# Patient Record
Sex: MALE | Race: BLACK OR AFRICAN AMERICAN | Employment: UNEMPLOYED
[De-identification: names, ages, dates, MRNs, and addresses within clinical notes are randomized per-mention and may not be internally consistent; named-entity substitution may affect disease eponyms.]

---

## 2017-03-24 PROBLEM — R53.82 CHRONIC FATIGUE: Status: ACTIVE | Noted: 2017-03-24

## 2017-03-24 PROBLEM — R06.09 DYSPNEA ON EXERTION: Status: ACTIVE | Noted: 2017-03-24

## 2017-10-17 ENCOUNTER — TELEPHONE (OUTPATIENT)
Dept: CASE MANAGEMENT | Age: 57
End: 2017-10-17

## 2017-10-25 ENCOUNTER — TELEPHONE (OUTPATIENT)
Dept: CASE MANAGEMENT | Age: 57
End: 2017-10-25

## 2017-10-25 NOTE — LETTER
Medical Imaging Services         10/25/2017      Melchor Alegria  [email or web address]      Dear Melchor Alegria,        Our records indicate you missed your scheduled CT Lung Screening on  10/24/17. We were unsuccessful in contacting you by phone, however we value you as a patient and want to ensure that you are getting the appropriate care. Please call our patient navigator [phone]) or our scheduling department ([phone]) for assistance in rescheduling this important screening exam.           Sincerely,    [facility]  [address]:  [phone]  F:  [phone]          Uli Cabral MD

## 2017-10-25 NOTE — TELEPHONE ENCOUNTER
No show for CT Lung Screening scheduled 10/24/17. Left message for patient to call to reschedule.       Araceli Olvera, R.T.(R)(T), Radiology Patient Navigator

## 2018-03-12 ENCOUNTER — TELEPHONE (OUTPATIENT)
Dept: PULMONOLOGY | Age: 58
End: 2018-03-12

## 2018-03-12 DIAGNOSIS — R06.09 DYSPNEA ON EXERTION: ICD-10-CM

## 2018-03-12 DIAGNOSIS — J44.9 CHRONIC OBSTRUCTIVE PULMONARY DISEASE, UNSPECIFIED COPD TYPE (HCC): ICD-10-CM

## 2018-03-12 RX ORDER — BUDESONIDE AND FORMOTEROL FUMARATE DIHYDRATE 160; 4.5 UG/1; UG/1
2 AEROSOL RESPIRATORY (INHALATION) 2 TIMES DAILY
Qty: 3 INHALER | Refills: 3 | Status: SHIPPED | OUTPATIENT
Start: 2018-03-12 | End: 2018-10-30 | Stop reason: SDUPTHER

## 2018-03-22 RX ORDER — VARENICLINE TARTRATE 1 MG/1
1 TABLET, FILM COATED ORAL 2 TIMES DAILY
Qty: 30 TABLET | Refills: 1 | Status: SHIPPED | OUTPATIENT
Start: 2018-03-22 | End: 2018-04-10

## 2018-04-10 ENCOUNTER — HOSPITAL ENCOUNTER (OUTPATIENT)
Age: 58
Discharge: HOME | End: 2018-04-12
Payer: MEDICAID

## 2018-04-10 ENCOUNTER — OFFICE VISIT (OUTPATIENT)
Dept: FAMILY MEDICINE CLINIC | Age: 58
End: 2018-04-10
Payer: MEDICAID

## 2018-04-10 VITALS
HEIGHT: 73 IN | WEIGHT: 221 LBS | OXYGEN SATURATION: 94 % | HEART RATE: 97 BPM | BODY MASS INDEX: 29.29 KG/M2 | RESPIRATION RATE: 20 BRPM | DIASTOLIC BLOOD PRESSURE: 70 MMHG | SYSTOLIC BLOOD PRESSURE: 138 MMHG

## 2018-04-10 DIAGNOSIS — I10 ESSENTIAL HYPERTENSION: ICD-10-CM

## 2018-04-10 DIAGNOSIS — J44.9 CHRONIC OBSTRUCTIVE PULMONARY DISEASE, UNSPECIFIED COPD TYPE (HCC): Primary | ICD-10-CM

## 2018-04-10 DIAGNOSIS — M79.10 MYALGIA: ICD-10-CM

## 2018-04-10 DIAGNOSIS — R53.82 CHRONIC FATIGUE: ICD-10-CM

## 2018-04-10 PROBLEM — R06.09 DYSPNEA ON EXERTION: Status: RESOLVED | Noted: 2017-03-24 | Resolved: 2018-04-10

## 2018-04-10 LAB
ALBUMIN SERPL-MCNC: 4.2 G/DL (ref 3.5–5.2)
ALP BLD-CCNC: 59 U/L (ref 40–129)
ALT SERPL-CCNC: 13 U/L (ref 0–40)
ANION GAP SERPL CALCULATED.3IONS-SCNC: 12 MMOL/L (ref 7–16)
AST SERPL-CCNC: 13 U/L (ref 0–39)
BASOPHILS ABSOLUTE: 0.01 E9/L (ref 0–0.2)
BASOPHILS RELATIVE PERCENT: 0.1 % (ref 0–2)
BILIRUB SERPL-MCNC: 0.2 MG/DL (ref 0–1.2)
BUN BLDV-MCNC: 13 MG/DL (ref 6–20)
CALCIUM SERPL-MCNC: 9 MG/DL (ref 8.6–10.2)
CHLORIDE BLD-SCNC: 91 MMOL/L (ref 98–107)
CO2: 41 MMOL/L (ref 22–29)
CREAT SERPL-MCNC: 0.8 MG/DL (ref 0.7–1.2)
EOSINOPHILS ABSOLUTE: 0.03 E9/L (ref 0.05–0.5)
EOSINOPHILS RELATIVE PERCENT: 0.3 % (ref 0–6)
FOLATE: 18.2 NG/ML (ref 4.8–24.2)
GFR AFRICAN AMERICAN: >60
GFR NON-AFRICAN AMERICAN: >60 ML/MIN/1.73
GLUCOSE BLD-MCNC: 110 MG/DL (ref 74–109)
HCT VFR BLD CALC: 41.8 % (ref 37–54)
HEMOGLOBIN: 12.4 G/DL (ref 12.5–16.5)
IMMATURE GRANULOCYTES #: 0.04 E9/L
IMMATURE GRANULOCYTES %: 0.4 % (ref 0–5)
LYMPHOCYTES ABSOLUTE: 1.21 E9/L (ref 1.5–4)
LYMPHOCYTES RELATIVE PERCENT: 11.4 % (ref 20–42)
MAGNESIUM: 2.2 MG/DL (ref 1.6–2.6)
MCH RBC QN AUTO: 30.7 PG (ref 26–35)
MCHC RBC AUTO-ENTMCNC: 29.7 % (ref 32–34.5)
MCV RBC AUTO: 103.5 FL (ref 80–99.9)
MONOCYTES ABSOLUTE: 0.81 E9/L (ref 0.1–0.95)
MONOCYTES RELATIVE PERCENT: 7.6 % (ref 2–12)
NEUTROPHILS ABSOLUTE: 8.49 E9/L (ref 1.8–7.3)
NEUTROPHILS RELATIVE PERCENT: 80.2 % (ref 43–80)
PDW BLD-RTO: 11.9 FL (ref 11.5–15)
PLATELET # BLD: 343 E9/L (ref 130–450)
PMV BLD AUTO: 10.5 FL (ref 7–12)
POTASSIUM SERPL-SCNC: 4.3 MMOL/L (ref 3.5–5)
RBC # BLD: 4.04 E12/L (ref 3.8–5.8)
RHEUMATOID FACTOR: <10 IU/ML (ref 0–13)
SODIUM BLD-SCNC: 144 MMOL/L (ref 132–146)
TOTAL CK: 174 U/L (ref 20–200)
TOTAL PROTEIN: 6.8 G/DL (ref 6.4–8.3)
TSH SERPL DL<=0.05 MIU/L-ACNC: 0.46 UIU/ML (ref 0.27–4.2)
VITAMIN B-12: 278 PG/ML (ref 211–946)
WBC # BLD: 10.6 E9/L (ref 4.5–11.5)

## 2018-04-10 PROCEDURE — 86038 ANTINUCLEAR ANTIBODIES: CPT

## 2018-04-10 PROCEDURE — 83735 ASSAY OF MAGNESIUM: CPT

## 2018-04-10 PROCEDURE — 86431 RHEUMATOID FACTOR QUANT: CPT

## 2018-04-10 PROCEDURE — 85025 COMPLETE CBC W/AUTO DIFF WBC: CPT

## 2018-04-10 PROCEDURE — 85651 RBC SED RATE NONAUTOMATED: CPT

## 2018-04-10 PROCEDURE — 36415 COLL VENOUS BLD VENIPUNCTURE: CPT | Performed by: FAMILY MEDICINE

## 2018-04-10 PROCEDURE — 84443 ASSAY THYROID STIM HORMONE: CPT

## 2018-04-10 PROCEDURE — 99214 OFFICE O/P EST MOD 30 MIN: CPT | Performed by: FAMILY MEDICINE

## 2018-04-10 PROCEDURE — 80053 COMPREHEN METABOLIC PANEL: CPT

## 2018-04-10 PROCEDURE — 82746 ASSAY OF FOLIC ACID SERUM: CPT

## 2018-04-10 PROCEDURE — 82607 VITAMIN B-12: CPT

## 2018-04-10 PROCEDURE — 82550 ASSAY OF CK (CPK): CPT

## 2018-04-10 ASSESSMENT — ENCOUNTER SYMPTOMS
NAUSEA: 0
VOMITING: 0
EYE ITCHING: 0
TROUBLE SWALLOWING: 0
DIARRHEA: 0
COLOR CHANGE: 0
ABDOMINAL PAIN: 0
CHEST TIGHTNESS: 0
COUGH: 1
BACK PAIN: 0
EYE REDNESS: 0
SHORTNESS OF BREATH: 1
SINUS PRESSURE: 0
CONSTIPATION: 0
SORE THROAT: 0
EYE PAIN: 0
WHEEZING: 1
RHINORRHEA: 0
BLOOD IN STOOL: 0
APNEA: 0

## 2018-04-10 ASSESSMENT — COPD QUESTIONNAIRES: COPD: 1

## 2018-04-10 ASSESSMENT — PATIENT HEALTH QUESTIONNAIRE - PHQ9
1. LITTLE INTEREST OR PLEASURE IN DOING THINGS: 0
SUM OF ALL RESPONSES TO PHQ9 QUESTIONS 1 & 2: 1
SUM OF ALL RESPONSES TO PHQ QUESTIONS 1-9: 1
2. FEELING DOWN, DEPRESSED OR HOPELESS: 1

## 2018-04-11 LAB
ANTI-NUCLEAR ANTIBODY (ANA): NEGATIVE
SEDIMENTATION RATE, ERYTHROCYTE: 5 MM/HR (ref 0–15)

## 2018-05-03 ENCOUNTER — TELEPHONE (OUTPATIENT)
Dept: PULMONOLOGY | Age: 58
End: 2018-05-03

## 2018-05-03 ENCOUNTER — OFFICE VISIT (OUTPATIENT)
Dept: PULMONOLOGY | Age: 58
End: 2018-05-03
Payer: MEDICAID

## 2018-05-03 VITALS
SYSTOLIC BLOOD PRESSURE: 113 MMHG | OXYGEN SATURATION: 95 % | HEART RATE: 105 BPM | RESPIRATION RATE: 26 BRPM | BODY MASS INDEX: 29.53 KG/M2 | DIASTOLIC BLOOD PRESSURE: 62 MMHG | WEIGHT: 218 LBS | HEIGHT: 72 IN

## 2018-05-03 DIAGNOSIS — J44.9 CHRONIC OBSTRUCTIVE PULMONARY DISEASE, UNSPECIFIED COPD TYPE (HCC): ICD-10-CM

## 2018-05-03 PROCEDURE — 99213 OFFICE O/P EST LOW 20 MIN: CPT | Performed by: INTERNAL MEDICINE

## 2018-05-23 RX ORDER — VARENICLINE TARTRATE 1 MG/1
1 TABLET, FILM COATED ORAL 2 TIMES DAILY
Qty: 60 TABLET | Refills: 3 | Status: SHIPPED | OUTPATIENT
Start: 2018-05-23 | End: 2018-09-27 | Stop reason: SDUPTHER

## 2018-05-23 RX ORDER — ALBUTEROL SULFATE 90 UG/1
2 AEROSOL, METERED RESPIRATORY (INHALATION) EVERY 4 HOURS PRN
Qty: 1 INHALER | Refills: 5 | Status: SHIPPED | OUTPATIENT
Start: 2018-05-23 | End: 2018-10-30 | Stop reason: SDUPTHER

## 2018-05-23 RX ORDER — LISINOPRIL AND HYDROCHLOROTHIAZIDE 25; 20 MG/1; MG/1
1 TABLET ORAL DAILY
Qty: 30 TABLET | Refills: 5 | Status: SHIPPED | OUTPATIENT
Start: 2018-05-23 | End: 2019-01-09 | Stop reason: SDUPTHER

## 2018-07-05 ENCOUNTER — TELEPHONE (OUTPATIENT)
Dept: PULMONOLOGY | Age: 58
End: 2018-07-05

## 2018-07-05 NOTE — TELEPHONE ENCOUNTER
Returned patient's phone call about some questions he had  He stated that on 6-29-18, he was in his car and his portable oxygen concentrator overheated and he felt like he was suffocating, had a panic attack. 911 was called and EMT. Fire and Police showed up. He called B[facility]ELLE and that he was told he would have to bring in his POC to have it serviced and he did not want to drive there for that  I called [facility] and spoke to [facility] and she stated that if the patient wanted to get his POC serviced he would have to come in. They would not go pick it up, repair and drop it back off. She also stated that they do not sell the POC's anymore but said the patient could get new oxygen tanks that he could fill up himself and that would replace the POC and they could do that tomorrow  I called patient back and relayed the message from [facility] and he stated that he would be interested in doing that  I called [facility] back and she set everything up and that they would deliver everything tomorrow and take his POC as well. They could not guarantee a time but would call patient in the morning to let him know what stop he was  I called the patient back once more and relayed this message and told him to answer the phone tomorrow.

## 2018-09-28 RX ORDER — VARENICLINE TARTRATE 1 MG/1
TABLET, FILM COATED ORAL
Qty: 60 TABLET | Refills: 0 | Status: SHIPPED | OUTPATIENT
Start: 2018-09-28 | End: 2019-07-15 | Stop reason: SDUPTHER

## 2018-10-30 ENCOUNTER — TELEPHONE (OUTPATIENT)
Dept: PULMONOLOGY | Age: 58
End: 2018-10-30

## 2018-10-30 DIAGNOSIS — R06.09 DYSPNEA ON EXERTION: ICD-10-CM

## 2018-10-30 DIAGNOSIS — J44.9 CHRONIC OBSTRUCTIVE PULMONARY DISEASE, UNSPECIFIED COPD TYPE (HCC): Primary | ICD-10-CM

## 2018-10-30 RX ORDER — BUDESONIDE AND FORMOTEROL FUMARATE DIHYDRATE 160; 4.5 UG/1; UG/1
2 AEROSOL RESPIRATORY (INHALATION) 2 TIMES DAILY
Qty: 3 INHALER | Refills: 5 | Status: SHIPPED | OUTPATIENT
Start: 2018-10-30 | End: 2018-10-30

## 2018-10-30 RX ORDER — BUDESONIDE AND FORMOTEROL FUMARATE DIHYDRATE 160; 4.5 UG/1; UG/1
2 AEROSOL RESPIRATORY (INHALATION) 2 TIMES DAILY
Qty: 1 INHALER | Refills: 3 | Status: SHIPPED | OUTPATIENT
Start: 2018-10-30 | End: 2019-01-11 | Stop reason: SDUPTHER

## 2018-10-30 RX ORDER — ALBUTEROL SULFATE 90 UG/1
2 AEROSOL, METERED RESPIRATORY (INHALATION) EVERY 4 HOURS PRN
Qty: 1 INHALER | Refills: 5 | Status: SHIPPED | OUTPATIENT
Start: 2018-10-30 | End: 2019-05-06 | Stop reason: SDUPTHER

## 2018-10-30 NOTE — TELEPHONE ENCOUNTER
Refills needed for inhalers; advised pt new scripts will be sent to pharmacy for pt to . Sample of Symbicort available at office here per Dr. Sergo Jacobo's orders for pt to .

## 2019-01-09 RX ORDER — LISINOPRIL AND HYDROCHLOROTHIAZIDE 25; 20 MG/1; MG/1
1 TABLET ORAL DAILY
Qty: 30 TABLET | Refills: 0 | Status: SHIPPED | OUTPATIENT
Start: 2019-01-09 | End: 2019-02-11 | Stop reason: SDUPTHER

## 2019-01-11 ENCOUNTER — OFFICE VISIT (OUTPATIENT)
Dept: PULMONOLOGY | Age: 59
End: 2019-01-11
Payer: MEDICAID

## 2019-01-11 VITALS
HEIGHT: 73 IN | TEMPERATURE: 99.2 F | HEART RATE: 87 BPM | DIASTOLIC BLOOD PRESSURE: 63 MMHG | SYSTOLIC BLOOD PRESSURE: 122 MMHG | RESPIRATION RATE: 12 BRPM | OXYGEN SATURATION: 95 % | WEIGHT: 222 LBS | BODY MASS INDEX: 29.42 KG/M2

## 2019-01-11 DIAGNOSIS — J44.9 CHRONIC OBSTRUCTIVE PULMONARY DISEASE, UNSPECIFIED COPD TYPE (HCC): Primary | ICD-10-CM

## 2019-01-11 PROCEDURE — 99213 OFFICE O/P EST LOW 20 MIN: CPT | Performed by: INTERNAL MEDICINE

## 2019-01-11 PROCEDURE — 99214 OFFICE O/P EST MOD 30 MIN: CPT | Performed by: INTERNAL MEDICINE

## 2019-01-11 RX ORDER — BUDESONIDE AND FORMOTEROL FUMARATE DIHYDRATE 160; 4.5 UG/1; UG/1
2 AEROSOL RESPIRATORY (INHALATION) 2 TIMES DAILY
Qty: 1 INHALER | Refills: 5 | Status: SHIPPED | OUTPATIENT
Start: 2019-01-11 | End: 2020-01-24 | Stop reason: SDUPTHER

## 2019-02-11 RX ORDER — LISINOPRIL AND HYDROCHLOROTHIAZIDE 25; 20 MG/1; MG/1
1 TABLET ORAL DAILY
Qty: 30 TABLET | Refills: 5 | Status: SHIPPED | OUTPATIENT
Start: 2019-02-11 | End: 2019-12-04 | Stop reason: SDUPTHER

## 2019-04-05 ENCOUNTER — TELEPHONE (OUTPATIENT)
Dept: PULMONOLOGY | Age: 59
End: 2019-04-05

## 2019-04-08 ENCOUNTER — TELEPHONE (OUTPATIENT)
Dept: PULMONOLOGY | Age: 59
End: 2019-04-08

## 2019-04-08 NOTE — TELEPHONE ENCOUNTER
ELIDA called patient per RN from pulmonary due to patient reporting stress interfering with medical compliance at times. 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 He also has a 15and 5year old son. Patient reports that he has limited support from family/friends and has been trying for a year to obtain assistance for his daughter but struggles to keep appointments due to his health issues. Patient reports he has been in contact with the [employer] as well as [employer] and [employer]. He reports never completing the waiver application as he also has to go  her official birth certificate from the [employer]. He reports it is very difficult to get her out of the car and has yet to find someone who is able to watch her. He does report he just recently began working with the  through [facility] who is facilitating community linkages. Otherwise, he seems to be aware of services within the community but has difficulty keeping appointments due to his own health issues. SW encouraged making a list of the steps of most important and making a goal to complete one each week without setting a specific day since it is based on how he feels. He did report having a friend over today to help him. Discussed the importance of getting main things done first in order to even consider tackling the bigger projects. He agreed. Patient also questioned about samples for symbicort as he had been using them too often so is out until his next refill. ELIDA spoke with RN, Shy Barth who states that she told patient that we are currently out of samples but will notify him when some come in for him to come . Relayed this to patient.  ELIDA offered empathetic support, encouraged problem solving/goal setting, and offered resource options but he was already aware/linked with. Encouraged him to reach out to SW with further questions/concerns. Will check in with him at next appointment. Will continue to follow as needed.

## 2019-05-06 ENCOUNTER — TELEPHONE (OUTPATIENT)
Dept: PULMONOLOGY | Age: 59
End: 2019-05-06

## 2019-05-06 DIAGNOSIS — J44.9 CHRONIC OBSTRUCTIVE PULMONARY DISEASE, UNSPECIFIED COPD TYPE (HCC): ICD-10-CM

## 2019-05-06 RX ORDER — ALBUTEROL SULFATE 90 UG/1
2 AEROSOL, METERED RESPIRATORY (INHALATION) EVERY 4 HOURS PRN
Qty: 1 INHALER | Refills: 5 | Status: SHIPPED | OUTPATIENT
Start: 2019-05-06 | End: 2019-09-03 | Stop reason: SDUPTHER

## 2019-05-07 ENCOUNTER — TELEPHONE (OUTPATIENT)
Dept: PULMONOLOGY | Age: 59
End: 2019-05-07

## 2019-05-07 NOTE — TELEPHONE ENCOUNTER
Call from pt requesting RN switch his oxygen out to [address] [place] [address] so he can get the portable Oxygen Concentrator he wants back. Advised pt that I will need to clarify O2 orders to ensure he can get adequate oxygenation with the O2@   4 liters. RN reviewed records and O2 sat with 4 Liters O2 on @95%. Per Colonel Jeronimo @ [facility] they\"no longer provide Portable O2 Concentrators for pt's at home; they only have portable cylinders\". Spoke with Tessa Ramírez and she will check with Respiratory dept there to see if they can provide portable concentrator for pt. Rn advised pt we are working on his request and will update him as soon as we have info to share.

## 2019-05-08 ENCOUNTER — TELEPHONE (OUTPATIENT)
Dept: PULMONOLOGY | Age: 59
End: 2019-05-08

## 2019-05-08 NOTE — TELEPHONE ENCOUNTER
Called [facility] and spoke to [place] [facility]. Mimi Zamora stated that she faxed everything over yesterday. I explained that [facility] did not receive it and asked if she could refax it again.  She stated that she would

## 2019-05-10 ENCOUNTER — TELEPHONE (OUTPATIENT)
Dept: PULMONOLOGY | Age: 59
End: 2019-05-10

## 2019-05-10 NOTE — TELEPHONE ENCOUNTER
Call to pt to update on attempts to get Oxygen switched to [facility] from [address] [place] [address]. Some info is not available for pt in medical record and [employer] requires new oxygen testing and orders. Info that [facility] received from [facility] is not able to be used. Advised pt to continue the portable O2 he has in home from [facility] until we are able to switch out after appt in July. Pt will require new oxygen testing and needs to be evaluated with Oxygen in office. Pt agrees to this plan. RN will continue to try and obtain oxygen from [facility].

## 2019-05-23 ENCOUNTER — TELEPHONE (OUTPATIENT)
Dept: PULMONOLOGY | Age: 59
End: 2019-05-23

## 2019-05-23 NOTE — TELEPHONE ENCOUNTER
Mailed a letter to patient informing him that his CT Chest is scheduled for 6-20-19 at 10:30 am at the [facility]. He must arrive by 10:00 am. There is no prep for this test

## 2019-07-15 ENCOUNTER — TELEPHONE (OUTPATIENT)
Dept: PULMONOLOGY | Age: 59
End: 2019-07-15

## 2019-07-15 DIAGNOSIS — J44.9 CHRONIC OBSTRUCTIVE PULMONARY DISEASE, UNSPECIFIED COPD TYPE (HCC): Primary | ICD-10-CM

## 2019-07-15 DIAGNOSIS — Z72.0 TOBACCO ABUSE: ICD-10-CM

## 2019-07-15 RX ORDER — VARENICLINE TARTRATE 1 MG/1
TABLET, FILM COATED ORAL
Qty: 60 TABLET | Refills: 0 | Status: SHIPPED | OUTPATIENT
Start: 2019-07-15 | End: 2019-08-07 | Stop reason: SDUPTHER

## 2019-08-07 DIAGNOSIS — J44.9 CHRONIC OBSTRUCTIVE PULMONARY DISEASE, UNSPECIFIED COPD TYPE (HCC): ICD-10-CM

## 2019-08-07 DIAGNOSIS — Z72.0 TOBACCO ABUSE: ICD-10-CM

## 2019-08-07 RX ORDER — VARENICLINE TARTRATE 1 MG/1
TABLET, FILM COATED ORAL
Qty: 60 TABLET | Refills: 1 | Status: SHIPPED
Start: 2019-08-07 | End: 2020-04-07

## 2019-09-03 ENCOUNTER — TELEPHONE (OUTPATIENT)
Dept: PULMONOLOGY | Age: 59
End: 2019-09-03

## 2019-09-03 DIAGNOSIS — J44.9 CHRONIC OBSTRUCTIVE PULMONARY DISEASE, UNSPECIFIED COPD TYPE (HCC): ICD-10-CM

## 2019-09-03 DIAGNOSIS — G47.33 OSA (OBSTRUCTIVE SLEEP APNEA): Primary | ICD-10-CM

## 2019-10-01 DIAGNOSIS — J44.9 CHRONIC OBSTRUCTIVE PULMONARY DISEASE, UNSPECIFIED COPD TYPE (HCC): ICD-10-CM

## 2019-10-24 DIAGNOSIS — J44.9 CHRONIC OBSTRUCTIVE PULMONARY DISEASE, UNSPECIFIED COPD TYPE (HCC): ICD-10-CM

## 2019-10-25 ENCOUNTER — TELEPHONE (OUTPATIENT)
Dept: PULMONOLOGY | Age: 59
End: 2019-10-25

## 2019-10-25 DIAGNOSIS — J44.9 CHRONIC OBSTRUCTIVE PULMONARY DISEASE, UNSPECIFIED COPD TYPE (HCC): ICD-10-CM

## 2019-10-25 RX ORDER — ALBUTEROL SULFATE 90 UG/1
AEROSOL, METERED RESPIRATORY (INHALATION)
Qty: 18 G | Refills: 5 | Status: SHIPPED | OUTPATIENT
Start: 2019-10-25 | End: 2020-01-24

## 2019-11-15 DIAGNOSIS — J44.9 CHRONIC OBSTRUCTIVE PULMONARY DISEASE, UNSPECIFIED COPD TYPE (HCC): ICD-10-CM

## 2019-11-15 RX ORDER — ALBUTEROL SULFATE 90 UG/1
AEROSOL, METERED RESPIRATORY (INHALATION)
Qty: 18 G | Refills: 6 | Status: SHIPPED | OUTPATIENT
Start: 2019-11-15 | End: 2020-10-02 | Stop reason: SDUPTHER

## 2019-11-22 ENCOUNTER — TELEPHONE (OUTPATIENT)
Dept: PULMONOLOGY | Age: 59
End: 2019-11-22

## 2019-12-04 RX ORDER — LISINOPRIL AND HYDROCHLOROTHIAZIDE 25; 20 MG/1; MG/1
1 TABLET ORAL DAILY
Qty: 30 TABLET | Refills: 5 | Status: SHIPPED | OUTPATIENT
Start: 2019-12-04 | End: 2020-07-06

## 2019-12-16 DIAGNOSIS — J44.9 CHRONIC OBSTRUCTIVE PULMONARY DISEASE, UNSPECIFIED COPD TYPE (HCC): Primary | ICD-10-CM

## 2019-12-16 RX ORDER — TIOTROPIUM BROMIDE 18 UG/1
CAPSULE ORAL; RESPIRATORY (INHALATION)
Refills: 3 | COMMUNITY
Start: 2019-09-23 | End: 2019-12-16 | Stop reason: SDUPTHER

## 2020-01-08 ENCOUNTER — TELEPHONE (OUTPATIENT)
Dept: PULMONOLOGY | Age: 60
End: 2020-01-08

## 2020-01-08 NOTE — TELEPHONE ENCOUNTER
Call from pt and left  requesting RN call pt. Returned call and pt reports having some feet/leg  \"swelling\" at times. Pt states he takes his lisinopril daily per orders but recently he's had some swelling in his feet and legs and was wondering what he should do. Pt unable to get to PCP appt if he made one to get check up. States he has car issues and no one to take him. Pt working with [employer]. Pt without nursing aid assistance currently so he is unable to get himself checked out. Pt advised to elevate feet/legs and avoid salty foots that may be causing the feet swelling. Pt unsure if he has any issues other than high blood pressure that would be causing the swelling. Offered LSW Assistance to help with seeking out resources that may help him maintain his health. Pt receptive to the SW discussing this with him. He feels he has his Dtr who is autistic in school now and getting the care she needs however he does state he has some issues with the care of all 3 kids in the home as the only caregiver. Pt to follow up with Dr. Jace Tariq in office on 1/27/20. Appt card to be mailed as pt request to remind him of date/time and so he can arrange a ride. Pt to call if any needs with keeping appointment. Collette Haas I will request Cj Rutherford reach out by phone or meet him at office visit to discuss issues with him. Khanh Ybarra is receptive to this plan.

## 2020-01-13 ENCOUNTER — TELEPHONE (OUTPATIENT)
Dept: PULMONOLOGY | Age: 60
End: 2020-01-13

## 2020-01-14 ENCOUNTER — TELEPHONE (OUTPATIENT)
Dept: PULMONOLOGY | Age: 60
End: 2020-01-14

## 2020-01-24 RX ORDER — BUDESONIDE AND FORMOTEROL FUMARATE DIHYDRATE 160; 4.5 UG/1; UG/1
2 AEROSOL RESPIRATORY (INHALATION) 2 TIMES DAILY
Qty: 1 INHALER | Refills: 5 | Status: SHIPPED
Start: 2020-01-24 | End: 2020-05-04 | Stop reason: SDUPTHER

## 2020-01-24 NOTE — PROGRESS NOTES
Refill script needed for pharmacy to refill Symbicort; albuterol script reviewed and pt has active script with refills. Pharmacy Tech at [facility] to get scripts ready for .  Pt advised to check with pharmacy later this am.

## 2020-01-27 ENCOUNTER — OFFICE VISIT (OUTPATIENT)
Dept: PULMONOLOGY | Age: 60
End: 2020-01-27
Payer: MEDICAID

## 2020-01-27 PROCEDURE — 99214 OFFICE O/P EST MOD 30 MIN: CPT | Performed by: INTERNAL MEDICINE

## 2020-01-27 PROCEDURE — 99213 OFFICE O/P EST LOW 20 MIN: CPT | Performed by: INTERNAL MEDICINE

## 2020-01-27 NOTE — PROGRESS NOTES
at the most    The patient denies any coughing weight loss ,hemoptysis ,dyspnea on exertion,pleuritic chest pain, fever or chills or orthopnea. He has a dog living with him at home    He works as  for 15 years    As mentioned he started smoking at age 14 with him about 40 pack -year smoking history      He has been on  oxygen at home 3-5 L in the daytime, use  at night as well    Today visit    Since stated that he is breathing very well he stated that [place] is not helping him and he went back to Symbicort, and he also using Spiriva as well.     He also continue to use his home oxygen    He did not do the CAT scan as of the chest as he was worried about if he finds something he will not be able to afford the workup on further investigation      He denies any fever or chest pain hemoptysis his breathing at baseline no cough no wheezing no swelling in legs      He was able to quit smoking with the help of Chantix,He  is no longer a smoker    Today visit   Still with SOB and MANNING   He denies any fever or chills and no chest pain   States that he stopped Smoking but some marijuana   Doing fair with  Symbicort, Spiriva   No chest pain   On O2       ALLERGIES:    Allergies   Allergen Reactions    Iv Dye [Iodides] Hives       PAST MEDICAL HISTORY:       Diagnosis Date    COPD [facility]chronic obstructive pulmonary disease) ([facility] [address]     Hypertension        MEDICATIONS:   Current Outpatient Medications   Medication Sig Dispense Refill    budesonide-formoterol (SYMBICORT) 160-4.5 MCG/ACT AERO Inhale 2 puffs into the lungs 2 times daily 1 Inhaler 5    tiotropium (SPIRIVA HANDIHALER) 18 MCG inhalation capsule Inhale 1 capsule into the lungs daily 30 capsule 5    lisinopril-hydrochlorothiazide (PRINZIDE;ZESTORETIC) 20-25 MG per tablet Take 1 tablet by mouth daily 30 tablet 5    albuterol sulfate HFA (VENTOLIN HFA) 108 (90 Base) MCG/ACT inhaler INHALE 2 PUFFS BY MOUTH INTO THE LUNGS EVERY 4 HOURS AS NEEDED FOR WHEEZING 18 g 6    varenicline (CHANTIX) 1 MG tablet TAKE 1 TABLET BY MOUTH TWICE DAILY 60 tablet 1    ipratropium-albuterol (DUONEB) 0.5-2.5 (3) MG/3ML SOLN nebulizer solution        No current facility-administered medications for this visit. SOCIAL AND OCCUPATIONAL HEALTH:  Social History     Tobacco Use   Smoking Status Current Some Day Smoker    Packs/day: 0.50    Years: 40.00    Pack years: 20.00    Last attempt to quit: 05/2016    Years since quitting: 3.7   Smokeless Tobacco Never Used     TB :no  Pets dog  Industrial exposure:minimal  Birds :none    SURGICAL HISTORY:   No past surgical history on file. FAMILY HISTORY:   Lung cancer:none  DVT or PE None     REVIEW OF SYSTEMS:  Constitutional: General health is good . There has been no weight changes. Mild fatigue no   Head: Patient denies any history of trauma, convulsive disorder or syncope. Skin:  Patient denies history of changes in pigmentation, eruptions or pruritus. No easy bruising or bleeding. EENT: no nasal congestion   Cardiovascular ,No chest pain ,No edema ,  Respiration:SOB yes  :  ,MANNING :yes ++  Gastrointestinal:No GI bleed ,no melena  ,no hematemesis    Musculoskeletal: no joint pain ,no swelling  Neurological:no , syncope. Denies twitching, convulsions, loss of consciousness or memory. Endocrine:  . No history of goiter, exophthalmos or dryness of skin. The patient has no history of diabetes. Hematopoietic:  No history of bleeding disorders or easy bruising. Rheumatic:  No connective tissue disease history or polyarthritis/inflammatory joint disease. PHYSICAL EXAMINATION:  There were no vitals filed for this visit. Constitutional: This is a well developed, well nourished 60y.o. year old male who is alert, oriented, cooperative and in no apparent distress. Head was normocephalic and atraumatic. EENT: Mallampati class :  Extraocular muscles intact. External canals are patent and no discharge was appreciated. Septum was midline,   mucosa was without erythema, exudates or cobblestoning. No thrush was noted. Neck: Supple without thyromegaly. No elevated JVP. Trachea was midline. No carotid bruits were auscultated. Respiratory: Rhonchi bilateral    Cardiovascular: Regular without murmur, clicks, gallops or rubs. There is no left or right ventricular heave. Pulses:  Carotid, radial and femoral pulses were equally bilaterally. Abdomen: Slightly rounded and soft without organomegaly. No rebound, rigidity or guarding was appreciated. Lymphatic: No lymphadenopathy. Musculoskeletal: No joint deformity  Extremities: Mild swelling bilaterally +1 edema  Skin:  Warm and dry. Good color, turgor and pigmentation. No lesions or scars. Neurological/Psychiatric: The patient's general behavior, level of consciousness, thought content and emotional status is normal.  Cranial nerves II-XII are intact. DATA:     FVC 1.37 which is 50% of predicted   FEV1 0.62 which is 17% of predicted    FEV1/FVC 45 which is 57 of predicted with lower limit of normal 68     MVV 22 which is 14% of predicted   impression very severe obstruction  DLCO 38 as.   Last PFT which is booked him a severe reduction in DLCO    1 antitrypsin 167 with phenotype MM    IMPRESSION:    1-very severe COPD  2-obstructive sleep apnea  3-obesity  4--chronic hypoxic respiratory failure  5-Nicotine dependence    PLAN:      -Patient is very pleasant 59-year-old male with history of severe COPD with no response to bronchodilator with severe reduction in DLCO and history of nicotine dependence  Quit smoking   Will change to Trelegy   Still on Chanitix ,advise that he can not be on it for long time   Use albuterol 1-2 a day   Referral    Pulmonary Rehab now   He is trying to use his CPAP ,but not as he sued to get used to it ,will get ABG ,may need to be changed to BiPAP   Alph 1 antitrypsin normal   IgE  38  Will order CT chest r/o ,mass ,need to go     He

## 2020-02-04 ENCOUNTER — TELEPHONE (OUTPATIENT)
Dept: PULMONOLOGY | Age: 60
End: 2020-02-04

## 2020-02-04 NOTE — TELEPHONE ENCOUNTER
Call from [facility] stating pt cannot tolerate the new Trelegy inhaler. Pt reports it \"burned my chest and made it harder to breath\". Pt requesting to resume the Symbicort inhaler he has been using. Advised pt that there is an active script at the pharmacy on file for him. Instructed to check on med status and call back if needing new script to be sent. Sample of Symbicort 160mcg being mailed out to pt per request; per Dr. Coleman Jacobo's orders.

## 2020-02-12 ENCOUNTER — APPOINTMENT (OUTPATIENT)
Dept: GENERAL RADIOLOGY | Age: 60
DRG: 140 | End: 2020-02-12
Payer: MEDICAID

## 2020-02-12 ENCOUNTER — HOSPITAL ENCOUNTER (INPATIENT)
Age: 60
LOS: 1 days | Discharge: HOME | DRG: 140 | End: 2020-02-13
Attending: EMERGENCY MEDICINE | Admitting: INTERNAL MEDICINE
Payer: MEDICAID

## 2020-02-12 ENCOUNTER — APPOINTMENT (OUTPATIENT)
Dept: ULTRASOUND IMAGING | Age: 60
DRG: 140 | End: 2020-02-12
Payer: MEDICAID

## 2020-02-12 PROBLEM — J96.01 ACUTE RESPIRATORY FAILURE WITH HYPOXIA AND HYPERCAPNIA (HCC): Status: ACTIVE | Noted: 2020-02-12

## 2020-02-12 PROBLEM — J96.02 ACUTE RESPIRATORY FAILURE WITH HYPOXIA AND HYPERCAPNIA (HCC): Status: ACTIVE | Noted: 2020-02-12

## 2020-02-12 LAB
AADO2: 76.5 MMHG
AADO2: 83.6 MMHG
ADENOVIRUS BY PCR: NOT DETECTED
ANION GAP SERPL CALCULATED.3IONS-SCNC: 10 MMOL/L (ref 7–16)
B.E.: 13.1 MMOL/L (ref -3–3)
B.E.: 16.4 MMOL/L (ref -3–3)
B.E.: 19.6 MMOL/L (ref -3–3)
BASOPHILS ABSOLUTE: 0.03 E9/L (ref 0–0.2)
BASOPHILS RELATIVE PERCENT: 0.3 % (ref 0–2)
BORDETELLA PARAPERTUSSIS BY PCR: NOT DETECTED
BORDETELLA PERTUSSIS BY PCR: NOT DETECTED
BUN BLDV-MCNC: 12 MG/DL (ref 8–23)
CALCIUM SERPL-MCNC: 9.1 MG/DL (ref 8.6–10.2)
CHLAMYDOPHILIA PNEUMONIAE BY PCR: NOT DETECTED
CHLORIDE BLD-SCNC: 90 MMOL/L (ref 98–107)
CO2: 41 MMOL/L (ref 22–29)
COHB: 2.5 % (ref 0–1.5)
COHB: 2.5 % (ref 0–1.5)
COHB: 3 % (ref 0–1.5)
CORONAVIRUS 229E BY PCR: NOT DETECTED
CORONAVIRUS HKU1 BY PCR: NOT DETECTED
CORONAVIRUS NL63 BY PCR: NOT DETECTED
CORONAVIRUS OC43 BY PCR: NOT DETECTED
CREAT SERPL-MCNC: 0.9 MG/DL (ref 0.7–1.2)
CRITICAL: ABNORMAL
D DIMER: 221 NG/ML DDU
DATE ANALYZED: ABNORMAL
DATE OF COLLECTION: ABNORMAL
EKG ATRIAL RATE: 109 BPM
EKG P AXIS: 38 DEGREES
EKG P-R INTERVAL: 128 MS
EKG Q-T INTERVAL: 312 MS
EKG QRS DURATION: 88 MS
EKG QTC CALCULATION (BAZETT): 420 MS
EKG R AXIS: 70 DEGREES
EKG T AXIS: 51 DEGREES
EKG VENTRICULAR RATE: 109 BPM
EOSINOPHILS ABSOLUTE: 0.39 E9/L (ref 0.05–0.5)
EOSINOPHILS RELATIVE PERCENT: 3.5 % (ref 0–6)
FIO2: 40 %
FIO2: 45 %
GFR AFRICAN AMERICAN: >60
GFR NON-AFRICAN AMERICAN: >60 ML/MIN/1.73
GLUCOSE BLD-MCNC: 119 MG/DL (ref 74–99)
HCO3: 42.9 MMOL/L (ref 22–26)
HCO3: 47.4 MMOL/L (ref 22–26)
HCO3: 50.8 MMOL/L (ref 22–26)
HCT VFR BLD CALC: 37.8 % (ref 37–54)
HEMOGLOBIN: 11.1 G/DL (ref 12.5–16.5)
HHB: 1.8 % (ref 0–5)
HHB: 3.1 % (ref 0–5)
HHB: 6.3 % (ref 0–5)
HUMAN METAPNEUMOVIRUS BY PCR: NOT DETECTED
HUMAN RHINOVIRUS/ENTEROVIRUS BY PCR: NOT DETECTED
IMMATURE GRANULOCYTES #: 0.03 E9/L
IMMATURE GRANULOCYTES %: 0.3 % (ref 0–5)
INFLUENZA A BY PCR: NOT DETECTED
INFLUENZA A BY PCR: NOT DETECTED
INFLUENZA B BY PCR: NOT DETECTED
INFLUENZA B BY PCR: NOT DETECTED
LAB: ABNORMAL
LYMPHOCYTES ABSOLUTE: 1.39 E9/L (ref 1.5–4)
LYMPHOCYTES RELATIVE PERCENT: 12.5 % (ref 20–42)
Lab: ABNORMAL
MCH RBC QN AUTO: 30.6 PG (ref 26–35)
MCHC RBC AUTO-ENTMCNC: 29.4 % (ref 32–34.5)
MCV RBC AUTO: 104.1 FL (ref 80–99.9)
METER GLUCOSE: 146 MG/DL (ref 74–99)
METER GLUCOSE: 149 MG/DL (ref 74–99)
METHB: 0.2 % (ref 0–1.5)
METHB: 0.3 % (ref 0–1.5)
METHB: 0.4 % (ref 0–1.5)
MODE: ABNORMAL
MONOCYTES ABSOLUTE: 0.9 E9/L (ref 0.1–0.95)
MONOCYTES RELATIVE PERCENT: 8.1 % (ref 2–12)
MYCOPLASMA PNEUMONIAE BY PCR: NOT DETECTED
NEUTROPHILS ABSOLUTE: 8.4 E9/L (ref 1.8–7.3)
NEUTROPHILS RELATIVE PERCENT: 75.3 % (ref 43–80)
O2 CONTENT: 15.7 ML/DL
O2 CONTENT: 16.3 ML/DL
O2 CONTENT: 16.5 ML/DL
O2 SATURATION: 93.5 % (ref 92–98.5)
O2 SATURATION: 96.8 % (ref 92–98.5)
O2 SATURATION: 98.1 % (ref 92–98.5)
O2HB: 90.4 % (ref 94–97)
O2HB: 94.2 % (ref 94–97)
O2HB: 95.3 % (ref 94–97)
OPERATOR ID: 1868
OPERATOR ID: 3342
OPERATOR ID: 3342
PARAINFLUENZA VIRUS 1 BY PCR: NOT DETECTED
PARAINFLUENZA VIRUS 2 BY PCR: NOT DETECTED
PARAINFLUENZA VIRUS 3 BY PCR: NOT DETECTED
PARAINFLUENZA VIRUS 4 BY PCR: NOT DETECTED
PATIENT TEMP: 37 C
PCO2: 100.3 MMHG (ref 35–45)
PCO2: 104.8 MMHG (ref 35–45)
PCO2: 86.7 MMHG (ref 35–45)
PDW BLD-RTO: 12.2 FL (ref 11.5–15)
PEEP/CPAP: 6 CMH2O
PFO2: 2.3 MMHG/%
PFO2: 2.53 MMHG/%
PH BLOOD GAS: 7.29 (ref 7.35–7.45)
PH BLOOD GAS: 7.3 (ref 7.35–7.45)
PH BLOOD GAS: 7.31 (ref 7.35–7.45)
PLATELET # BLD: 351 E9/L (ref 130–450)
PMV BLD AUTO: 9.4 FL (ref 7–12)
PO2: 113.9 MMHG (ref 75–100)
PO2: 70.5 MMHG (ref 75–100)
PO2: 91.9 MMHG (ref 75–100)
POTASSIUM SERPL-SCNC: 5.2 MMOL/L (ref 3.5–5)
PRO-BNP: 12 PG/ML (ref 0–125)
PS: 14 CMH20
RBC # BLD: 3.63 E12/L (ref 3.8–5.8)
RESPIRATORY SYNCYTIAL VIRUS BY PCR: NOT DETECTED
RI(T): 67 %
RI(T): 91 %
SODIUM BLD-SCNC: 141 MMOL/L (ref 132–146)
SOURCE, BLOOD GAS: ABNORMAL
THB: 12 G/DL (ref 11.5–16.5)
THB: 12.3 G/DL (ref 11.5–16.5)
THB: 12.4 G/DL (ref 11.5–16.5)
TIME ANALYZED: 1222
TIME ANALYZED: 1348
TIME ANALYZED: 1508
TROPONIN: <0.01 NG/ML (ref 0–0.03)
WBC # BLD: 11.1 E9/L (ref 4.5–11.5)

## 2020-02-12 PROCEDURE — 93005 ELECTROCARDIOGRAM TRACING: CPT | Performed by: EMERGENCY MEDICINE

## 2020-02-12 PROCEDURE — 2580000003 HC RX 258: Performed by: CLINICAL NURSE SPECIALIST

## 2020-02-12 PROCEDURE — APPSS45 APP SPLIT SHARED TIME 31-45 MINUTES: Performed by: CLINICAL NURSE SPECIALIST

## 2020-02-12 PROCEDURE — 6360000002 HC RX W HCPCS: Performed by: CLINICAL NURSE SPECIALIST

## 2020-02-12 PROCEDURE — 99285 EMERGENCY DEPT VISIT HI MDM: CPT

## 2020-02-12 PROCEDURE — 93971 EXTREMITY STUDY: CPT

## 2020-02-12 PROCEDURE — 82962 GLUCOSE BLOOD TEST: CPT

## 2020-02-12 PROCEDURE — 85025 COMPLETE CBC W/AUTO DIFF WBC: CPT

## 2020-02-12 PROCEDURE — 83880 ASSAY OF NATRIURETIC PEPTIDE: CPT

## 2020-02-12 PROCEDURE — 80048 BASIC METABOLIC PNL TOTAL CA: CPT

## 2020-02-12 PROCEDURE — 99222 1ST HOSP IP/OBS MODERATE 55: CPT | Performed by: INTERNAL MEDICINE

## 2020-02-12 PROCEDURE — 82805 BLOOD GASES W/O2 SATURATION: CPT

## 2020-02-12 PROCEDURE — 93010 ELECTROCARDIOGRAM REPORT: CPT | Performed by: INTERNAL MEDICINE

## 2020-02-12 PROCEDURE — 84484 ASSAY OF TROPONIN QUANT: CPT

## 2020-02-12 PROCEDURE — 94640 AIRWAY INHALATION TREATMENT: CPT

## 2020-02-12 PROCEDURE — 87502 INFLUENZA DNA AMP PROBE: CPT

## 2020-02-12 PROCEDURE — 96374 THER/PROPH/DIAG INJ IV PUSH: CPT

## 2020-02-12 PROCEDURE — 6370000000 HC RX 637 (ALT 250 FOR IP): Performed by: CLINICAL NURSE SPECIALIST

## 2020-02-12 PROCEDURE — 6370000000 HC RX 637 (ALT 250 FOR IP): Performed by: EMERGENCY MEDICINE

## 2020-02-12 PROCEDURE — 94660 CPAP INITIATION&MGMT: CPT

## 2020-02-12 PROCEDURE — 2060000000 HC ICU INTERMEDIATE R&B

## 2020-02-12 PROCEDURE — 0100U HC RESPIRPTHGN MULT REV TRANS & AMP PRB TECH 21 TRGT: CPT

## 2020-02-12 PROCEDURE — 94664 DEMO&/EVAL PT USE INHALER: CPT

## 2020-02-12 PROCEDURE — 36415 COLL VENOUS BLD VENIPUNCTURE: CPT

## 2020-02-12 PROCEDURE — 85378 FIBRIN DEGRADE SEMIQUANT: CPT

## 2020-02-12 PROCEDURE — 6360000002 HC RX W HCPCS: Performed by: EMERGENCY MEDICINE

## 2020-02-12 PROCEDURE — 71045 X-RAY EXAM CHEST 1 VIEW: CPT

## 2020-02-12 PROCEDURE — 6360000002 HC RX W HCPCS

## 2020-02-12 RX ORDER — ALBUTEROL SULFATE 2.5 MG/3ML
2.5 SOLUTION RESPIRATORY (INHALATION) EVERY 4 HOURS PRN
Status: DISCONTINUED | OUTPATIENT
Start: 2020-02-12 | End: 2020-02-13 | Stop reason: HOSPADM

## 2020-02-12 RX ORDER — SODIUM CHLORIDE 0.9 % (FLUSH) 0.9 %
10 SYRINGE (ML) INJECTION PRN
Status: DISCONTINUED | OUTPATIENT
Start: 2020-02-12 | End: 2020-02-13 | Stop reason: HOSPADM

## 2020-02-12 RX ORDER — DEXTROSE MONOHYDRATE 25 G/50ML
12.5 INJECTION, SOLUTION INTRAVENOUS PRN
Status: DISCONTINUED | OUTPATIENT
Start: 2020-02-12 | End: 2020-02-13 | Stop reason: HOSPADM

## 2020-02-12 RX ORDER — NICOTINE POLACRILEX 4 MG
15 LOZENGE BUCCAL PRN
Status: DISCONTINUED | OUTPATIENT
Start: 2020-02-12 | End: 2020-02-13 | Stop reason: HOSPADM

## 2020-02-12 RX ORDER — HYDROCHLOROTHIAZIDE 25 MG/1
25 TABLET ORAL DAILY
Status: DISCONTINUED | OUTPATIENT
Start: 2020-02-12 | End: 2020-02-13 | Stop reason: HOSPADM

## 2020-02-12 RX ORDER — LISINOPRIL 20 MG/1
20 TABLET ORAL DAILY
Status: DISCONTINUED | OUTPATIENT
Start: 2020-02-12 | End: 2020-02-13 | Stop reason: HOSPADM

## 2020-02-12 RX ORDER — ALBUTEROL SULFATE 90 UG/1
2 AEROSOL, METERED RESPIRATORY (INHALATION) EVERY 4 HOURS PRN
Status: DISCONTINUED | OUTPATIENT
Start: 2020-02-12 | End: 2020-02-12 | Stop reason: ALTCHOICE

## 2020-02-12 RX ORDER — FUROSEMIDE 10 MG/ML
INJECTION INTRAMUSCULAR; INTRAVENOUS
Status: COMPLETED
Start: 2020-02-12 | End: 2020-02-12

## 2020-02-12 RX ORDER — ARFORMOTEROL TARTRATE 15 UG/2ML
15 SOLUTION RESPIRATORY (INHALATION) 2 TIMES DAILY
Status: DISCONTINUED | OUTPATIENT
Start: 2020-02-12 | End: 2020-02-13 | Stop reason: HOSPADM

## 2020-02-12 RX ORDER — NICOTINE 21 MG/24HR
1 PATCH, TRANSDERMAL 24 HOURS TRANSDERMAL DAILY
Status: DISCONTINUED | OUTPATIENT
Start: 2020-02-12 | End: 2020-02-13 | Stop reason: HOSPADM

## 2020-02-12 RX ORDER — LISINOPRIL AND HYDROCHLOROTHIAZIDE 25; 20 MG/1; MG/1
1 TABLET ORAL DAILY
Status: DISCONTINUED | OUTPATIENT
Start: 2020-02-12 | End: 2020-02-12 | Stop reason: ALTCHOICE

## 2020-02-12 RX ORDER — ONDANSETRON 2 MG/ML
4 INJECTION INTRAMUSCULAR; INTRAVENOUS EVERY 6 HOURS PRN
Status: DISCONTINUED | OUTPATIENT
Start: 2020-02-12 | End: 2020-02-13 | Stop reason: HOSPADM

## 2020-02-12 RX ORDER — ACETAMINOPHEN 325 MG/1
650 TABLET ORAL EVERY 4 HOURS PRN
Status: DISCONTINUED | OUTPATIENT
Start: 2020-02-12 | End: 2020-02-13 | Stop reason: HOSPADM

## 2020-02-12 RX ORDER — BUDESONIDE AND FORMOTEROL FUMARATE DIHYDRATE 160; 4.5 UG/1; UG/1
2 AEROSOL RESPIRATORY (INHALATION) 2 TIMES DAILY
Status: DISCONTINUED | OUTPATIENT
Start: 2020-02-12 | End: 2020-02-12 | Stop reason: ALTCHOICE

## 2020-02-12 RX ORDER — FUROSEMIDE 10 MG/ML
40 INJECTION INTRAMUSCULAR; INTRAVENOUS ONCE
Status: COMPLETED | OUTPATIENT
Start: 2020-02-12 | End: 2020-02-12

## 2020-02-12 RX ORDER — BUDESONIDE 0.5 MG/2ML
0.5 INHALANT ORAL 2 TIMES DAILY
Status: DISCONTINUED | OUTPATIENT
Start: 2020-02-12 | End: 2020-02-13 | Stop reason: HOSPADM

## 2020-02-12 RX ORDER — DEXTROSE MONOHYDRATE 50 MG/ML
100 INJECTION, SOLUTION INTRAVENOUS PRN
Status: DISCONTINUED | OUTPATIENT
Start: 2020-02-12 | End: 2020-02-13 | Stop reason: HOSPADM

## 2020-02-12 RX ORDER — SODIUM CHLORIDE 0.9 % (FLUSH) 0.9 %
10 SYRINGE (ML) INJECTION EVERY 12 HOURS SCHEDULED
Status: DISCONTINUED | OUTPATIENT
Start: 2020-02-12 | End: 2020-02-13 | Stop reason: HOSPADM

## 2020-02-12 RX ORDER — METHYLPREDNISOLONE SODIUM SUCCINATE 40 MG/ML
40 INJECTION, POWDER, LYOPHILIZED, FOR SOLUTION INTRAMUSCULAR; INTRAVENOUS EVERY 8 HOURS
Status: DISCONTINUED | OUTPATIENT
Start: 2020-02-12 | End: 2020-02-13 | Stop reason: HOSPADM

## 2020-02-12 RX ORDER — IPRATROPIUM BROMIDE AND ALBUTEROL SULFATE 2.5; .5 MG/3ML; MG/3ML
3 SOLUTION RESPIRATORY (INHALATION) ONCE
Status: COMPLETED | OUTPATIENT
Start: 2020-02-12 | End: 2020-02-12

## 2020-02-12 RX ORDER — METHYLPREDNISOLONE SODIUM SUCCINATE 125 MG/2ML
80 INJECTION, POWDER, LYOPHILIZED, FOR SOLUTION INTRAMUSCULAR; INTRAVENOUS ONCE
Status: COMPLETED | OUTPATIENT
Start: 2020-02-12 | End: 2020-02-12

## 2020-02-12 RX ORDER — SODIUM CHLORIDE 0.9 % (FLUSH) 0.9 %
10 SYRINGE (ML) INJECTION PRN
Status: DISCONTINUED | OUTPATIENT
Start: 2020-02-12 | End: 2020-02-12 | Stop reason: SDUPTHER

## 2020-02-12 RX ADMIN — ARFORMOTEROL TARTRATE 15 MCG: 15 SOLUTION RESPIRATORY (INHALATION) at 19:06

## 2020-02-12 RX ADMIN — FUROSEMIDE 40 MG: 10 INJECTION, SOLUTION INTRAMUSCULAR; INTRAVENOUS at 17:38

## 2020-02-12 RX ADMIN — IPRATROPIUM BROMIDE 0.5 MG: 0.5 SOLUTION RESPIRATORY (INHALATION) at 19:07

## 2020-02-12 RX ADMIN — BUDESONIDE 500 MCG: 0.5 SUSPENSION RESPIRATORY (INHALATION) at 19:07

## 2020-02-12 RX ADMIN — METHYLPREDNISOLONE SODIUM SUCCINATE 80 MG: 125 INJECTION, POWDER, FOR SOLUTION INTRAMUSCULAR; INTRAVENOUS at 12:25

## 2020-02-12 RX ADMIN — INSULIN LISPRO 1 UNITS: 100 INJECTION, SOLUTION INTRAVENOUS; SUBCUTANEOUS at 20:48

## 2020-02-12 RX ADMIN — LISINOPRIL 20 MG: 20 TABLET ORAL at 17:38

## 2020-02-12 RX ADMIN — METHYLPREDNISOLONE SODIUM SUCCINATE 40 MG: 40 INJECTION, POWDER, LYOPHILIZED, FOR SOLUTION INTRAMUSCULAR; INTRAVENOUS at 17:38

## 2020-02-12 RX ADMIN — HYDROCHLOROTHIAZIDE 25 MG: 25 TABLET ORAL at 17:39

## 2020-02-12 RX ADMIN — IPRATROPIUM BROMIDE AND ALBUTEROL SULFATE 3 AMPULE: .5; 3 SOLUTION RESPIRATORY (INHALATION) at 12:22

## 2020-02-12 RX ADMIN — ENOXAPARIN SODIUM 40 MG: 40 INJECTION SUBCUTANEOUS at 17:40

## 2020-02-12 RX ADMIN — SODIUM CHLORIDE, PRESERVATIVE FREE 10 ML: 5 INJECTION INTRAVENOUS at 20:48

## 2020-02-12 RX ADMIN — FUROSEMIDE 40 MG: 10 INJECTION INTRAMUSCULAR; INTRAVENOUS at 17:38

## 2020-02-12 ASSESSMENT — ENCOUNTER SYMPTOMS
COUGH: 1
SHORTNESS OF BREATH: 1
WHEEZING: 1
NAUSEA: 0
HEMOPTYSIS: 0
BACK PAIN: 0
ABDOMINAL PAIN: 0
VOMITING: 0
EYE DISCHARGE: 0
SORE THROAT: 0
EYE PAIN: 0
SINUS PRESSURE: 0
DIARRHEA: 0
EYE REDNESS: 0

## 2020-02-12 ASSESSMENT — PAIN SCALES - GENERAL
PAINLEVEL_OUTOF10: 0
PAINLEVEL_OUTOF10: 0

## 2020-02-12 NOTE — H&P
[facility] History and Physical      CHIEF COMPLAINT: My legs are swollen and I am short of breath    History of Present Illness: This is a 76-year-old -American man admitted to the emergency room today because he was having worsening swelling in his lower extremities bilaterally along with shortness of breath. This started about a week ago the patient tells me he may have gained some weight and noticed his legs were swollen the right worse than the left. He also mentions being short of breath more when he goes up and down steps or does activity then when at rest.  Patient tells me he has a history of hypertension could be prediabetic and sleep apnea. He also has a history of COPD and follows with pulmonary as an outpatient. Looking back he is got noted COPD that is been severe, extensive lung emphysema. Today he said his legs were very painful and his breathing was worse so he knew he had to come into the emergency room. He uses Symbicort, Spiriva, Ventolin, albuterol as an outpatient. His blood gases initially were done at 1227 today. pH is 7.2, PCO2 100.3, PO2 of 70.5 and a bicarb of 47.4  Was placed on BiPAP, given Solu-Medrol and DuoNeb treatments. Influenza panel sent. Opponent was negative. Peak blood gases at 3:10 PM showing pH of 7.3, PCO2 86.7, PO2 91.9, bicarb 42.9. Potassium was 5.2. Patient being admitted with hypercapnic Hypoxemia. Lower leg edema negative for DVT    Informant(s) for H&P: Patient and review of medical chart    REVIEW OF SYSTEMS:  A comprehensive review of systems was negative except for: what is in the HPI  Patient denied any fever or chills, no headache or blurry vision, + SOB, no palpitation, no chest pain, patient denied any abdominal pain no nausea or vomiting no diarrhea constipation, no hemoptysis or hematemesis, no melena or hematochezia. Patient denied any weakness or numbness.   Does claim to have some difficulty starting and stopping urinary stream at times    PMH:  Past Medical History:   Diagnosis Date    COPD [facility]chronic obstructive pulmonary disease) ([facility] [address]     Hypertension        Surgical History:  History reviewed. No pertinent surgical history. Medications Prior to Admission:    Prior to Admission medications    Medication Sig Start Date End Date Taking? Authorizing Provider   budesonide-formoterol (SYMBICORT) 160-4.5 MCG/ACT AERO Inhale 2 puffs into the lungs 2 times daily 20   Altaf Alberto MD   tiotropium (SPIRIVA HANDIHALER) 18 MCG inhalation capsule Inhale 1 capsule into the lungs daily 19   Daniel Chatterjee MD   lisinopril-hydrochlorothiazide (PRINZIDE;ZESTORETIC) 20-25 MG per tablet Take 1 tablet by mouth daily 19   Ash Kern DO   albuterol sulfate HFA (VENTOLIN HFA) 108 (90 Base) MCG/ACT inhaler INHALE 2 PUFFS BY MOUTH INTO THE LUNGS EVERY 4 HOURS AS NEEDED FOR WHEEZING 11/15/19   Daniel Chatterjee MD   varenicline (CHANTIX) 1 MG tablet TAKE 1 TABLET BY MOUTH TWICE DAILY 19   Daniel Chatterjee MD   ipratropium-albuterol (DUONEB) 0.5-2.5 (3) MG/3ML SOLN nebulizer solution  16   Historical Provider, MD       Allergies: Iv dye [iodides]    Social History:    reports that he has been smoking. He has a 20.00 pack-year smoking history. He has never used smokeless tobacco. He reports current drug use. Drug: Marijuana. He reports that he does not drink alcohol. Patient tells me that he is single, 3 children, lives on the [place] [id number]. Lives at home. 1 pet dog. Is not working at this time. 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  Remote history of marijuana use. 20-pack-year history of smoking. She recently quit with Chantix    Family History:   family history is not on file.    Mother   in her 70s-unknown cause per patient  Father  also in his 76s -unknown cause  He reports family history of diabetes       PHYSICAL

## 2020-02-12 NOTE — ED PROVIDER NOTES
Extraocular Movements: Extraocular movements intact. Pupils: Pupils are equal, round, and reactive to light. Cardiovascular:      Rate and Rhythm: Regular rhythm. Tachycardia present. Pulses: Normal pulses. Heart sounds: Normal heart sounds. Pulmonary:      Effort: Respiratory distress present. Breath sounds: Wheezing present. Abdominal:      General: Abdomen is flat. Bowel sounds are normal.   Musculoskeletal: Normal range of motion. Right lower leg: Edema present. Left lower leg: No edema. Skin:     General: Skin is warm. Capillary Refill: Capillary refill takes less than 2 seconds. Neurological:      General: No focal deficit present. Mental Status: He is alert and oriented to person, place, and time. Procedures     [facility]           --------------------------------------------- PAST HISTORY ---------------------------------------------  Past Medical History:  has a past medical history of COPD (chronic obstructive pulmonary disease) ([address] and Hypertension. Past Surgical History:  has no past surgical history on file. Social History:  reports that he has been smoking. He has a 20.00 pack-year smoking history. He has never used smokeless tobacco. He reports current drug use. Drug: Marijuana. He reports that he does not drink alcohol. Family History: family history is not on file. The patients home medications have been reviewed. Allergies:  Iv dye [iodides]    -------------------------------------------------- RESULTS -------------------------------------------------    Lab  Results for orders placed or performed during the hospital encounter of 02/12/20   Rapid influenza A/B antigens   Result Value Ref Range    Influenza A by PCR Not Detected Not Detected    Influenza B by PCR Not Detected Not Detected   CBC Auto Differential   Result Value Ref Range    WBC 11.1 4.5 - 11.5 E9/L    RBC 3.63 (L) 3.80 - 5.80 E12/L    Hemoglobin 11.1 (L) 12.5 - Arterial   Result Value Ref Range    Date Analyzed 2020     Time Analyzed 1348     Source: Blood Arterial     pH, Blood Gas 7.303 (L) 7.350 - 7.450    PCO2 104.8 (HH) 35.0 - 45.0 mmHg    PO2 113.9 (H) 75.0 - 100.0 mmHg    HCO3 50.8 (H) 22.0 - 26.0 mmol/L    B.E. 19.6 (H) -3.0 - 3.0 mmol/L    O2 Sat 98.1 92.0 - 98.5 %    PO2/FIO2 2.53 mmHg/%    AaDO2 76.5 mmHg    RI(T) 67 %    O2Hb 95.3 94.0 - 97.0 %    COHb 2.5 (H) 0.0 - 1.5 %    MetHb 0.4 0.0 - 1.5 %    O2 Content 16.3 mL/dL    HHb 1.8 0.0 - 5.0 %    tHb (est) 12.0 11.5 - 16.5 g/dL    Mode BIPAP     FIO2 45.0 %    Peep/Cpap 6.0 cmH2O    PS 14 cmH20    Date Of Collection      Time Collected      Pt Temp 37.0 C     ID [id number]     Lab [id number]     Critical(s) Notified Handed report to Dr/RN    Blood Gas, Arterial   Result Value Ref Range    Date Analyzed 2020     Time Analyzed 1508     Source: Blood Arterial     pH, Blood Gas 7.312 (L) 7.350 - 7.450    PCO2 86.7 (HH) 35.0 - 45.0 mmHg    PO2 91.9 75.0 - 100.0 mmHg    HCO3 42.9 (H) 22.0 - 26.0 mmol/L    B.E. 13.1 (H) -3.0 - 3.0 mmol/L    O2 Sat 96.8 92.0 - 98.5 %    PO2/FIO2 2.30 mmHg/%    AaDO2 83.6 mmHg    RI(T) 91 %    O2Hb 94.2 94.0 - 97.0 %    COHb 2.5 (H) 0.0 - 1.5 %    MetHb 0.2 0.0 - 1.5 %    O2 Content 16.5 mL/dL    HHb 3.1 0.0 - 5.0 %    tHb (est) 12.4 11.5 - 16.5 g/dL    Mode avaps     FIO2 40.0 %    Date Of Collection      Time Collected      Pt Temp 37.0 C     ID [id number]     Lab [id number]     Critical(s) Notified Handed report to Dr/RN    EKG 12 Lead   Result Value Ref Range    Ventricular Rate 109 BPM    Atrial Rate 109 BPM    P-R Interval 128 ms    QRS Duration 88 ms    Q-T Interval 312 ms    QTc Calculation (Bazett) 420 ms    P Axis 38 degrees    R Axis 70 degrees    T Axis 51 degrees       Radiology  Xr Chest Portable    Result Date: 2020  Patient MRN:  [id number] : 1960 Age: 60 years Gender: Male Order Date:  2020 12:15 PM EXAM: XR CHEST PORTABLE COMPARISON: May 12, 2016 INDICATION:  shortness of breath, wheezing shortness of breath, wheezing FINDINGS: There are chronic coarsened interstitial lung markings bilaterally. There is hyperinflation of the lung fields in notable upper lobe distribution. No obvious airspace opacity or pleural effusion. The heart is normal in size. 1. Emphysematous changes. 2. No obvious airspace opacity or evidence of pleural effusion. Us Dup Lower Extremity Right Lisa    Result Date: 2020  Patient MRN:  [id number] : 1960 Age: 60 years Gender: Male Order Date:  2020 1:00 PM EXAM: US DUP LOWER EXTREMITY RIGHT LISA COMPARISON: None INDICATION:  Discomfort Discomfort TECHNIQUE: Multiple real-time sonographic images of the right lower extremity were obtained. FINDINGS: Normal compression and augmentation is seen. No evidence to suggest deep venous thrombosis. No additional sonographic abnormalities. No evidence to suggest deep venous thrombosis of the right lower extremity.     ------------------------- NURSING NOTES AND VITALS REVIEWED ---------------------------  Date / Time Roomed:  2020 12:02 PM  ED Bed Assignment:  CAREN/CAREN    The nursing notes within the ED encounter and vital signs as below have been reviewed. Patient Vitals for the past 24 hrs:   BP Temp Temp src Pulse Resp SpO2 Height Weight   20 1329 117/76 98.5 °F (36.9 °C) Axillary 97 16 100 % -- --   20 1245 -- -- -- -- 22 -- -- --   20 1222 -- -- -- -- 20 -- -- --   20 1209 (!) 170/76 98.6 °F (37 °C) Oral 110 26 97 % 6' (1.829 m) 219 lb (99.3 kg)       Oxygen Saturation Interpretation: Normal      ------------------------------------------ PROGRESS NOTES ------------------------------------------  Patient seen and examined. Labs and initial ABG were performed on patient's home oxygen. Patient found to be in significant hypercarbic respiratory failure as well as hypoxia.   Patient was placed on BiPAP initial review of BiPAP ABG shows worsening of CO2. Adjustments were made to volume patient had improvement of CO2. Otherwise work-up is reassuring patient was admitted to Dr. Pato Collins. Ultrasound of the right lower extremity reveals no DVT. A d-dimer was also performed and found to be reassuring at 221. Patient was felt safe for admission to a telemetry floor given improvement of carbon dioxide level. I have spoken with the patient and discussed todays results, in addition to providing specific details for the plan of care and counseling regarding the diagnosis and prognosis. Their questions are answered at this time and they are agreeable with the plan.      --------------------------------- ADDITIONAL PROVIDER NOTES ---------------------------------  This patient's ED course included: a personal history and physicial examination, re-evaluation prior to disposition, multiple bedside re-evaluations, IV medications, cardiac monitoring and continuous pulse oximetry    This patient has been closely monitored during their ED course. Please note that the withdrawal or failure to initiate urgent interventions for this patient would likely result in a life threatening deterioration or permanent disability. Accordingly this patient received 30 minutes of critical care time, excluding separately billable procedures. Clinical Impression  1. Acute respiratory failure with hypoxia and hypercapnia (HCC)    2. COPD exacerbation ([facility] [address]          Disposition  Patient's disposition: Admit to telemetry  Patient's condition is stable.          Pati Reed DO  02/12/20 1529

## 2020-02-13 VITALS
HEIGHT: 72 IN | HEART RATE: 90 BPM | DIASTOLIC BLOOD PRESSURE: 80 MMHG | OXYGEN SATURATION: 99 % | SYSTOLIC BLOOD PRESSURE: 160 MMHG | RESPIRATION RATE: 22 BRPM | WEIGHT: 220.6 LBS | TEMPERATURE: 97.7 F | BODY MASS INDEX: 29.88 KG/M2

## 2020-02-13 LAB
AADO2: 69 MMHG
ALBUMIN SERPL-MCNC: 4.1 G/DL (ref 3.5–5.2)
ALP BLD-CCNC: 62 U/L (ref 40–129)
ALT SERPL-CCNC: 10 U/L (ref 0–40)
ANION GAP SERPL CALCULATED.3IONS-SCNC: 7 MMOL/L (ref 7–16)
AST SERPL-CCNC: 9 U/L (ref 0–39)
B.E.: 15.4 MMOL/L (ref -3–3)
BASOPHILS ABSOLUTE: 0.01 E9/L (ref 0–0.2)
BASOPHILS RELATIVE PERCENT: 0.1 % (ref 0–2)
BILIRUB SERPL-MCNC: <0.2 MG/DL (ref 0–1.2)
BUN BLDV-MCNC: 19 MG/DL (ref 8–23)
CALCIUM SERPL-MCNC: 9 MG/DL (ref 8.6–10.2)
CHLORIDE BLD-SCNC: 88 MMOL/L (ref 98–107)
CO2: 43 MMOL/L (ref 22–29)
COHB: 1.3 % (ref 0–1.5)
CREAT SERPL-MCNC: 0.9 MG/DL (ref 0.7–1.2)
CRITICAL: ABNORMAL
DATE ANALYZED: ABNORMAL
DATE OF COLLECTION: ABNORMAL
EOSINOPHILS ABSOLUTE: 0 E9/L (ref 0.05–0.5)
EOSINOPHILS RELATIVE PERCENT: 0 % (ref 0–6)
FIO2: 40 %
GFR AFRICAN AMERICAN: >60
GFR NON-AFRICAN AMERICAN: >60 ML/MIN/1.73
GLUCOSE BLD-MCNC: 135 MG/DL (ref 74–99)
HBA1C MFR BLD: 5.4 % (ref 4–5.6)
HCO3: 45 MMOL/L (ref 22–26)
HCT VFR BLD CALC: 36 % (ref 37–54)
HEMOGLOBIN: 10.7 G/DL (ref 12.5–16.5)
HHB: 2.2 % (ref 0–5)
IMMATURE GRANULOCYTES #: 0.07 E9/L
IMMATURE GRANULOCYTES %: 0.6 % (ref 0–5)
LAB: ABNORMAL
LYMPHOCYTES ABSOLUTE: 0.44 E9/L (ref 1.5–4)
LYMPHOCYTES RELATIVE PERCENT: 3.8 % (ref 20–42)
Lab: ABNORMAL
MCH RBC QN AUTO: 30.7 PG (ref 26–35)
MCHC RBC AUTO-ENTMCNC: 29.7 % (ref 32–34.5)
MCV RBC AUTO: 103.2 FL (ref 80–99.9)
METER GLUCOSE: 143 MG/DL (ref 74–99)
METHB: 0.3 % (ref 0–1.5)
MODE: ABNORMAL
MONOCYTES ABSOLUTE: 0.18 E9/L (ref 0.1–0.95)
MONOCYTES RELATIVE PERCENT: 1.6 % (ref 2–12)
NEUTROPHILS ABSOLUTE: 10.9 E9/L (ref 1.8–7.3)
NEUTROPHILS RELATIVE PERCENT: 93.9 % (ref 43–80)
O2 CONTENT: 16.4 ML/DL
O2 SATURATION: 97.8 % (ref 92–98.5)
O2HB: 96.2 % (ref 94–97)
OPERATOR ID: ABNORMAL
PATIENT TEMP: 37 C
PCO2: 87 MMHG (ref 35–45)
PDW BLD-RTO: 12.2 FL (ref 11.5–15)
PEEP/CPAP: 6 CMH2O
PFO2: 2.65 MMHG/%
PH BLOOD GAS: 7.33 (ref 7.35–7.45)
PLATELET # BLD: 342 E9/L (ref 130–450)
PMV BLD AUTO: 9.6 FL (ref 7–12)
PO2: 106.2 MMHG (ref 75–100)
POIKILOCYTES: ABNORMAL
POTASSIUM SERPL-SCNC: 4.4 MMOL/L (ref 3.5–5)
RBC # BLD: 3.49 E12/L (ref 3.8–5.8)
RI(T): 65 %
RR MECHANICAL: 17 B/MIN
SODIUM BLD-SCNC: 138 MMOL/L (ref 132–146)
SOURCE, BLOOD GAS: ABNORMAL
STOMATOCYTES: ABNORMAL
THB: 12 G/DL (ref 11.5–16.5)
TIME ANALYZED: 47
TOTAL PROTEIN: 7.2 G/DL (ref 6.4–8.3)
VT MECHANICAL: 550 ML
WBC # BLD: 11.6 E9/L (ref 4.5–11.5)

## 2020-02-13 PROCEDURE — 6360000002 HC RX W HCPCS: Performed by: CLINICAL NURSE SPECIALIST

## 2020-02-13 PROCEDURE — 6370000000 HC RX 637 (ALT 250 FOR IP): Performed by: CLINICAL NURSE SPECIALIST

## 2020-02-13 PROCEDURE — 2700000000 HC OXYGEN THERAPY PER DAY

## 2020-02-13 PROCEDURE — 94640 AIRWAY INHALATION TREATMENT: CPT

## 2020-02-13 PROCEDURE — 99233 SBSQ HOSP IP/OBS HIGH 50: CPT | Performed by: INTERNAL MEDICINE

## 2020-02-13 PROCEDURE — 85025 COMPLETE CBC W/AUTO DIFF WBC: CPT

## 2020-02-13 PROCEDURE — 2580000003 HC RX 258: Performed by: CLINICAL NURSE SPECIALIST

## 2020-02-13 PROCEDURE — 82962 GLUCOSE BLOOD TEST: CPT

## 2020-02-13 PROCEDURE — 80053 COMPREHEN METABOLIC PANEL: CPT

## 2020-02-13 PROCEDURE — 94660 CPAP INITIATION&MGMT: CPT

## 2020-02-13 PROCEDURE — 83036 HEMOGLOBIN GLYCOSYLATED A1C: CPT

## 2020-02-13 PROCEDURE — 36415 COLL VENOUS BLD VENIPUNCTURE: CPT

## 2020-02-13 PROCEDURE — 82805 BLOOD GASES W/O2 SATURATION: CPT

## 2020-02-13 PROCEDURE — 99223 1ST HOSP IP/OBS HIGH 75: CPT | Performed by: INTERNAL MEDICINE

## 2020-02-13 RX ORDER — PREDNISONE 10 MG/1
TABLET ORAL
Qty: 20 TABLET | Refills: 0 | Status: SHIPPED | OUTPATIENT
Start: 2020-02-13 | End: 2020-02-20 | Stop reason: SDUPTHER

## 2020-02-13 RX ORDER — DOXYCYCLINE HYCLATE 100 MG
100 TABLET ORAL 2 TIMES DAILY
Qty: 14 TABLET | Refills: 0 | Status: SHIPPED | OUTPATIENT
Start: 2020-02-13 | End: 2020-02-20 | Stop reason: SDUPTHER

## 2020-02-13 RX ADMIN — IPRATROPIUM BROMIDE 0.5 MG: 0.5 SOLUTION RESPIRATORY (INHALATION) at 09:25

## 2020-02-13 RX ADMIN — METHYLPREDNISOLONE SODIUM SUCCINATE 40 MG: 40 INJECTION, POWDER, LYOPHILIZED, FOR SOLUTION INTRAMUSCULAR; INTRAVENOUS at 01:20

## 2020-02-13 RX ADMIN — INSULIN LISPRO 1 UNITS: 100 INJECTION, SOLUTION INTRAVENOUS; SUBCUTANEOUS at 07:42

## 2020-02-13 RX ADMIN — HYDROCHLOROTHIAZIDE 25 MG: 25 TABLET ORAL at 09:22

## 2020-02-13 RX ADMIN — METHYLPREDNISOLONE SODIUM SUCCINATE 40 MG: 40 INJECTION, POWDER, LYOPHILIZED, FOR SOLUTION INTRAMUSCULAR; INTRAVENOUS at 09:22

## 2020-02-13 RX ADMIN — SODIUM CHLORIDE, PRESERVATIVE FREE 10 ML: 5 INJECTION INTRAVENOUS at 09:24

## 2020-02-13 RX ADMIN — ARFORMOTEROL TARTRATE 15 MCG: 15 SOLUTION RESPIRATORY (INHALATION) at 09:25

## 2020-02-13 RX ADMIN — BUDESONIDE 500 MCG: 0.5 SUSPENSION RESPIRATORY (INHALATION) at 09:25

## 2020-02-13 RX ADMIN — LISINOPRIL 20 MG: 20 TABLET ORAL at 09:22

## 2020-02-13 NOTE — PROGRESS NOTES
Pt taken off Bipap at [phone] and around room. Educated pt on [place] is very important for him and put on Bipap at 56. Assessed pt spo2 is 95% at 0430. Continue monitor pt condition.

## 2020-02-13 NOTE — CONSULTS
TID WC HITESH Lemus        insulin lispro (HUMALOG) injection vial 0-3 Units  0-3 Units Subcutaneous Nightly HITESH Lemus   1 Units at 02/12/20 2048    glucose (GLUTOSE) 40 % oral gel 15 g  15 g Oral PRN HITESH Lemus        dextrose 50 % IV solution  12.5 g Intravenous PRN HITESH Lemus        glucagon (rDNA) injection 1 mg  1 mg Intramuscular PRN HITESH Lemus        dextrose 5 % solution  100 mL/hr Intravenous PRN HITESH Lemus           SOCIAL AND OCCUPATIONAL HEALTH:  Social History     Tobacco Use   Smoking Status Current Some Day Smoker    Packs/day: 0.50    Years: 40.00    Pack years: 20.00    Last attempt to quit: 05/2016    Years since quitting: 3.7   Smokeless Tobacco Never Used     TB :no  Pets dog  Industrial exposure:minimal  Birds :none    SURGICAL HISTORY:   History reviewed. No pertinent surgical history. FAMILY HISTORY:   Lung cancer:none  DVT or PE None     REVIEW OF SYSTEMS:  Constitutional: General health is good . There has been no weight changes. Mild fatigue no   Head: Patient denies any history of trauma, convulsive disorder or syncope. Skin:  Patient denies history of changes in pigmentation, eruptions or pruritus. No easy bruising or bleeding. EENT: no nasal congestion   Cardiovascular ,No chest pain ,No edema ,  Respiration:SOB yes  :  ,MANNING :yes ++  Gastrointestinal:No GI bleed ,no melena  ,no hematemesis    Musculoskeletal: no joint pain ,no swelling  Neurological:no , syncope. Denies twitching, convulsions, loss of consciousness or memory. Endocrine:  . No history of goiter, exophthalmos or dryness of skin. The patient has no history of diabetes. Hematopoietic:  No history of bleeding disorders or easy bruising. Rheumatic:  No connective tissue disease history or polyarthritis/inflammatory joint disease.       PHYSICAL EXAMINATION:  Vitals:    02/12/20 1911   BP:    Pulse:    Resp: 18 Temp:    SpO2:      Constitutional: This is a well developed, well nourished 60y.o. year old male who is alert, oriented, cooperative and in no apparent distress. Head was normocephalic and atraumatic. EENT: Mallampati class :  Extraocular muscles intact. External canals are patent and no discharge was appreciated. Septum was midline,   mucosa was without erythema, exudates or cobblestoning. No thrush was noted. Neck: Supple without thyromegaly. No elevated JVP. Trachea was midline. No carotid bruits were auscultated. Respiratory: Rhonchi bilateral    Cardiovascular: Regular without murmur, clicks, gallops or rubs. There is no left or right ventricular heave. Pulses:  Carotid, radial and femoral pulses were equally bilaterally. Abdomen: Slightly rounded and soft without organomegaly. No rebound, rigidity or guarding was appreciated. Lymphatic: No lymphadenopathy. Musculoskeletal: No joint deformity  Extremities: Mild swelling bilaterally +1 edema  Skin:  Warm and dry. Good color, turgor and pigmentation. No lesions or scars. Neurological/Psychiatric: The patient's general behavior, level of consciousness, thought content and emotional status is normal.  Cranial nerves II-XII are intact. DATA:     FVC 1.37 which is 50% of predicted   FEV1 0.62 which is 17% of predicted    FEV1/FVC 45 which is 57 of predicted with lower limit of normal 68     MVV 22 which is 14% of predicted   impression very severe obstruction  DLCO 38 as.   Last PFT which is booked him a severe reduction in DLCO    1 antitrypsin 167 with phenotype MM    IMPRESSION:      Acute Hypercapnic resp failure   Acute COPD exacerbation   1-very severe COPD  2-obstructive sleep apnea  3-obesity  4--chronic hypoxic respiratory failure  5-Nicotine dependence    PLAN:        *-IV steroids   *-IV abx   *-negative  viral panel   *- Sputum culture   *_AVAPS  *- procalcitonin  *-BD  ICS       Explain to patient that he must stay and he has life threatening and  and he has to stay on IV steroids and abx, if  he leave early that he put his life at risk and possible cardiopulmonary arrest     He express understanding of his condition and state no way he can stay and he  must go home. When ask if I can help or SW to keep him ,states no way but him self going        Will try arrange trilogy   Need steroids  Nebs  Home inhalers      Thank you for allowing me to participate in [facility].  I will keep following with you ,should you have any concerns ,please contact me at [phone]       We'll see the patient in 6 month after he does his CT , and explained to him if the CAT scan shows any abnormality we will call him to get him earlier      Sincerely,        Deonte Humphreys MD  Pulmonary & Critical Care Medicine

## 2020-02-13 NOTE — PROGRESS NOTES
Explain to patient that he must stay and he has life threatening and  and he has to stay on IV steroids and abx, if  he leave early that he put his life at risk and possible cardiopulmonary arrest    He express understanding of his condition and state no way he can stay and he  must go home.   When ask if I can help or SW to keep him ,states no way but him self going      Will try arrange trilogy   Need steroids  Nebs  Home inhalers

## 2020-02-13 NOTE — PROGRESS NOTES
Pt has decided to leave AMA. Discussed risk of readmission or poor health safety at home. Dr. Lilly Lacy notified via secure text. Dr. Mario Pearl called, awaiting callback. Pt refused CXR. Just spoke with Dr. Lilly Lacy. He is coming to see patient now. Patient agreeable to waiting.   Electronically signed by Bertha Quan RN on 2/13/2020 at 9:05 AM

## 2020-02-13 NOTE — PROGRESS NOTES
Assessed pt spo2 is 100% on Bipap at 0056 and pt c/o sob. Respiratory coming bedside for treatment at 0058. Continue monitor pt condition.

## 2020-02-13 NOTE — PROGRESS NOTES
Reassessed pt spo2 is 99% on Bipap. Pt stated feel better after respiratory treatment. Pt no distress this time. Continue monitor pt's condition.

## 2020-02-13 NOTE — PROGRESS NOTES
Date: 2/13/2020    Time: 12:37 AM    Patient Placed On BIPAP/CPAP/ Non-Invasive Ventilation? YesYes    If no must comment. Facial area red/color change? No           If YES are Blister/Lesion present? No   If yes must notify nursing staff  BIPAP/CPAP skin barrier?   Yes    Skin barrier type:duoderm       Comments:        Adolfo Velazquez

## 2020-02-13 NOTE — PROGRESS NOTES
Consult received  Order for AVAPS and labs placed  Will see in am  Repeat abg if not better ,may observe in ICU

## 2020-02-13 NOTE — CARE COORDINATION
Social work / Discharge planning:       Social work consult noted regarding \"trilogy arrangemnt\"         [employer] work and [facility] met with patient to discuss discharge plan. He is agreeable for NIV. DME choices provided. He requests [employer]. Referral made to the liaison.     Awaiting update on status of referral.  Electronically signed by SHARMIN Goodwin on 2/13/2020 at 10:41 AM

## 2020-02-20 RX ORDER — DOXYCYCLINE HYCLATE 100 MG
100 TABLET ORAL 2 TIMES DAILY
Qty: 14 TABLET | Refills: 0 | Status: SHIPPED | OUTPATIENT
Start: 2020-02-20 | End: 2020-07-16 | Stop reason: SDUPTHER

## 2020-02-20 RX ORDER — PREDNISONE 10 MG/1
TABLET ORAL
Qty: 20 TABLET | Refills: 0 | Status: ON HOLD | OUTPATIENT
Start: 2020-02-20 | End: 2020-06-25 | Stop reason: HOSPADM

## 2020-04-07 RX ORDER — VARENICLINE TARTRATE 1 MG/1
TABLET, FILM COATED ORAL
Qty: 60 TABLET | Refills: 1 | Status: SHIPPED | OUTPATIENT
Start: 2020-04-07 | End: 2020-07-10

## 2020-05-04 RX ORDER — BUDESONIDE AND FORMOTEROL FUMARATE DIHYDRATE 160; 4.5 UG/1; UG/1
2 AEROSOL RESPIRATORY (INHALATION) 2 TIMES DAILY
Qty: 1 INHALER | Refills: 5 | Status: SHIPPED | OUTPATIENT
Start: 2020-05-04 | End: 2020-11-11 | Stop reason: SDUPTHER

## 2020-05-12 ENCOUNTER — TELEPHONE (OUTPATIENT)
Dept: PULMONOLOGY | Age: 60
End: 2020-05-12

## 2020-06-14 ENCOUNTER — HOSPITAL ENCOUNTER (INPATIENT)
Age: 60
LOS: 11 days | Discharge: LEFT BEFORE DISCHARGE | DRG: 130 | End: 2020-06-25
Attending: INTERNAL MEDICINE | Admitting: INTERNAL MEDICINE
Payer: MEDICAID

## 2020-06-14 ENCOUNTER — HOSPITAL ENCOUNTER (EMERGENCY)
Age: 60
Discharge: ANOTHER HOSPITAL | End: 2020-06-14
Attending: EMERGENCY MEDICINE
Payer: MEDICAID

## 2020-06-14 ENCOUNTER — APPOINTMENT (OUTPATIENT)
Dept: GENERAL RADIOLOGY | Age: 60
DRG: 130 | End: 2020-06-14
Attending: INTERNAL MEDICINE
Payer: MEDICAID

## 2020-06-14 ENCOUNTER — APPOINTMENT (OUTPATIENT)
Dept: GENERAL RADIOLOGY | Age: 60
End: 2020-06-14
Payer: MEDICAID

## 2020-06-14 VITALS
WEIGHT: 240 LBS | RESPIRATION RATE: 24 BRPM | DIASTOLIC BLOOD PRESSURE: 73 MMHG | SYSTOLIC BLOOD PRESSURE: 115 MMHG | OXYGEN SATURATION: 100 % | TEMPERATURE: 97.2 F | HEART RATE: 89 BPM | BODY MASS INDEX: 32.55 KG/M2

## 2020-06-14 PROBLEM — J96.90 RESPIRATORY FAILURE (HCC): Status: ACTIVE | Noted: 2020-06-14

## 2020-06-14 LAB
AADO2: 115.9 MMHG
ALBUMIN SERPL-MCNC: 3.5 G/DL (ref 3.5–5.2)
ALP BLD-CCNC: 61 U/L (ref 40–129)
ALT SERPL-CCNC: 10 U/L (ref 0–40)
ANION GAP SERPL CALCULATED.3IONS-SCNC: 6 MMOL/L (ref 7–16)
APTT: 30.8 SEC (ref 24.5–35.1)
AST SERPL-CCNC: 11 U/L (ref 0–39)
B.E.: 16.5 MMOL/L (ref -3–3)
BACTERIA: NORMAL /HPF
BASOPHILS ABSOLUTE: 0.01 E9/L (ref 0–0.2)
BASOPHILS RELATIVE PERCENT: 0.1 % (ref 0–2)
BILIRUB SERPL-MCNC: 0.3 MG/DL (ref 0–1.2)
BILIRUBIN URINE: NEGATIVE
BLOOD, URINE: ABNORMAL
BUN BLDV-MCNC: 20 MG/DL (ref 8–23)
CALCIUM SERPL-MCNC: 7.5 MG/DL (ref 8.6–10.2)
CHLORIDE BLD-SCNC: 93 MMOL/L (ref 98–107)
CLARITY: CLEAR
CO2: 42 MMOL/L (ref 22–29)
COHB: 0.3 % (ref 0–1.5)
COLOR: YELLOW
CREAT SERPL-MCNC: 0.8 MG/DL (ref 0.7–1.2)
CRITICAL: ABNORMAL
DATE ANALYZED: ABNORMAL
DATE OF COLLECTION: ABNORMAL
EOSINOPHILS ABSOLUTE: 0 E9/L (ref 0.05–0.5)
EOSINOPHILS RELATIVE PERCENT: 0 % (ref 0–6)
FIO2: 100 %
GFR AFRICAN AMERICAN: >60
GFR NON-AFRICAN AMERICAN: >60 ML/MIN/1.73
GLUCOSE BLD-MCNC: 166 MG/DL (ref 74–99)
GLUCOSE URINE: NEGATIVE MG/DL
HCO3: 47.1 MMOL/L (ref 22–26)
HCT VFR BLD CALC: 37.7 % (ref 37–54)
HEMOGLOBIN: 11 G/DL (ref 12.5–16.5)
HHB: 0.2 % (ref 0–5)
IMMATURE GRANULOCYTES #: 0.11 E9/L
IMMATURE GRANULOCYTES %: 0.9 % (ref 0–5)
INR BLD: 1
KETONES, URINE: NEGATIVE MG/DL
LAB: ABNORMAL
LACTIC ACID, SEPSIS: 2.3 MMOL/L (ref 0.5–1.9)
LACTIC ACID, SEPSIS: 4.5 MMOL/L (ref 0.5–1.9)
LEUKOCYTE ESTERASE, URINE: NEGATIVE
LYMPHOCYTES ABSOLUTE: 0.78 E9/L (ref 1.5–4)
LYMPHOCYTES RELATIVE PERCENT: 6.4 % (ref 20–42)
Lab: ABNORMAL
MAGNESIUM: 2.5 MG/DL (ref 1.6–2.6)
MCH RBC QN AUTO: 30.8 PG (ref 26–35)
MCHC RBC AUTO-ENTMCNC: 29.2 % (ref 32–34.5)
MCV RBC AUTO: 105.6 FL (ref 80–99.9)
METHB: 0.4 % (ref 0–1.5)
MODE: AC
MONOCYTES ABSOLUTE: 0.55 E9/L (ref 0.1–0.95)
MONOCYTES RELATIVE PERCENT: 4.5 % (ref 2–12)
NEUTROPHILS ABSOLUTE: 10.7 E9/L (ref 1.8–7.3)
NEUTROPHILS RELATIVE PERCENT: 88.1 % (ref 43–80)
NITRITE, URINE: NEGATIVE
O2 CONTENT: 15.9 ML/DL
O2 SATURATION: 99.8 % (ref 92–98.5)
O2HB: 99.1 % (ref 94–97)
OPERATOR ID: ABNORMAL
PATIENT TEMP: 37 C
PCO2: 104.4 MMHG (ref 35–45)
PDW BLD-RTO: 12 FL (ref 11.5–15)
PEEP/CPAP: 5 CMH2O
PFO2: 4.68 MMHG/%
PH BLOOD GAS: 7.27 (ref 7.35–7.45)
PH UA: 6 (ref 5–9)
PLATELET # BLD: 367 E9/L (ref 130–450)
PMV BLD AUTO: 10 FL (ref 7–12)
PO2: 467.7 MMHG (ref 75–100)
POTASSIUM REFLEX MAGNESIUM: 4.1 MMOL/L (ref 3.5–5)
POTASSIUM SERPL-SCNC: 4.1 MMOL/L (ref 3.5–5)
PRO-BNP: 54 PG/ML (ref 0–125)
PROTEIN UA: 30 MG/DL
PROTHROMBIN TIME: 11.2 SEC (ref 9.3–12.4)
RBC # BLD: 3.57 E12/L (ref 3.8–5.8)
RBC UA: NORMAL /HPF (ref 0–2)
REASON FOR REJECTION: NORMAL
REJECTED TEST: NORMAL
RI(T): 25 %
RR MECHANICAL: 18 B/MIN
SARS-COV-2, NAAT: NOT DETECTED
SODIUM BLD-SCNC: 141 MMOL/L (ref 132–146)
SOURCE, BLOOD GAS: ABNORMAL
SPECIFIC GRAVITY UA: 1.02 (ref 1–1.03)
THB: 10.5 G/DL (ref 11.5–16.5)
TIME ANALYZED: 1948
TOTAL PROTEIN: 5.9 G/DL (ref 6.4–8.3)
TROPONIN: 0.02 NG/ML (ref 0–0.03)
UROBILINOGEN, URINE: 0.2 E.U./DL
VT MECHANICAL: 500 ML
WBC # BLD: 12.2 E9/L (ref 4.5–11.5)
WBC UA: NORMAL /HPF (ref 0–5)

## 2020-06-14 PROCEDURE — 80048 BASIC METABOLIC PNL TOTAL CA: CPT

## 2020-06-14 PROCEDURE — 6360000002 HC RX W HCPCS

## 2020-06-14 PROCEDURE — U0002 COVID-19 LAB TEST NON-CDC: HCPCS

## 2020-06-14 PROCEDURE — 83880 ASSAY OF NATRIURETIC PEPTIDE: CPT

## 2020-06-14 PROCEDURE — 94664 DEMO&/EVAL PT USE INHALER: CPT

## 2020-06-14 PROCEDURE — 93005 ELECTROCARDIOGRAM TRACING: CPT | Performed by: EMERGENCY MEDICINE

## 2020-06-14 PROCEDURE — 83605 ASSAY OF LACTIC ACID: CPT

## 2020-06-14 PROCEDURE — 2500000003 HC RX 250 WO HCPCS

## 2020-06-14 PROCEDURE — 85730 THROMBOPLASTIN TIME PARTIAL: CPT

## 2020-06-14 PROCEDURE — 6360000002 HC RX W HCPCS: Performed by: INTERNAL MEDICINE

## 2020-06-14 PROCEDURE — 80053 COMPREHEN METABOLIC PANEL: CPT

## 2020-06-14 PROCEDURE — 2580000003 HC RX 258: Performed by: EMERGENCY MEDICINE

## 2020-06-14 PROCEDURE — 94002 VENT MGMT INPAT INIT DAY: CPT

## 2020-06-14 PROCEDURE — 94640 AIRWAY INHALATION TREATMENT: CPT

## 2020-06-14 PROCEDURE — 82805 BLOOD GASES W/O2 SATURATION: CPT

## 2020-06-14 PROCEDURE — 6360000002 HC RX W HCPCS: Performed by: EMERGENCY MEDICINE

## 2020-06-14 PROCEDURE — 84100 ASSAY OF PHOSPHORUS: CPT

## 2020-06-14 PROCEDURE — 82553 CREATINE MB FRACTION: CPT

## 2020-06-14 PROCEDURE — 2500000003 HC RX 250 WO HCPCS: Performed by: EMERGENCY MEDICINE

## 2020-06-14 PROCEDURE — 86140 C-REACTIVE PROTEIN: CPT

## 2020-06-14 PROCEDURE — 36415 COLL VENOUS BLD VENIPUNCTURE: CPT

## 2020-06-14 PROCEDURE — 74018 RADEX ABDOMEN 1 VIEW: CPT

## 2020-06-14 PROCEDURE — 6370000000 HC RX 637 (ALT 250 FOR IP): Performed by: EMERGENCY MEDICINE

## 2020-06-14 PROCEDURE — 71045 X-RAY EXAM CHEST 1 VIEW: CPT

## 2020-06-14 PROCEDURE — 87040 BLOOD CULTURE FOR BACTERIA: CPT

## 2020-06-14 PROCEDURE — 85610 PROTHROMBIN TIME: CPT

## 2020-06-14 PROCEDURE — 83735 ASSAY OF MAGNESIUM: CPT

## 2020-06-14 PROCEDURE — 96365 THER/PROPH/DIAG IV INF INIT: CPT

## 2020-06-14 PROCEDURE — 82550 ASSAY OF CK (CPK): CPT

## 2020-06-14 PROCEDURE — 99284 EMERGENCY DEPT VISIT MOD MDM: CPT

## 2020-06-14 PROCEDURE — 84484 ASSAY OF TROPONIN QUANT: CPT

## 2020-06-14 PROCEDURE — 36592 COLLECT BLOOD FROM PICC: CPT

## 2020-06-14 PROCEDURE — 6370000000 HC RX 637 (ALT 250 FOR IP): Performed by: INTERNAL MEDICINE

## 2020-06-14 PROCEDURE — 96375 TX/PRO/DX INJ NEW DRUG ADDON: CPT

## 2020-06-14 PROCEDURE — 96366 THER/PROPH/DIAG IV INF ADDON: CPT

## 2020-06-14 PROCEDURE — 96376 TX/PRO/DX INJ SAME DRUG ADON: CPT

## 2020-06-14 PROCEDURE — 93005 ELECTROCARDIOGRAM TRACING: CPT | Performed by: INTERNAL MEDICINE

## 2020-06-14 PROCEDURE — 31500 INSERT EMERGENCY AIRWAY: CPT

## 2020-06-14 PROCEDURE — 2000000000 HC ICU R&B

## 2020-06-14 PROCEDURE — 85025 COMPLETE CBC W/AUTO DIFF WBC: CPT

## 2020-06-14 PROCEDURE — 84145 PROCALCITONIN (PCT): CPT

## 2020-06-14 PROCEDURE — 81001 URINALYSIS AUTO W/SCOPE: CPT

## 2020-06-14 RX ORDER — FENTANYL CITRATE 50 UG/ML
100 INJECTION, SOLUTION INTRAMUSCULAR; INTRAVENOUS ONCE
Status: COMPLETED | OUTPATIENT
Start: 2020-06-14 | End: 2020-06-14

## 2020-06-14 RX ORDER — MAGNESIUM SULFATE IN WATER 40 MG/ML
2 INJECTION, SOLUTION INTRAVENOUS ONCE
Status: COMPLETED | OUTPATIENT
Start: 2020-06-14 | End: 2020-06-14

## 2020-06-14 RX ORDER — FENTANYL CITRATE 50 UG/ML
INJECTION, SOLUTION INTRAMUSCULAR; INTRAVENOUS
Status: COMPLETED
Start: 2020-06-14 | End: 2020-06-14

## 2020-06-14 RX ORDER — MIDAZOLAM HYDROCHLORIDE 1 MG/ML
4 INJECTION INTRAMUSCULAR; INTRAVENOUS ONCE
Status: COMPLETED | OUTPATIENT
Start: 2020-06-14 | End: 2020-06-14

## 2020-06-14 RX ORDER — ROCURONIUM BROMIDE 10 MG/ML
INJECTION, SOLUTION INTRAVENOUS
Status: COMPLETED
Start: 2020-06-14 | End: 2020-06-14

## 2020-06-14 RX ORDER — FENTANYL CITRATE 50 UG/ML
100 INJECTION, SOLUTION INTRAMUSCULAR; INTRAVENOUS ONCE
Status: DISCONTINUED | OUTPATIENT
Start: 2020-06-14 | End: 2020-06-14 | Stop reason: HOSPADM

## 2020-06-14 RX ORDER — 0.9 % SODIUM CHLORIDE 0.9 %
1000 INTRAVENOUS SOLUTION INTRAVENOUS ONCE
Status: COMPLETED | OUTPATIENT
Start: 2020-06-14 | End: 2020-06-14

## 2020-06-14 RX ORDER — ROCURONIUM BROMIDE 10 MG/ML
100 INJECTION, SOLUTION INTRAVENOUS ONCE
Status: COMPLETED | OUTPATIENT
Start: 2020-06-14 | End: 2020-06-14

## 2020-06-14 RX ORDER — SODIUM CHLORIDE 0.9 % (FLUSH) 0.9 %
10 SYRINGE (ML) INJECTION EVERY 12 HOURS SCHEDULED
Status: DISCONTINUED | OUTPATIENT
Start: 2020-06-14 | End: 2020-06-14 | Stop reason: HOSPADM

## 2020-06-14 RX ORDER — IPRATROPIUM BROMIDE AND ALBUTEROL SULFATE 2.5; .5 MG/3ML; MG/3ML
3 SOLUTION RESPIRATORY (INHALATION) ONCE
Status: COMPLETED | OUTPATIENT
Start: 2020-06-14 | End: 2020-06-14

## 2020-06-14 RX ORDER — GLYCOPYRROLATE 0.2 MG/ML
0.2 INJECTION INTRAMUSCULAR; INTRAVENOUS ONCE
Status: COMPLETED | OUTPATIENT
Start: 2020-06-14 | End: 2020-06-14

## 2020-06-14 RX ORDER — PROPOFOL 10 MG/ML
10 INJECTION, EMULSION INTRAVENOUS
Status: DISCONTINUED | OUTPATIENT
Start: 2020-06-14 | End: 2020-06-14 | Stop reason: HOSPADM

## 2020-06-14 RX ORDER — KETAMINE HYDROCHLORIDE 10 MG/ML
100 INJECTION, SOLUTION INTRAMUSCULAR; INTRAVENOUS ONCE
Status: COMPLETED | OUTPATIENT
Start: 2020-06-14 | End: 2020-06-14

## 2020-06-14 RX ORDER — SODIUM CHLORIDE 0.9 % (FLUSH) 0.9 %
10 SYRINGE (ML) INJECTION PRN
Status: DISCONTINUED | OUTPATIENT
Start: 2020-06-14 | End: 2020-06-14 | Stop reason: HOSPADM

## 2020-06-14 RX ORDER — TERBUTALINE SULFATE 1 MG/ML
0.25 INJECTION, SOLUTION SUBCUTANEOUS ONCE
Status: DISCONTINUED | OUTPATIENT
Start: 2020-06-14 | End: 2020-06-14 | Stop reason: HOSPADM

## 2020-06-14 RX ORDER — PROPOFOL 10 MG/ML
INJECTION, EMULSION INTRAVENOUS
Status: COMPLETED
Start: 2020-06-14 | End: 2020-06-14

## 2020-06-14 RX ADMIN — GLYCOPYRROLATE 0.2 MG: 0.2 INJECTION, SOLUTION INTRAMUSCULAR; INTRAVENOUS at 19:36

## 2020-06-14 RX ADMIN — PROPOFOL INJECTABLE EMULSION 15 MCG/KG/MIN: 10 INJECTION, EMULSION INTRAVENOUS at 23:00

## 2020-06-14 RX ADMIN — ROCURONIUM BROMIDE 100 MG: 10 INJECTION, SOLUTION INTRAVENOUS at 18:54

## 2020-06-14 RX ADMIN — MIDAZOLAM 4 MG: 1 INJECTION INTRAMUSCULAR; INTRAVENOUS at 19:18

## 2020-06-14 RX ADMIN — IPRATROPIUM BROMIDE AND ALBUTEROL SULFATE 1 AMPULE: 2.5; .5 SOLUTION RESPIRATORY (INHALATION) at 23:30

## 2020-06-14 RX ADMIN — SODIUM CHLORIDE 1000 ML: 9 INJECTION, SOLUTION INTRAVENOUS at 20:08

## 2020-06-14 RX ADMIN — IPRATROPIUM BROMIDE AND ALBUTEROL SULFATE 3 AMPULE: .5; 3 SOLUTION RESPIRATORY (INHALATION) at 20:16

## 2020-06-14 RX ADMIN — PROPOFOL INJECTABLE EMULSION 25 MCG/KG/MIN: 10 INJECTION, EMULSION INTRAVENOUS at 00:42

## 2020-06-14 RX ADMIN — MIDAZOLAM 4 MG: 1 INJECTION INTRAMUSCULAR; INTRAVENOUS at 18:54

## 2020-06-14 RX ADMIN — PROPOFOL 10 MCG/KG/MIN: 10 INJECTION, EMULSION INTRAVENOUS at 18:55

## 2020-06-14 RX ADMIN — Medication 10 ML: at 19:38

## 2020-06-14 RX ADMIN — CEFTRIAXONE SODIUM 1 G: 1 INJECTION, POWDER, FOR SOLUTION INTRAMUSCULAR; INTRAVENOUS at 19:32

## 2020-06-14 RX ADMIN — MIDAZOLAM HYDROCHLORIDE 4 MG: 1 INJECTION, SOLUTION INTRAMUSCULAR; INTRAVENOUS at 19:19

## 2020-06-14 RX ADMIN — MIDAZOLAM 4 MG: 1 INJECTION INTRAMUSCULAR; INTRAVENOUS at 20:45

## 2020-06-14 RX ADMIN — FENTANYL CITRATE 100 MCG: 0.05 INJECTION, SOLUTION INTRAMUSCULAR; INTRAVENOUS at 19:37

## 2020-06-14 RX ADMIN — ROCURONIUM BROMIDE 100 MG: 50 INJECTION INTRAVENOUS at 18:54

## 2020-06-14 RX ADMIN — Medication 50 MCG/HR: at 19:57

## 2020-06-14 RX ADMIN — PROPOFOL INJECTABLE EMULSION 10 MCG/KG/MIN: 10 INJECTION, EMULSION INTRAVENOUS at 18:55

## 2020-06-14 RX ADMIN — FENTANYL CITRATE 100 MCG: 50 INJECTION, SOLUTION INTRAMUSCULAR; INTRAVENOUS at 19:18

## 2020-06-14 RX ADMIN — KETAMINE HYDROCHLORIDE 100 MG: 10 INJECTION, SOLUTION INTRAMUSCULAR; INTRAVENOUS at 18:53

## 2020-06-14 RX ADMIN — FENTANYL CITRATE 100 MCG: 0.05 INJECTION, SOLUTION INTRAMUSCULAR; INTRAVENOUS at 19:18

## 2020-06-14 RX ADMIN — SODIUM CHLORIDE 1000 ML: 9 INJECTION, SOLUTION INTRAVENOUS at 19:33

## 2020-06-14 RX ADMIN — MAGNESIUM SULFATE HEPTAHYDRATE 2 G: 40 INJECTION, SOLUTION INTRAVENOUS at 19:19

## 2020-06-14 ASSESSMENT — PAIN SCALES - GENERAL
PAINLEVEL_OUTOF10: 5
PAINLEVEL_OUTOF10: 0
PAINLEVEL_OUTOF10: 3

## 2020-06-14 ASSESSMENT — PULMONARY FUNCTION TESTS
PIF_VALUE: 43
PIF_VALUE: 23

## 2020-06-14 NOTE — ED NOTES
Intubated size 8 tube ,24 at the [id number] [place] [address], RN  06/14/20 3525 [facility] Homero Hogan RN  06/14/20 3420

## 2020-06-14 NOTE — ED NOTES
RSI  Versed 2mg  Ketamine 100  rocc 100mg     [place], [facility]  06/14/20 1833

## 2020-06-14 NOTE — ED PROVIDER NOTES
Somnolent, obtunded, in respiratory distress  Head: Normocephalic and atraumatic  Eyes: PERRL, EOMI, sclera non icteric  Mouth: Oropharynx clear, handling secretions, no trismus, no asymmetry of the posterior oropharynx or uvular edema  Neck: Supple, full ROM, no stridor, no meningeal signs bilateral JVD  Respiratory: Severely diminished throughout. In respiratory distress  Cardiovascular: TachyCardiac and regular equal extremity pulses. Chest: No chest wall tenderness  GI:  Abdomen Soft, Non tender, Non distended. No rebound, guarding, or rigidity. Musculoskeletal: Moves all extremities x 4. Skin: Diaphoretic. There is +3 edema bilateral lower extremities   Integument: Cool and diaphoretic  Neurologic: Glascow Coma Scale  Best Eye Response 2 - Opens eyes with pain   Best Verbal Response 2 - Moans, makes unintelligible sounds   Best Motor Response 4 - Moves part of body but does not remove noxious stimulus   Total 8            EKG:  Interpreted by emergency department physician, Dr. Ck New   This EKG is signed and interpreted by me. Rate: 106  Rhythm: Sinus  Interpretation: This rhythm, normal axis, MA is 124, QRS is 88, QTc is 451, no other acute findings stable compared to 2/12/2020  Comparison: stable as compared to patient's most recent EKG      -------------------------------------------------- RESULTS -------------------------------------------------  I have personally reviewed all laboratory and imaging results for this patient. Results are listed below.      LABS: (Lab results interpreted by me)  Results for orders placed or performed during the hospital encounter of 06/14/20   CBC Auto Differential   Result Value Ref Range    WBC 12.2 (H) 4.5 - 11.5 E9/L    RBC 3.57 (L) 3.80 - 5.80 E12/L    Hemoglobin 11.0 (L) 12.5 - 16.5 g/dL    Hematocrit 37.7 37.0 - 54.0 %    .6 (H) 80.0 - 99.9 fL    MCH 30.8 26.0 - 35.0 pg    MCHC 29.2 (L) 32.0 - 34.5 %    RDW 12.0 11.5 - 15.0 fL    Platelets 269 488 sodium chloride flush 0.9 % injection 10 mL (10 mLs Intravenous Given 6/14/20 1938)   sodium chloride flush 0.9 % injection 10 mL (has no administration in time range)   propofol injection (15 mcg/kg/min × 108.9 kg Intravenous Rate/Dose Change 6/14/20 2027)   terbutaline (BRETHINE) injection 0.25 mg (0.25 mg Subcutaneous Not Given 6/14/20 2009)   fentaNYL (SUBLIMAZE) injection 100 mcg (100 mcg Intravenous Not Given 6/14/20 1934)   fentaNYL 5 mcg/ml in 0.9%  ml infusion (50 mcg/hr Intravenous New Bag 6/14/20 1957)   norepinephrine (LEVOPHED) 16 mg in dextrose 5% 250 mL infusion (has no administration in time range)   midazolam (VERSED) injection 4 mg (4 mg Intravenous Given 6/14/20 1854)   ketamine (KETALAR) injection 100 mg (100 mg Intravenous Given 6/14/20 1853)   rocuronium (ZEMURON) injection 100 mg (100 mg Intravenous Given 6/14/20 1854)   cefTRIAXone (ROCEPHIN) 1 g in sterile water 10 mL IV syringe (1 g Intravenous New Bag 6/14/20 1932)   magnesium sulfate 2 g in 50 mL IVPB premix (0 g Intravenous Stopped 6/14/20 1933)   midazolam (VERSED) injection 4 mg (4 mg Intravenous Given 6/14/20 1919)   fentaNYL (SUBLIMAZE) injection 100 mcg (100 mcg Intravenous Given 6/14/20 1918)   midazolam (VERSED) injection 4 mg (4 mg Intravenous Not Given 6/14/20 1919)   0.9 % sodium chloride bolus (0 mLs Intravenous Stopped 6/14/20 1935)   glycopyrrolate (ROBINUL) injection 0.2 mg (0.2 mg Intravenous Given 6/14/20 1936)   fentaNYL (SUBLIMAZE) injection 100 mcg (100 mcg Intravenous Given 6/14/20 1937)   0.9 % sodium chloride bolus (0 mLs Intravenous Stopped 6/14/20 2026)   ipratropium-albuterol (DUONEB) nebulizer solution 3 ampule (3 ampules Inhalation Given 6/14/20 2016)   midazolam (VERSED) injection 4 mg (4 mg Intravenous Given 6/14/20 2045)                PROCEDURE  6/14/20       Time: On arrival - 1850    INTUBATION  Risks, benefits and alternatives if able (for applicable procedures below) described.    Performed By: respiratory distress but despite C-Pap. There is some question of fevers productive cough, concern for suspected novel coronavirus initially, decision made to take early airway control. A novel coronavirus was negative. Unfortunately an ABG was not initially ordered, or approximately 1 hour on the ventilator his PCO2 had decreased to 104. Leukocytosis and lactic acidosis noted, a dose of IV Rocephin was ordered. He is having high peak airway pressures, once coronavirus test was negative DuoNeb treatments were ordered. He is requiring mechanical ventilation. No intensive beds available at this facility, spoke with critical care at [facility], they will accept the patient to their intensive care unit    Re-Evaluations:       Time: 1900  Re-evaluation. Patients symptoms are improving  Repeat physical examination is improved -is improving on ventilator. Time: 2000  Re-evaluation. Patients symptoms show no change  Repeat physical examination is not changed     Sepsis Re-examination  6/14/20   8:45 PM EDT          Vital Signs:   Vitals:    06/14/20 2033 06/14/20 2034 06/14/20 2036 06/14/20 2041   BP: 98/69 98/69 97/67 112/75   Pulse: 83 83 84 92   Resp: 22 22 22 22   Temp:       TempSrc:       SpO2: 100% 100% 100%    Weight:         Card/Pulm:  Rhythm: normal rate. Heart Sounds: Normal S1, S2. rhonchi and wheezing. Capillary Refill: normal.  Radial Pulse:  present 2+. Skin:  Warm. This patient's ED course included: a personal history and physicial examination, multiple bedside re-evaluations, IV medications, cardiac monitoring, continuous pulse oximetry and complex medical decision making and emergency management    This patient has been closely monitored during their ED course. Consultations:  Spoke with Dr. Brian Sandy (Critical care @ [facility]). Discussed case. They will provide consultation. Spoke with Dr. Belia Spear (Medicine). Discussed case. They will admit this patient. Counseling: The emergency provider has spoken with the patient and discussed todays results, in addition to providing specific details for the plan of care and counseling regarding the diagnosis and prognosis. Questions are answered at this time and they are agreeable with the plan.       --------------------------------- IMPRESSION AND DISPOSITION ---------------------------------    IMPRESSION  1. Acute on chronic respiratory failure with hypoxia and hypercapnia (HCC)    2. [place] coma scale total score 3-8, at arrival to emergency department [facility])    3. Leukocytosis, unspecified type    4. Elevated lactic acid level    5. Suspected COVID-19 virus infection        DISPOSITION  Disposition: Transfer to [address] [place] [address] to ICU  Patient condition is serious        NOTE: This report was transcribed using voice recognition software. Every effort was made to ensure accuracy; however, inadvertent computerized transcription errors may be present    ATTENDING PROVIDER ATTESTATION:     Radha Rogers presented to the emergency department for evaluation of Respiratory Distress (77% on bipap)    I have reviewed and discussed the case, including pertinent history (medical, surgical, family and social) and exam findings with the Resident and the Nurse assigned to Radha Rogers. I have personally performed and/or participated in the history, exam, medical decision making, and procedures and agree with all pertinent clinical information. I have reviewed my findings and recommendations with Radha Rogers and members of family present at the time of disposition.         MDM:     I, Dr. Trent Escobar in the primary provider of record    Patient presents respiratory distress, there initial concern of novel coronavirus on the patient, he is hypoxic obtunded in severe distress, he was intubated upon arrival.  Novel coronavirus test was negative, duo nebs then ordered, leukocytosis lactic acidosis, IV antibiotics were initiated, no ICU

## 2020-06-15 ENCOUNTER — APPOINTMENT (OUTPATIENT)
Dept: CT IMAGING | Age: 60
DRG: 130 | End: 2020-06-15
Attending: INTERNAL MEDICINE
Payer: MEDICAID

## 2020-06-15 ENCOUNTER — APPOINTMENT (OUTPATIENT)
Dept: GENERAL RADIOLOGY | Age: 60
DRG: 130 | End: 2020-06-15
Attending: INTERNAL MEDICINE
Payer: MEDICAID

## 2020-06-15 PROBLEM — D53.9 MACROCYTIC ANEMIA: Status: ACTIVE | Noted: 2020-06-15

## 2020-06-15 PROBLEM — J96.21 ACUTE ON CHRONIC RESPIRATORY FAILURE WITH HYPOXIA AND HYPERCAPNIA (HCC): Status: ACTIVE | Noted: 2020-06-14

## 2020-06-15 PROBLEM — J96.22 ACUTE ON CHRONIC RESPIRATORY FAILURE WITH HYPOXIA AND HYPERCAPNIA (HCC): Status: ACTIVE | Noted: 2020-06-14

## 2020-06-15 LAB
AADO2: 123.7 MMHG
AADO2: 138.2 MMHG
ADENOVIRUS BY PCR: NOT DETECTED
ALBUMIN SERPL-MCNC: 3.6 G/DL (ref 3.5–5.2)
ALP BLD-CCNC: 61 U/L (ref 40–129)
ALT SERPL-CCNC: 10 U/L (ref 0–40)
ANION GAP SERPL CALCULATED.3IONS-SCNC: 5 MMOL/L (ref 7–16)
ANION GAP SERPL CALCULATED.3IONS-SCNC: 5 MMOL/L (ref 7–16)
AST SERPL-CCNC: 11 U/L (ref 0–39)
B.E.: 10.4 MMOL/L (ref -3–3)
B.E.: 10.8 MMOL/L (ref -3–3)
BASOPHILS ABSOLUTE: 0.01 E9/L (ref 0–0.2)
BASOPHILS RELATIVE PERCENT: 0.1 % (ref 0–2)
BILIRUB SERPL-MCNC: 0.3 MG/DL (ref 0–1.2)
BILIRUBIN DIRECT: <0.2 MG/DL (ref 0–0.3)
BILIRUBIN, INDIRECT: ABNORMAL MG/DL (ref 0–1)
BORDETELLA PARAPERTUSSIS BY PCR: NOT DETECTED
BORDETELLA PERTUSSIS BY PCR: NOT DETECTED
BUN BLDV-MCNC: 22 MG/DL (ref 8–23)
BUN BLDV-MCNC: 23 MG/DL (ref 8–23)
C-REACTIVE PROTEIN: <0.1 MG/DL (ref 0–0.4)
CALCIUM SERPL-MCNC: 8.4 MG/DL (ref 8.6–10.2)
CALCIUM SERPL-MCNC: 8.5 MG/DL (ref 8.6–10.2)
CHLAMYDOPHILIA PNEUMONIAE BY PCR: NOT DETECTED
CHLORIDE BLD-SCNC: 89 MMOL/L (ref 98–107)
CHLORIDE BLD-SCNC: 90 MMOL/L (ref 98–107)
CK MB: 9.1 NG/ML (ref 0–7.7)
CO2: 43 MMOL/L (ref 22–29)
CO2: 44 MMOL/L (ref 22–29)
COHB: 0.4 % (ref 0–1.5)
COHB: 0.7 % (ref 0–1.5)
CORONAVIRUS 229E BY PCR: NOT DETECTED
CORONAVIRUS HKU1 BY PCR: NOT DETECTED
CORONAVIRUS NL63 BY PCR: NOT DETECTED
CORONAVIRUS OC43 BY PCR: NOT DETECTED
CREAT SERPL-MCNC: 0.8 MG/DL (ref 0.7–1.2)
CREAT SERPL-MCNC: 0.8 MG/DL (ref 0.7–1.2)
CRITICAL: ABNORMAL
CRITICAL: ABNORMAL
DATE ANALYZED: ABNORMAL
DATE ANALYZED: ABNORMAL
DATE OF COLLECTION: ABNORMAL
DATE OF COLLECTION: ABNORMAL
EKG ATRIAL RATE: 106 BPM
EKG ATRIAL RATE: 93 BPM
EKG P AXIS: 79 DEGREES
EKG P AXIS: 84 DEGREES
EKG P-R INTERVAL: 118 MS
EKG P-R INTERVAL: 124 MS
EKG Q-T INTERVAL: 340 MS
EKG Q-T INTERVAL: 344 MS
EKG QRS DURATION: 86 MS
EKG QRS DURATION: 88 MS
EKG QTC CALCULATION (BAZETT): 427 MS
EKG QTC CALCULATION (BAZETT): 451 MS
EKG R AXIS: 62 DEGREES
EKG R AXIS: 74 DEGREES
EKG T AXIS: 57 DEGREES
EKG T AXIS: 73 DEGREES
EKG VENTRICULAR RATE: 106 BPM
EKG VENTRICULAR RATE: 93 BPM
EOSINOPHILS ABSOLUTE: 0 E9/L (ref 0.05–0.5)
EOSINOPHILS RELATIVE PERCENT: 0 % (ref 0–6)
FIO2: 40 %
FIO2: 40 %
FOLATE: 9.8 NG/ML (ref 4.8–24.2)
GFR AFRICAN AMERICAN: >60
GFR AFRICAN AMERICAN: >60
GFR NON-AFRICAN AMERICAN: >60 ML/MIN/1.73
GFR NON-AFRICAN AMERICAN: >60 ML/MIN/1.73
GLUCOSE BLD-MCNC: 131 MG/DL (ref 74–99)
GLUCOSE BLD-MCNC: 147 MG/DL (ref 74–99)
HCO3: 36.4 MMOL/L (ref 22–26)
HCO3: 37.6 MMOL/L (ref 22–26)
HCT VFR BLD CALC: 30.9 % (ref 37–54)
HEMOGLOBIN: 9.3 G/DL (ref 12.5–16.5)
HHB: 3.5 % (ref 0–5)
HHB: 4.7 % (ref 0–5)
HUMAN METAPNEUMOVIRUS BY PCR: NOT DETECTED
HUMAN RHINOVIRUS/ENTEROVIRUS BY PCR: NOT DETECTED
IMMATURE GRANULOCYTES #: 0.07 E9/L
IMMATURE GRANULOCYTES %: 0.5 % (ref 0–5)
INFLUENZA A BY PCR: NOT DETECTED
INFLUENZA B BY PCR: NOT DETECTED
LAB: ABNORMAL
LAB: ABNORMAL
LACTIC ACID: 2.1 MMOL/L (ref 0.5–2.2)
LACTIC ACID: 3 MMOL/L (ref 0.5–2.2)
LYMPHOCYTES ABSOLUTE: 0.33 E9/L (ref 1.5–4)
LYMPHOCYTES RELATIVE PERCENT: 2.4 % (ref 20–42)
Lab: ABNORMAL
Lab: ABNORMAL
MAGNESIUM: 2.4 MG/DL (ref 1.6–2.6)
MAGNESIUM: 2.6 MG/DL (ref 1.6–2.6)
MCH RBC QN AUTO: 30.8 PG (ref 26–35)
MCHC RBC AUTO-ENTMCNC: 30.1 % (ref 32–34.5)
MCV RBC AUTO: 102.3 FL (ref 80–99.9)
METHB: 0.2 % (ref 0–1.5)
METHB: 0.2 % (ref 0–1.5)
MODE: AC
MODE: AC
MONOCYTES ABSOLUTE: 0.41 E9/L (ref 0.1–0.95)
MONOCYTES RELATIVE PERCENT: 3 % (ref 2–12)
MYCOPLASMA PNEUMONIAE BY PCR: NOT DETECTED
NEUTROPHILS ABSOLUTE: 13.01 E9/L (ref 1.8–7.3)
NEUTROPHILS RELATIVE PERCENT: 94 % (ref 43–80)
O2 CONTENT: 13.5 ML/DL
O2 CONTENT: 15.4 ML/DL
O2 SATURATION: 95.3 % (ref 92–98.5)
O2 SATURATION: 96.5 % (ref 92–98.5)
O2HB: 94.7 % (ref 94–97)
O2HB: 95.6 % (ref 94–97)
OPERATOR ID: 420
OPERATOR ID: ABNORMAL
PARAINFLUENZA VIRUS 1 BY PCR: NOT DETECTED
PARAINFLUENZA VIRUS 2 BY PCR: NOT DETECTED
PARAINFLUENZA VIRUS 3 BY PCR: NOT DETECTED
PARAINFLUENZA VIRUS 4 BY PCR: NOT DETECTED
PATIENT TEMP: 37 C
PATIENT TEMP: 37 C
PCO2: 53.5 MMHG (ref 35–45)
PCO2: 66.4 MMHG (ref 35–45)
PDW BLD-RTO: 12.5 FL (ref 11.5–15)
PEEP/CPAP: 5 CMH2O
PEEP/CPAP: 5 CMH2O
PFO2: 1.88 MMHG/%
PFO2: 1.89 MMHG/%
PH BLOOD GAS: 7.37 (ref 7.35–7.45)
PH BLOOD GAS: 7.45 (ref 7.35–7.45)
PHOSPHORUS: 1.6 MG/DL (ref 2.5–4.5)
PHOSPHORUS: 2 MG/DL (ref 2.5–4.5)
PLATELET # BLD: 265 E9/L (ref 130–450)
PMV BLD AUTO: 10.1 FL (ref 7–12)
PO2: 75.1 MMHG (ref 75–100)
PO2: 75.5 MMHG (ref 75–100)
POTASSIUM REFLEX MAGNESIUM: 4.3 MMOL/L (ref 3.5–5)
POTASSIUM SERPL-SCNC: 4.3 MMOL/L (ref 3.5–5)
POTASSIUM SERPL-SCNC: 5 MMOL/L (ref 3.5–5)
PRO-BNP: 59 PG/ML (ref 0–125)
PROCALCITONIN: 0.06 NG/ML (ref 0–0.08)
RBC # BLD: 3.02 E12/L (ref 3.8–5.8)
RBC # BLD: NORMAL 10*6/UL
RESPIRATORY SYNCYTIAL VIRUS BY PCR: NOT DETECTED
RI(T): 165 %
RI(T): 183 %
RR MECHANICAL: 16 B/MIN
RR MECHANICAL: 22 B/MIN
SODIUM BLD-SCNC: 138 MMOL/L (ref 132–146)
SODIUM BLD-SCNC: 138 MMOL/L (ref 132–146)
SOURCE, BLOOD GAS: ABNORMAL
SOURCE, BLOOD GAS: ABNORMAL
THB: 10.1 G/DL (ref 11.5–16.5)
THB: 11.4 G/DL (ref 11.5–16.5)
TIME ANALYZED: 2
TIME ANALYZED: 603
TOTAL CK: 151 U/L (ref 20–200)
TOTAL PROTEIN: 6.2 G/DL (ref 6.4–8.3)
TROPONIN: <0.01 NG/ML (ref 0–0.03)
VITAMIN B-12: 239 PG/ML (ref 211–946)
VT MECHANICAL: 500 ML
VT MECHANICAL: 500 ML
WBC # BLD: 13.8 E9/L (ref 4.5–11.5)

## 2020-06-15 PROCEDURE — 6360000002 HC RX W HCPCS: Performed by: INTERNAL MEDICINE

## 2020-06-15 PROCEDURE — 82607 VITAMIN B-12: CPT

## 2020-06-15 PROCEDURE — 2580000003 HC RX 258: Performed by: INTERNAL MEDICINE

## 2020-06-15 PROCEDURE — 6370000000 HC RX 637 (ALT 250 FOR IP): Performed by: INTERNAL MEDICINE

## 2020-06-15 PROCEDURE — 36592 COLLECT BLOOD FROM PICC: CPT

## 2020-06-15 PROCEDURE — 82805 BLOOD GASES W/O2 SATURATION: CPT

## 2020-06-15 PROCEDURE — 94003 VENT MGMT INPAT SUBQ DAY: CPT

## 2020-06-15 PROCEDURE — 94640 AIRWAY INHALATION TREATMENT: CPT

## 2020-06-15 PROCEDURE — 87081 CULTURE SCREEN ONLY: CPT

## 2020-06-15 PROCEDURE — 2500000003 HC RX 250 WO HCPCS: Performed by: INTERNAL MEDICINE

## 2020-06-15 PROCEDURE — C9113 INJ PANTOPRAZOLE SODIUM, VIA: HCPCS | Performed by: INTERNAL MEDICINE

## 2020-06-15 PROCEDURE — 82746 ASSAY OF FOLIC ACID SERUM: CPT

## 2020-06-15 PROCEDURE — 5A1955Z RESPIRATORY VENTILATION, GREATER THAN 96 CONSECUTIVE HOURS: ICD-10-PCS | Performed by: INTERNAL MEDICINE

## 2020-06-15 PROCEDURE — 93010 ELECTROCARDIOGRAM REPORT: CPT | Performed by: INTERNAL MEDICINE

## 2020-06-15 PROCEDURE — 80076 HEPATIC FUNCTION PANEL: CPT

## 2020-06-15 PROCEDURE — 99223 1ST HOSP IP/OBS HIGH 75: CPT | Performed by: INTERNAL MEDICINE

## 2020-06-15 PROCEDURE — 84100 ASSAY OF PHOSPHORUS: CPT

## 2020-06-15 PROCEDURE — 85025 COMPLETE CBC W/AUTO DIFF WBC: CPT

## 2020-06-15 PROCEDURE — 80048 BASIC METABOLIC PNL TOTAL CA: CPT

## 2020-06-15 PROCEDURE — 0100U HC RESPIRPTHGN MULT REV TRANS & AMP PRB TECH 21 TRGT: CPT

## 2020-06-15 PROCEDURE — 71045 X-RAY EXAM CHEST 1 VIEW: CPT

## 2020-06-15 PROCEDURE — 74176 CT ABD & PELVIS W/O CONTRAST: CPT

## 2020-06-15 PROCEDURE — 83735 ASSAY OF MAGNESIUM: CPT

## 2020-06-15 PROCEDURE — 36415 COLL VENOUS BLD VENIPUNCTURE: CPT

## 2020-06-15 PROCEDURE — 74150 CT ABDOMEN W/O CONTRAST: CPT

## 2020-06-15 PROCEDURE — 87206 SMEAR FLUORESCENT/ACID STAI: CPT

## 2020-06-15 PROCEDURE — 36600 WITHDRAWAL OF ARTERIAL BLOOD: CPT

## 2020-06-15 PROCEDURE — 71250 CT THORAX DX C-: CPT

## 2020-06-15 PROCEDURE — 2000000000 HC ICU R&B

## 2020-06-15 PROCEDURE — 83605 ASSAY OF LACTIC ACID: CPT

## 2020-06-15 PROCEDURE — 0BH17EZ INSERTION OF ENDOTRACHEAL AIRWAY INTO TRACHEA, VIA NATURAL OR ARTIFICIAL OPENING: ICD-10-PCS | Performed by: INTERNAL MEDICINE

## 2020-06-15 PROCEDURE — 87070 CULTURE OTHR SPECIMN AEROBIC: CPT

## 2020-06-15 RX ORDER — PROPOFOL 10 MG/ML
10 INJECTION, EMULSION INTRAVENOUS
Status: DISCONTINUED | OUTPATIENT
Start: 2020-06-15 | End: 2020-06-15 | Stop reason: DRUGHIGH

## 2020-06-15 RX ORDER — SODIUM CHLORIDE 9 MG/ML
INJECTION, SOLUTION INTRAVENOUS CONTINUOUS
Status: ACTIVE | OUTPATIENT
Start: 2020-06-15 | End: 2020-06-15

## 2020-06-15 RX ORDER — ALBUTEROL SULFATE 2.5 MG/3ML
2.5 SOLUTION RESPIRATORY (INHALATION) EVERY 6 HOURS PRN
Status: DISCONTINUED | OUTPATIENT
Start: 2020-06-15 | End: 2020-06-18

## 2020-06-15 RX ORDER — SODIUM CHLORIDE 0.9 % (FLUSH) 0.9 %
10 SYRINGE (ML) INJECTION PRN
Status: DISCONTINUED | OUTPATIENT
Start: 2020-06-15 | End: 2020-06-22

## 2020-06-15 RX ORDER — ONDANSETRON 2 MG/ML
4 INJECTION INTRAMUSCULAR; INTRAVENOUS EVERY 6 HOURS PRN
Status: DISCONTINUED | OUTPATIENT
Start: 2020-06-15 | End: 2020-06-25 | Stop reason: HOSPADM

## 2020-06-15 RX ORDER — CHLORHEXIDINE GLUCONATE 0.12 MG/ML
15 RINSE ORAL 2 TIMES DAILY
Status: DISCONTINUED | OUTPATIENT
Start: 2020-06-15 | End: 2020-06-22

## 2020-06-15 RX ORDER — BUDESONIDE 0.5 MG/2ML
0.5 INHALANT ORAL 2 TIMES DAILY
Status: DISCONTINUED | OUTPATIENT
Start: 2020-06-15 | End: 2020-06-25 | Stop reason: HOSPADM

## 2020-06-15 RX ORDER — METHYLPREDNISOLONE SODIUM SUCCINATE 40 MG/ML
40 INJECTION, POWDER, LYOPHILIZED, FOR SOLUTION INTRAMUSCULAR; INTRAVENOUS EVERY 8 HOURS
Status: COMPLETED | OUTPATIENT
Start: 2020-06-15 | End: 2020-06-16

## 2020-06-15 RX ORDER — MAGNESIUM SULFATE 1 G/100ML
1 INJECTION INTRAVENOUS PRN
Status: DISCONTINUED | OUTPATIENT
Start: 2020-06-15 | End: 2020-06-23

## 2020-06-15 RX ORDER — PROPOFOL 10 MG/ML
10 INJECTION, EMULSION INTRAVENOUS
Status: DISCONTINUED | OUTPATIENT
Start: 2020-06-15 | End: 2020-06-19

## 2020-06-15 RX ORDER — ACETAMINOPHEN 650 MG/1
650 SUPPOSITORY RECTAL EVERY 6 HOURS PRN
Status: DISCONTINUED | OUTPATIENT
Start: 2020-06-15 | End: 2020-06-25 | Stop reason: HOSPADM

## 2020-06-15 RX ORDER — METHYLPREDNISOLONE SODIUM SUCCINATE 40 MG/ML
40 INJECTION, POWDER, LYOPHILIZED, FOR SOLUTION INTRAMUSCULAR; INTRAVENOUS DAILY
Status: DISCONTINUED | OUTPATIENT
Start: 2020-06-15 | End: 2020-06-15

## 2020-06-15 RX ORDER — BUDESONIDE AND FORMOTEROL FUMARATE DIHYDRATE 160; 4.5 UG/1; UG/1
2 AEROSOL RESPIRATORY (INHALATION) 2 TIMES DAILY
Status: DISCONTINUED | OUTPATIENT
Start: 2020-06-15 | End: 2020-06-15

## 2020-06-15 RX ORDER — PROMETHAZINE HYDROCHLORIDE 25 MG/1
12.5 TABLET ORAL EVERY 6 HOURS PRN
Status: DISCONTINUED | OUTPATIENT
Start: 2020-06-15 | End: 2020-06-25 | Stop reason: HOSPADM

## 2020-06-15 RX ORDER — SODIUM CHLORIDE 9 MG/ML
10 INJECTION INTRAVENOUS 2 TIMES DAILY
Status: DISCONTINUED | OUTPATIENT
Start: 2020-06-15 | End: 2020-06-17

## 2020-06-15 RX ORDER — SODIUM CHLORIDE 9 MG/ML
10 INJECTION INTRAVENOUS DAILY
Status: DISCONTINUED | OUTPATIENT
Start: 2020-06-15 | End: 2020-06-22

## 2020-06-15 RX ORDER — PANTOPRAZOLE SODIUM 40 MG/10ML
40 INJECTION, POWDER, LYOPHILIZED, FOR SOLUTION INTRAVENOUS DAILY
Status: DISCONTINUED | OUTPATIENT
Start: 2020-06-15 | End: 2020-06-22

## 2020-06-15 RX ORDER — IPRATROPIUM BROMIDE AND ALBUTEROL SULFATE 2.5; .5 MG/3ML; MG/3ML
1 SOLUTION RESPIRATORY (INHALATION) 4 TIMES DAILY
Status: DISCONTINUED | OUTPATIENT
Start: 2020-06-15 | End: 2020-06-15 | Stop reason: CLARIF

## 2020-06-15 RX ORDER — IPRATROPIUM BROMIDE AND ALBUTEROL SULFATE 2.5; .5 MG/3ML; MG/3ML
1 SOLUTION RESPIRATORY (INHALATION) EVERY 4 HOURS PRN
Status: DISCONTINUED | OUTPATIENT
Start: 2020-06-15 | End: 2020-06-18

## 2020-06-15 RX ORDER — POTASSIUM CHLORIDE 7.45 MG/ML
10 INJECTION INTRAVENOUS PRN
Status: DISCONTINUED | OUTPATIENT
Start: 2020-06-15 | End: 2020-06-23

## 2020-06-15 RX ORDER — ACETAMINOPHEN 325 MG/1
650 TABLET ORAL EVERY 6 HOURS PRN
Status: DISCONTINUED | OUTPATIENT
Start: 2020-06-15 | End: 2020-06-25 | Stop reason: HOSPADM

## 2020-06-15 RX ORDER — POTASSIUM CHLORIDE 20 MEQ/1
40 TABLET, EXTENDED RELEASE ORAL PRN
Status: DISCONTINUED | OUTPATIENT
Start: 2020-06-15 | End: 2020-06-23

## 2020-06-15 RX ORDER — ARFORMOTEROL TARTRATE 15 UG/2ML
15 SOLUTION RESPIRATORY (INHALATION) 2 TIMES DAILY
Status: DISCONTINUED | OUTPATIENT
Start: 2020-06-15 | End: 2020-06-25 | Stop reason: HOSPADM

## 2020-06-15 RX ORDER — SODIUM CHLORIDE 0.9 % (FLUSH) 0.9 %
10 SYRINGE (ML) INJECTION EVERY 12 HOURS SCHEDULED
Status: DISCONTINUED | OUTPATIENT
Start: 2020-06-15 | End: 2020-06-25 | Stop reason: HOSPADM

## 2020-06-15 RX ADMIN — SODIUM CHLORIDE, PRESERVATIVE FREE 10 ML: 5 INJECTION INTRAVENOUS at 09:33

## 2020-06-15 RX ADMIN — PANTOPRAZOLE SODIUM 40 MG: 40 INJECTION, POWDER, FOR SOLUTION INTRAVENOUS at 09:33

## 2020-06-15 RX ADMIN — ENOXAPARIN SODIUM 40 MG: 40 INJECTION SUBCUTANEOUS at 09:32

## 2020-06-15 RX ADMIN — METHYLPREDNISOLONE SODIUM SUCCINATE 40 MG: 40 INJECTION, POWDER, LYOPHILIZED, FOR SOLUTION INTRAMUSCULAR; INTRAVENOUS at 01:31

## 2020-06-15 RX ADMIN — ARFORMOTEROL TARTRATE 15 MCG: 15 SOLUTION RESPIRATORY (INHALATION) at 08:46

## 2020-06-15 RX ADMIN — NOREPINEPHRINE BITARTRATE 5 MCG/MIN: 1 INJECTION, SOLUTION, CONCENTRATE INTRAVENOUS at 09:24

## 2020-06-15 RX ADMIN — Medication 10 ML: at 09:34

## 2020-06-15 RX ADMIN — Medication 100 MCG/HR: at 23:52

## 2020-06-15 RX ADMIN — PROPOFOL INJECTABLE EMULSION 25 MCG/KG/MIN: 10 INJECTION, EMULSION INTRAVENOUS at 17:06

## 2020-06-15 RX ADMIN — METHYLPREDNISOLONE SODIUM SUCCINATE 40 MG: 40 INJECTION, POWDER, LYOPHILIZED, FOR SOLUTION INTRAMUSCULAR; INTRAVENOUS at 09:33

## 2020-06-15 RX ADMIN — Medication 100 MCG/HR: at 15:38

## 2020-06-15 RX ADMIN — SODIUM PHOSPHATE, MONOBASIC, MONOHYDRATE 30 MMOL: 276; 142 INJECTION, SOLUTION INTRAVENOUS at 12:32

## 2020-06-15 RX ADMIN — IPRATROPIUM BROMIDE AND ALBUTEROL SULFATE 1 AMPULE: 2.5; .5 SOLUTION RESPIRATORY (INHALATION) at 19:45

## 2020-06-15 RX ADMIN — SODIUM PHOSPHATE, MONOBASIC, MONOHYDRATE 20 MMOL: 276; 142 INJECTION, SOLUTION INTRAVENOUS at 01:18

## 2020-06-15 RX ADMIN — CHLORHEXIDINE GLUCONATE 0.12% ORAL RINSE 15 ML: 1.2 LIQUID ORAL at 21:00

## 2020-06-15 RX ADMIN — SODIUM CHLORIDE: 9 INJECTION, SOLUTION INTRAVENOUS at 10:58

## 2020-06-15 RX ADMIN — Medication 100 MCG/HR: at 20:06

## 2020-06-15 RX ADMIN — ALBUTEROL SULFATE 2.5 MG: 2.5 SOLUTION RESPIRATORY (INHALATION) at 08:46

## 2020-06-15 RX ADMIN — CHLORHEXIDINE GLUCONATE 0.12% ORAL RINSE 15 ML: 1.2 LIQUID ORAL at 09:32

## 2020-06-15 RX ADMIN — PROPOFOL INJECTABLE EMULSION 25 MCG/KG/MIN: 10 INJECTION, EMULSION INTRAVENOUS at 23:52

## 2020-06-15 RX ADMIN — IPRATROPIUM BROMIDE AND ALBUTEROL SULFATE 1 AMPULE: 2.5; .5 SOLUTION RESPIRATORY (INHALATION) at 23:47

## 2020-06-15 RX ADMIN — BUDESONIDE 500 MCG: 0.5 SUSPENSION RESPIRATORY (INHALATION) at 08:46

## 2020-06-15 RX ADMIN — METHYLPREDNISOLONE SODIUM SUCCINATE 40 MG: 40 INJECTION, POWDER, LYOPHILIZED, FOR SOLUTION INTRAMUSCULAR; INTRAVENOUS at 18:17

## 2020-06-15 RX ADMIN — ARFORMOTEROL TARTRATE 15 MCG: 15 SOLUTION RESPIRATORY (INHALATION) at 19:29

## 2020-06-15 RX ADMIN — IPRATROPIUM BROMIDE AND ALBUTEROL SULFATE 1 AMPULE: 2.5; .5 SOLUTION RESPIRATORY (INHALATION) at 12:54

## 2020-06-15 RX ADMIN — IPRATROPIUM BROMIDE AND ALBUTEROL SULFATE 1 AMPULE: 2.5; .5 SOLUTION RESPIRATORY (INHALATION) at 16:21

## 2020-06-15 RX ADMIN — IPRATROPIUM BROMIDE AND ALBUTEROL SULFATE 1 AMPULE: 2.5; .5 SOLUTION RESPIRATORY (INHALATION) at 03:31

## 2020-06-15 RX ADMIN — SODIUM CHLORIDE: 9 INJECTION, SOLUTION INTRAVENOUS at 00:42

## 2020-06-15 RX ADMIN — PROPOFOL INJECTABLE EMULSION 25 MCG/KG/MIN: 10 INJECTION, EMULSION INTRAVENOUS at 20:06

## 2020-06-15 RX ADMIN — PROPOFOL INJECTABLE EMULSION 25 MCG/KG/MIN: 10 INJECTION, EMULSION INTRAVENOUS at 11:27

## 2020-06-15 RX ADMIN — BUDESONIDE 500 MCG: 0.5 SUSPENSION RESPIRATORY (INHALATION) at 19:29

## 2020-06-15 ASSESSMENT — PULMONARY FUNCTION TESTS
PIF_VALUE: 50
PIF_VALUE: 47
PIF_VALUE: 50
PIF_VALUE: 41
PIF_VALUE: 47
PIF_VALUE: 45
PIF_VALUE: 45
PIF_VALUE: 42
PIF_VALUE: 53
PIF_VALUE: 60
PIF_VALUE: 44
PIF_VALUE: 48
PIF_VALUE: 47
PIF_VALUE: 54
PIF_VALUE: 40
PIF_VALUE: 39
PIF_VALUE: 41

## 2020-06-15 ASSESSMENT — PAIN SCALES - GENERAL
PAINLEVEL_OUTOF10: 0
PAINLEVEL_OUTOF10: 0

## 2020-06-15 NOTE — CARE COORDINATION
COVID negative 6/14. Vent/ intubated since 6/14. On iv pressor. Discharge plan unknown at present pending progress.  WIll follow Valerie Obrien

## 2020-06-15 NOTE — CONSULTS
mcg/hr (06/15/20 1003)    propofol 25 mcg/kg/min (06/15/20 1003)    norepinephrine 4 mcg/min (06/15/20 1004)     Home Medications  Medications Prior to Admission: budesonide-formoterol (SYMBICORT) 160-4.5 MCG/ACT AERO, Inhale 2 puffs into the lungs 2 times daily  varenicline (CHANTIX) 1 MG tablet, TAKE 1 TABLET BY MOUTH TWICE DAILY  predniSONE (DELTASONE) 10 MG tablet, 4 tabs x 2 days, then 3 tabs x 2 days, then 2 tabs x 2 days, then 1 tab x 2 days. tiotropium (SPIRIVA HANDIHALER) 18 MCG inhalation capsule, Inhale 1 capsule into the lungs daily  lisinopril-hydrochlorothiazide (PRINZIDE;ZESTORETIC) 20-25 MG per tablet, Take 1 tablet by mouth daily  albuterol sulfate HFA (VENTOLIN HFA) 108 (90 Base) MCG/ACT inhaler, INHALE 2 PUFFS BY MOUTH INTO THE LUNGS EVERY 4 HOURS AS NEEDED FOR WHEEZING  ipratropium-albuterol (DUONEB) 0.5-2.5 (3) MG/3ML SOLN nebulizer solution,     Diet/Nutrition   Diet NPO Effective Now    Allergies   Iv dye [iodides]    Social History   Tobacco   reports that he has been smoking. He has a 20.00 pack-year smoking history. He has never used smokeless tobacco.    Alcohol     reports no history of alcohol use. Occupational history :    Family History   No family history on file.     Sleep History   n/a    ROS     REVIEW OF SYSTEMS:  Review of systems not obtained due to patient factors - intubation    Lines and Devices    Left Fem Central    Mechanical Ventilation Data   VENT SETTINGS (Comprehensive)  Vent Information  $Ventilation: $Initial Day  Vent Type: 840  Vent Mode: AC/VC  Vt Ordered: 500 mL  Rate Set: 16 bmp  Peak Flow: 80 L/min  Pressure Support: 0 cmH20  FiO2 : 40 %  SpO2: 100 %  SpO2/FiO2 ratio: 250  Sensitivity: 2  PEEP/CPAP: 5  I Time/ I Time %: 0 s  Additional Respiratory  Assessments  Pulse: 93  Resp: 15  SpO2: 100 %  Oral Care: Mouth swabbed, Mouth moisturizer, Mouth suctioned, Suction toothette  Subglottic Suction Done?: Yes  Cuff Pressure (cm H2O): 29 cm H2O    ABG  Lab Results   Component Value Date    PH 7.371 06/15/2020    PCO2 66.4 06/15/2020    PO2 75.1 06/15/2020    HCO3 37.6 06/15/2020    O2SAT 95.3 06/15/2020     Lab Results   Component Value Date    MODE AC 06/15/2020           Vitals    height is 6' 2\" (1.88 m) and weight is 235 lb 14.3 oz (107 kg). His axillary temperature is 99 °F (37.2 °C). His blood pressure is 154/64 (abnormal) and his pulse is 93. His respiration is 15 and oxygen saturation is 100%. Temperature Range: Temp: 99 °F (37.2 °C) Temp  Av.3 °F (36.8 °C)  Min: 97.2 °F (36.2 °C)  Max: 99.1 °F (37.3 °C)  BP Range:  Systolic (23JJW), FRD:86 , Min:61 , QZS:084     Diastolic (90LTS), QWB:14, Min:44, Max:125    Pulse Range: Pulse  Av.9  Min: 78  Max: 122  Respiration Range: Resp  Av.7  Min: 0  Max: 38  Current Pulse Ox[de-identified]  SpO2: 100 %  24HR Pulse Ox Range:  SpO2  Av.1 %  Min: 61 %  Max: 100 %  Oxygen Amount and Delivery:        I/O (24 Hours)    Patient Vitals for the past 8 hrs:   BP Temp Temp src Pulse Resp SpO2   06/15/20 0900 (!) 154/64 -- -- 93 15 100 %   06/15/20 0843 -- -- -- 95 15 100 %   06/15/20 0836 -- -- -- 98 16 100 %   06/15/20 0800 (!) 128/93 99 °F (37.2 °C) Axillary 83 (!) 0 98 %   06/15/20 0700 (!) 84/51 -- -- 81 16 99 %   06/15/20 0600 (!) 97/59 -- -- 87 16 98 %   06/15/20 0500 (!) 87/49 -- -- 89 16 97 %   06/15/20 0400 (!) 102/56 99.1 °F (37.3 °C) Axillary 94 16 100 %   06/15/20 0351 -- -- -- 98 16 100 %   06/15/20 0330 -- -- -- 95 23 98 %   06/15/20 0300 (!) /58 -- -- 90 -- 100 %       Intake/Output Summary (Last 24 hours) at 6/15/2020 1042  Last data filed at 6/15/2020 0600  Gross per 24 hour   Intake 1070 ml   Output 590 ml   Net 480 ml     I/O last 3 completed shifts:   In: 7513 [I.V.:1070]  Out: 0 [Urine:590]   Date 06/15/20 0000 - 06/15/20 2359   Shift 8167-4596 2766-7646 8888-7129 24 Hour Total   INTAKE   I.V.(mL/kg) 1070(10)   1070(10)   Shift Total(mL/kg) 1070(10)   1070(10)   OUTPUT   Urine(mL/kg/hr) q8h    Cardiovascular   Hypotension requiring vaspopressors  Levophed - Titrate to MAP greater than 65  Wean as tolerated  History of hypertension - will restart meds when clinically able  Lovenox for DVT ppx    Gastrointestinal   Protonix for vent GI ppx  Lactic acidosis - improving, continue to trend    Renal   Renal function wnl  Continue to monitor with BMP  Parra in place      Infectious Disease   No signs of current infection  Resp culture sent  Resp molecular panel negative  WBC count mildly elevated, likely secondary to steroids and intubation stress    - 13.8 6/15   - Continue to trend daily    Hematology/Oncology   Hgb 9.3/Hct 30.9 - baseline appears to be in the 10's  No signs of active bleeding  Monitor closely     Endocrine   No known history of diabetes  Monitor blood sugar closely, on steroids   -131 6/15  Continue to monitor    Social/Spiritual/DNR/Other   Full    Ashu Duffy MD  Resident, PGY-1  6/15/2020  10:42 AM    Critical Care Attending Addendum:    Patient seen and examined with the house staff. X-rays personally reviewed through the PACS. Family is updated at the bedside as available. Additional findings listed below as necessary. Additional comments:  1. Acute hypoxic and hypercapnic respiratory failure  2. COPD exacerbation  3. Hypotension likely due to propofol  4. Mild lactic acidosis follow  5. Will wean propofol then try to get off norepi. 6. Keep on fentanyl and full ventilatory support today.     30 min cct

## 2020-06-15 NOTE — H&P
06/14/2020    LABALBU 4.1 02/13/2020    LABALBU 4.2 04/10/2018     Lactic acid   Recent Labs     06/14/20  1858 06/14/20 2020   LACTSEPSIS 2.3* 4.5*     Cardiac  Troponin   Lab Results   Component Value Date    TROPONINI <0.01 06/14/2020    TROPONINI 0.02 06/14/2020    TROPONINI <0.01 02/12/2020     Pro-BNP   Lab Results   Component Value Date    PROBNP 59 06/14/2020    PROBNP 54 06/14/2020    PROBNP 12 02/12/2020     Iron studies  No results found for: FERRITIN, IRON, TIBC    EKG:  Sinus, some non specific St changes     ASSESSMENT and PLAN:      Problem   Acute On Chronic Respiratory Failure With Hypoxia and Hypercapnia (Hcc)   Copd Exacerbation (Hcc)   Macrocytic Anemia   Hypertension     · On mechanical ventilation right now  · Repeat ABG in the morning  · CT abdomen and chest without contrast, patient has abdominal tenderness on exam  · Solu-Medrol IV 40 mg daily  · His leukocytosis is likely due to being on steroids, check CRP, procal  · Check respiratory culture respiratory panel  · COVID-19 was tested negative  · Hold antihypertensive medications for now  · Continue nebulizer treatments  · Sedation vacation daily  · Chest x-ray daily  · Pulmonary and critical care consult  · Repeat troponin and CKmb  · Check echo   · Repeat lactic acid in AM  · Check vitamin B12 and B9       Code Status: Full   DVT prophylaxis: Lovenox   Diet: NPO       PLEASE NOTE:    This report was transcribed using typing and voice recognition software. Every effort was made to ensure accuracy; however, inadvertent computerized transcription errors may be present.  More than 50 minutes have been spent in evaluating the patient, reviewing records and results, discussing with staff / family and other treating physicians.     Clinton Reyes MD, MSc   [facility]

## 2020-06-16 ENCOUNTER — APPOINTMENT (OUTPATIENT)
Dept: GENERAL RADIOLOGY | Age: 60
DRG: 130 | End: 2020-06-16
Attending: INTERNAL MEDICINE
Payer: MEDICAID

## 2020-06-16 LAB
AADO2: 114 MMHG
ALBUMIN SERPL-MCNC: 3.6 G/DL (ref 3.5–5.2)
ALP BLD-CCNC: 61 U/L (ref 40–129)
ALT SERPL-CCNC: 10 U/L (ref 0–40)
ANION GAP SERPL CALCULATED.3IONS-SCNC: 5 MMOL/L (ref 7–16)
AST SERPL-CCNC: 13 U/L (ref 0–39)
B.E.: 16.4 MMOL/L (ref -3–3)
BASOPHILS ABSOLUTE: 0.01 E9/L (ref 0–0.2)
BASOPHILS RELATIVE PERCENT: 0.1 % (ref 0–2)
BILIRUB SERPL-MCNC: 0.2 MG/DL (ref 0–1.2)
BILIRUBIN DIRECT: <0.2 MG/DL (ref 0–0.3)
BILIRUBIN, INDIRECT: ABNORMAL MG/DL (ref 0–1)
BUN BLDV-MCNC: 28 MG/DL (ref 8–23)
CALCIUM SERPL-MCNC: 8.5 MG/DL (ref 8.6–10.2)
CHLORIDE BLD-SCNC: 91 MMOL/L (ref 98–107)
CO2: 39 MMOL/L (ref 22–29)
COHB: 0.7 % (ref 0–1.5)
CREAT SERPL-MCNC: 0.8 MG/DL (ref 0.7–1.2)
CRITICAL: ABNORMAL
DATE ANALYZED: ABNORMAL
DATE OF COLLECTION: ABNORMAL
EOSINOPHILS ABSOLUTE: 0 E9/L (ref 0.05–0.5)
EOSINOPHILS RELATIVE PERCENT: 0 % (ref 0–6)
FIO2: 35 %
GFR AFRICAN AMERICAN: >60
GFR NON-AFRICAN AMERICAN: >60 ML/MIN/1.73
GLUCOSE BLD-MCNC: 120 MG/DL (ref 74–99)
HCO3: 42.4 MMOL/L (ref 22–26)
HCT VFR BLD CALC: 30.6 % (ref 37–54)
HEMOGLOBIN: 9.6 G/DL (ref 12.5–16.5)
HHB: 9.7 % (ref 0–5)
IMMATURE GRANULOCYTES #: 0.08 E9/L
IMMATURE GRANULOCYTES %: 0.4 % (ref 0–5)
LAB: ABNORMAL
LACTIC ACID: 1.6 MMOL/L (ref 0.5–2.2)
LYMPHOCYTES ABSOLUTE: 0.37 E9/L (ref 1.5–4)
LYMPHOCYTES RELATIVE PERCENT: 2.1 % (ref 20–42)
Lab: ABNORMAL
MAGNESIUM: 2.3 MG/DL (ref 1.6–2.6)
MCH RBC QN AUTO: 31.3 PG (ref 26–35)
MCHC RBC AUTO-ENTMCNC: 31.4 % (ref 32–34.5)
MCV RBC AUTO: 99.7 FL (ref 80–99.9)
METHB: 0.1 % (ref 0–1.5)
MODE: AC
MONOCYTES ABSOLUTE: 1.05 E9/L (ref 0.1–0.95)
MONOCYTES RELATIVE PERCENT: 5.9 % (ref 2–12)
MRSA CULTURE ONLY: NORMAL
NEUTROPHILS ABSOLUTE: 16.34 E9/L (ref 1.8–7.3)
NEUTROPHILS RELATIVE PERCENT: 91.5 % (ref 43–80)
O2 CONTENT: 13.6 ML/DL
O2 SATURATION: 90.2 % (ref 92–98.5)
O2HB: 89.5 % (ref 94–97)
OPERATOR ID: 2485
PATIENT TEMP: 37 C
PCO2: 59.1 MMHG (ref 35–45)
PDW BLD-RTO: 12.8 FL (ref 11.5–15)
PEEP/CPAP: 5 CMH2O
PFO2: 1.66 MMHG/%
PH BLOOD GAS: 7.47 (ref 7.35–7.45)
PHOSPHORUS: 3.2 MG/DL (ref 2.5–4.5)
PLATELET # BLD: 289 E9/L (ref 130–450)
PMV BLD AUTO: 10.1 FL (ref 7–12)
PO2: 58.1 MMHG (ref 75–100)
POTASSIUM REFLEX MAGNESIUM: 4.1 MMOL/L (ref 3.5–5)
POTASSIUM SERPL-SCNC: 4.1 MMOL/L (ref 3.5–5)
RBC # BLD: 3.07 E12/L (ref 3.8–5.8)
RI(T): 196 %
RR MECHANICAL: 16 B/MIN
SODIUM BLD-SCNC: 135 MMOL/L (ref 132–146)
SOURCE, BLOOD GAS: ABNORMAL
THB: 10.8 G/DL (ref 11.5–16.5)
TIME ANALYZED: 533
TOTAL PROTEIN: 6.3 G/DL (ref 6.4–8.3)
VT MECHANICAL: 500 ML
WBC # BLD: 17.9 E9/L (ref 4.5–11.5)

## 2020-06-16 PROCEDURE — 36592 COLLECT BLOOD FROM PICC: CPT

## 2020-06-16 PROCEDURE — 80076 HEPATIC FUNCTION PANEL: CPT

## 2020-06-16 PROCEDURE — 82805 BLOOD GASES W/O2 SATURATION: CPT

## 2020-06-16 PROCEDURE — 99233 SBSQ HOSP IP/OBS HIGH 50: CPT | Performed by: INTERNAL MEDICINE

## 2020-06-16 PROCEDURE — 2580000003 HC RX 258: Performed by: INTERNAL MEDICINE

## 2020-06-16 PROCEDURE — 2500000003 HC RX 250 WO HCPCS: Performed by: INTERNAL MEDICINE

## 2020-06-16 PROCEDURE — 6360000002 HC RX W HCPCS: Performed by: INTERNAL MEDICINE

## 2020-06-16 PROCEDURE — 94003 VENT MGMT INPAT SUBQ DAY: CPT

## 2020-06-16 PROCEDURE — 85025 COMPLETE CBC W/AUTO DIFF WBC: CPT

## 2020-06-16 PROCEDURE — 36415 COLL VENOUS BLD VENIPUNCTURE: CPT

## 2020-06-16 PROCEDURE — 87077 CULTURE AEROBIC IDENTIFY: CPT

## 2020-06-16 PROCEDURE — 6370000000 HC RX 637 (ALT 250 FOR IP): Performed by: INTERNAL MEDICINE

## 2020-06-16 PROCEDURE — 6360000002 HC RX W HCPCS

## 2020-06-16 PROCEDURE — 36600 WITHDRAWAL OF ARTERIAL BLOOD: CPT

## 2020-06-16 PROCEDURE — 87186 SC STD MICRODIL/AGAR DIL: CPT

## 2020-06-16 PROCEDURE — 87070 CULTURE OTHR SPECIMN AEROBIC: CPT

## 2020-06-16 PROCEDURE — 80048 BASIC METABOLIC PNL TOTAL CA: CPT

## 2020-06-16 PROCEDURE — C9113 INJ PANTOPRAZOLE SODIUM, VIA: HCPCS | Performed by: INTERNAL MEDICINE

## 2020-06-16 PROCEDURE — 83605 ASSAY OF LACTIC ACID: CPT

## 2020-06-16 PROCEDURE — 84100 ASSAY OF PHOSPHORUS: CPT

## 2020-06-16 PROCEDURE — 71045 X-RAY EXAM CHEST 1 VIEW: CPT

## 2020-06-16 PROCEDURE — 94640 AIRWAY INHALATION TREATMENT: CPT

## 2020-06-16 PROCEDURE — 87206 SMEAR FLUORESCENT/ACID STAI: CPT

## 2020-06-16 PROCEDURE — 2000000000 HC ICU R&B

## 2020-06-16 PROCEDURE — 83735 ASSAY OF MAGNESIUM: CPT

## 2020-06-16 RX ORDER — HEPARIN SODIUM (PORCINE) LOCK FLUSH IV SOLN 100 UNIT/ML 100 UNIT/ML
3 SOLUTION INTRAVENOUS EVERY 12 HOURS
Status: DISCONTINUED | OUTPATIENT
Start: 2020-06-16 | End: 2020-06-20

## 2020-06-16 RX ORDER — HEPARIN SODIUM (PORCINE) LOCK FLUSH IV SOLN 100 UNIT/ML 100 UNIT/ML
3 SOLUTION INTRAVENOUS PRN
Status: DISCONTINUED | OUTPATIENT
Start: 2020-06-16 | End: 2020-06-20

## 2020-06-16 RX ORDER — SODIUM CHLORIDE 9 MG/ML
INJECTION, SOLUTION INTRAVENOUS CONTINUOUS
Status: DISCONTINUED | OUTPATIENT
Start: 2020-06-16 | End: 2020-06-17

## 2020-06-16 RX ORDER — HEPARIN SODIUM (PORCINE) LOCK FLUSH IV SOLN 100 UNIT/ML 100 UNIT/ML
SOLUTION INTRAVENOUS
Status: COMPLETED
Start: 2020-06-16 | End: 2020-06-16

## 2020-06-16 RX ADMIN — Medication 300 UNITS: at 07:46

## 2020-06-16 RX ADMIN — ARFORMOTEROL TARTRATE 15 MCG: 15 SOLUTION RESPIRATORY (INHALATION) at 08:11

## 2020-06-16 RX ADMIN — SODIUM CHLORIDE: 9 INJECTION, SOLUTION INTRAVENOUS at 15:28

## 2020-06-16 RX ADMIN — CHLORHEXIDINE GLUCONATE 0.12% ORAL RINSE 15 ML: 1.2 LIQUID ORAL at 07:47

## 2020-06-16 RX ADMIN — PANTOPRAZOLE SODIUM 40 MG: 40 INJECTION, POWDER, FOR SOLUTION INTRAVENOUS at 07:47

## 2020-06-16 RX ADMIN — Medication 100 MCG/HR: at 04:33

## 2020-06-16 RX ADMIN — IPRATROPIUM BROMIDE AND ALBUTEROL SULFATE 1 AMPULE: 2.5; .5 SOLUTION RESPIRATORY (INHALATION) at 15:55

## 2020-06-16 RX ADMIN — PROPOFOL INJECTABLE EMULSION 25 MCG/KG/MIN: 10 INJECTION, EMULSION INTRAVENOUS at 15:27

## 2020-06-16 RX ADMIN — SODIUM CHLORIDE, PRESERVATIVE FREE 300 UNITS: 5 INJECTION INTRAVENOUS at 20:24

## 2020-06-16 RX ADMIN — PROPOFOL INJECTABLE EMULSION 30 MCG/KG/MIN: 10 INJECTION, EMULSION INTRAVENOUS at 09:18

## 2020-06-16 RX ADMIN — SODIUM CHLORIDE, PRESERVATIVE FREE 10 ML: 5 INJECTION INTRAVENOUS at 09:36

## 2020-06-16 RX ADMIN — IPRATROPIUM BROMIDE AND ALBUTEROL SULFATE 1 AMPULE: 2.5; .5 SOLUTION RESPIRATORY (INHALATION) at 11:50

## 2020-06-16 RX ADMIN — SODIUM CHLORIDE, PRESERVATIVE FREE 10 ML: 5 INJECTION INTRAVENOUS at 07:48

## 2020-06-16 RX ADMIN — METHYLPREDNISOLONE SODIUM SUCCINATE 40 MG: 40 INJECTION, POWDER, LYOPHILIZED, FOR SOLUTION INTRAMUSCULAR; INTRAVENOUS at 02:02

## 2020-06-16 RX ADMIN — SODIUM CHLORIDE: 9 INJECTION, SOLUTION INTRAVENOUS at 09:36

## 2020-06-16 RX ADMIN — Medication 10 ML: at 20:23

## 2020-06-16 RX ADMIN — IPRATROPIUM BROMIDE AND ALBUTEROL SULFATE 1 AMPULE: 2.5; .5 SOLUTION RESPIRATORY (INHALATION) at 20:01

## 2020-06-16 RX ADMIN — SODIUM CHLORIDE: 9 INJECTION, SOLUTION INTRAVENOUS at 23:58

## 2020-06-16 RX ADMIN — PROPOFOL INJECTABLE EMULSION 25 MCG/KG/MIN: 10 INJECTION, EMULSION INTRAVENOUS at 19:46

## 2020-06-16 RX ADMIN — Medication 100 MCG/HR: at 21:13

## 2020-06-16 RX ADMIN — Medication 100 MCG/HR: at 15:28

## 2020-06-16 RX ADMIN — METHYLPREDNISOLONE SODIUM SUCCINATE 40 MG: 40 INJECTION, POWDER, LYOPHILIZED, FOR SOLUTION INTRAMUSCULAR; INTRAVENOUS at 09:37

## 2020-06-16 RX ADMIN — ARFORMOTEROL TARTRATE 15 MCG: 15 SOLUTION RESPIRATORY (INHALATION) at 20:01

## 2020-06-16 RX ADMIN — CHLORHEXIDINE GLUCONATE 0.12% ORAL RINSE 15 ML: 1.2 LIQUID ORAL at 20:23

## 2020-06-16 RX ADMIN — BUDESONIDE 500 MCG: 0.5 SUSPENSION RESPIRATORY (INHALATION) at 08:12

## 2020-06-16 RX ADMIN — Medication 10 ML: at 07:47

## 2020-06-16 RX ADMIN — PROPOFOL INJECTABLE EMULSION 30 MCG/KG/MIN: 10 INJECTION, EMULSION INTRAVENOUS at 23:56

## 2020-06-16 RX ADMIN — IPRATROPIUM BROMIDE AND ALBUTEROL SULFATE 1 AMPULE: 2.5; .5 SOLUTION RESPIRATORY (INHALATION) at 08:12

## 2020-06-16 RX ADMIN — Medication 75 MCG/HR: at 10:05

## 2020-06-16 RX ADMIN — SODIUM CHLORIDE, PRESERVATIVE FREE 10 ML: 5 INJECTION INTRAVENOUS at 20:24

## 2020-06-16 RX ADMIN — SODIUM CHLORIDE, PRESERVATIVE FREE 300 UNITS: 5 INJECTION INTRAVENOUS at 07:46

## 2020-06-16 RX ADMIN — BUDESONIDE 500 MCG: 0.5 SUSPENSION RESPIRATORY (INHALATION) at 20:01

## 2020-06-16 RX ADMIN — ENOXAPARIN SODIUM 40 MG: 40 INJECTION SUBCUTANEOUS at 07:47

## 2020-06-16 RX ADMIN — IPRATROPIUM BROMIDE AND ALBUTEROL SULFATE 1 AMPULE: 2.5; .5 SOLUTION RESPIRATORY (INHALATION) at 03:22

## 2020-06-16 ASSESSMENT — PAIN SCALES - GENERAL
PAINLEVEL_OUTOF10: 0

## 2020-06-16 ASSESSMENT — PULMONARY FUNCTION TESTS
PIF_VALUE: 44
PIF_VALUE: 51
PIF_VALUE: 46
PIF_VALUE: 14
PIF_VALUE: 36
PIF_VALUE: 53
PIF_VALUE: 51
PIF_VALUE: 46
PIF_VALUE: 54
PIF_VALUE: 46
PIF_VALUE: 46
PIF_VALUE: 48
PIF_VALUE: 57
PIF_VALUE: 53
PIF_VALUE: 49
PIF_VALUE: 45
PIF_VALUE: 50
PIF_VALUE: 47
PIF_VALUE: 42

## 2020-06-16 NOTE — PLAN OF CARE
Donna Jorge RN  Outcome: Met This Shift     Problem: Pain:  Goal: Pain level will decrease  Description: Pain level will decrease  6/16/2020 0732 by King Bence, RN  Outcome: Met This Shift  6/16/2020 0003 by Donna Jorge RN  Outcome: Met This Shift  Goal: Recognizes and communicates pain  Description: Recognizes and communicates pain  6/16/2020 0732 by King Bence, RN  Outcome: Met This Shift  6/16/2020 0003 by Donna Jorge RN  Outcome: Met This Shift  Goal: Control of acute pain  Description: Control of acute pain  6/16/2020 0732 by King Bence, RN  Outcome: Met This Shift  6/16/2020 0003 by Donna Jorge RN  Outcome: Met This Shift  Goal: Control of chronic pain  Description: Control of chronic pain  6/16/2020 0732 by King Bence, RN  Outcome: Met This Shift  6/16/2020 0003 by Donna Jorge RN  Outcome: Met This Shift     Problem: Restraint Use - Nonviolent/Non-Self-Destructive Behavior:  Goal: Absence of restraint indications  Description: Absence of restraint indications  6/16/2020 0732 by King Bence, RN  Outcome: Met This Shift  6/16/2020 0003 by Donna Jorge RN  Outcome: Met This Shift  Goal: Absence of restraint-related injury  Description: Absence of restraint-related injury  6/16/2020 0732 by King Bence, RN  Outcome: Met This Shift  6/16/2020 0003 by Donna Jorge RN  Outcome: Met This Shift     Problem: Pain:  Goal: Pain level will decrease  Description: Pain level will decrease  6/16/2020 0732 by King Bence, RN  Outcome: Met This Shift  6/16/2020 0003 by Donna Jorge RN  Outcome: Met This Shift  Goal: Control of acute pain  Description: Control of acute pain  6/16/2020 0732 by King Bence, RN  Outcome: Met This Shift  6/16/2020 0003 by Donna Jorge RN  Outcome: Met This Shift  Goal: Control of chronic pain  Description: Control of chronic pain  6/16/2020 0732 by King Bence, RN  Outcome: Met This Shift  6/16/2020 0003 by Donna Jorge RN  Outcome: Met This Shift

## 2020-06-16 NOTE — PROGRESS NOTES
30.9* 30.6*   .6* 102.3* 99.7   MCH 30.8 30.8 31.3   MCHC 29.2* 30.1* 31.4*   RDW 12.0 12.5 12.8    265 289   MPV 10.0 10.1 10.1     Assessment:    Principal Problem:    Acute on chronic respiratory failure with hypoxia and hypercapnia (HCC)  Active Problems:    Hypertension    COPD exacerbation (HCC)    Macrocytic anemia  Resolved Problems:    * No resolved hospital problems. *      Plan:    COPD exacerbation: Continue Brovana and Pulmicort. Solu-Medrol stopped. Acute hypoxic and hypercapnic respiratory failure: Secondary to COPD exacerbation. AB.47/59.1/58.1/42. 4. COVID negative. Wean from vent as tolerated. Hypotension, improving: Possibly secondary to propofol. Continue to wean off propofol. Off levophed, goal MAP > 65. Continue IV fluids. Monitor hemodynamics. NOTE: This report was transcribed using voice recognition software. Every effort was made to ensure accuracy; however, inadvertent computerized transcription errors may be present.   Electronically signed by Celine Mcpherson MD on 2020 at 3:54 PM

## 2020-06-16 NOTE — CONSULTS
sodium chloride (PF) 0.9 % injection 10 mL  10 mL Intravenous Daily Yvan Morgan MD   10 mL at 06/15/20 0933    norepinephrine (LEVOPHED) 16 mg in dextrose 5 % 250 mL infusion  2 mcg/min Intravenous Continuous Cindy Piña MD   Stopped at 06/15/20 1628       SOCIAL AND OCCUPATIONAL HEALTH:  Social History     Tobacco Use   Smoking Status Current Some Day Smoker    Packs/day: 0.50    Years: 40.00    Pack years: 20.00    Last attempt to quit: 2016    Years since quittin.1   Smokeless Tobacco Never Used     TB :no  Pets dog  Industrial exposure:minimal  Birds :none    SURGICAL HISTORY:   No past surgical history on file. FAMILY HISTORY:   Lung cancer:none  DVT or PE None     REVIEW OF SYSTEMS:  Constitutional: General health is good . There has been no weight changes. Mild fatigue no   Head: Patient denies any history of trauma, convulsive disorder or syncope. Skin:  Patient denies history of changes in pigmentation, eruptions or pruritus. No easy bruising or bleeding. EENT: no nasal congestion   Cardiovascular ,No chest pain ,No edema ,  Respiration:SOB yes  :  ,MANNING :yes ++  Gastrointestinal:No GI bleed ,no melena  ,no hematemesis    Musculoskeletal: no joint pain ,no swelling  Neurological:no , syncope. Denies twitching, convulsions, loss of consciousness or memory. Endocrine:  . No history of goiter, exophthalmos or dryness of skin. The patient has no history of diabetes. Hematopoietic:  No history of bleeding disorders or easy bruising. Rheumatic:  No connective tissue disease history or polyarthritis/inflammatory joint disease. PHYSICAL EXAMINATION:  Vitals:    06/15/20 2300   BP: (!) 84/53   Pulse: 73   Resp: 16   Temp:    SpO2: 94%     Constitutional: This is a well developed, well nourished 60y.o. year old male who is alert, oriented, cooperative and in no apparent distress. Head was normocephalic and atraumatic.     EENT: Mallampati class :  Extraocular muscles intact. External canals are patent and no discharge was appreciated. Septum was midline,   mucosa was without erythema, exudates or cobblestoning. No thrush was noted. Neck: Supple without thyromegaly. No elevated JVP. Trachea was midline. No carotid bruits were auscultated. Respiratory: Rhonchi bilateral    Cardiovascular: Regular without murmur, clicks, gallops or rubs. There is no left or right ventricular heave. Pulses:  Carotid, radial and femoral pulses were equally bilaterally. Abdomen: Slightly rounded and soft without organomegaly. No rebound, rigidity or guarding was appreciated. Lymphatic: No lymphadenopathy. Musculoskeletal: No joint deformity  Extremities: Mild swelling bilaterally +1 edema  Skin:  Warm and dry. Good color, turgor and pigmentation. No lesions or scars. Neurological/Psychiatric: The patient's general behavior, level of consciousness, thought content and emotional status is normal.  Cranial nerves II-XII are intact. DATA:     FVC 1.37 which is 50% of predicted   FEV1 0.62 which is 17% of predicted    FEV1/FVC 45 which is 57 of predicted with lower limit of normal 68     MVV 22 which is 14% of predicted   impression very severe obstruction  DLCO 38 as. Last PFT which is booked him a severe reduction in DLCO    1 antitrypsin 167 with phenotype MM    IMPRESSION:      Acute Hypercapnic resp failure   Acute COPD exacerbation   1-very severe COPD  2-obstructive sleep apnea  3-obesity  4--chronic hypoxic respiratory failure  5-Nicotine dependence    PLAN:      *- MV as per ICU   *-IV steroids   *-no signs of infection   *-negative  viral panel   *- Sputum culture   *- procalcitonin 0.06  *-BD  ICS     Will try arrange trilogy on discharge   Wean steroids  Nebs  Home inhalers  Pulm rehab as OP    Thank you for allowing me to participate in [facility].  I will keep following with you ,should you have any concerns ,please contact me at [phone]       We'll see

## 2020-06-16 NOTE — PROGRESS NOTES
127/65   Pulse 103   Temp 98.7 °F (37.1 °C)   Resp 15   Ht 6' 2\" (1.88 m)   Wt 240 lb 1.3 oz (108.9 kg)   SpO2 92%   BMI 30.82 kg/m²   Tmax over 24 hours:  Temp (24hrs), Av.7 °F (37.1 °C), Min:98.3 °F (36.8 °C), Max:99 °F (37.2 °C)      Patient Vitals for the past 6 hrs:   BP Temp Pulse Resp SpO2 Weight   20 0600 127/65 -- 103 15 92 % --   20 0543 127/65 -- 94 15 92 % --   20 0500 138/80 -- 99 16 -- --   20 0418 -- -- -- -- -- 240 lb 1.3 oz (108.9 kg)   20 0400 109/66 98.7 °F (37.1 °C) 90 16 94 % --   20 0320 -- -- 84 16 93 % --   20 0300 116/64 -- 84 16 93 % --   20 0205 116/64 -- 86 12 95 % --         Intake/Output Summary (Last 24 hours) at 2020 0756  Last data filed at 2020 0600  Gross per 24 hour   Intake 1451 ml   Output 990 ml   Net 461 ml     Wt Readings from Last 2 Encounters:   20 240 lb 1.3 oz (108.9 kg)   20 240 lb (108.9 kg)     Body mass index is 30.82 kg/m². PHYSICAL EXAMINATION:    CONSTITUTIONAL:  Intubate and alert and responsive to commands. Obese  EYES:  Lids and lashes normal, Pupils pinpoint  ENT:  normocepalic, without obvious abnormality, atraumatic  LUNGS:  Breath sounds very distant, No increased work of breathing, good air exchange, clear to auscultation bilaterally, no crackles or wheezing  CARDIOVASCULAR:  Normal apical impulse, regular rate and rhythm, normal S1 and S2, no S3 or S4, and no murmur noted  ABDOMEN:  No scars, normal bowel sounds, soft, non-distended, non-tender, no masses palpated, no hepatosplenomegally  GENITAL/URINARY:  Parra in place  MUSCULOSKELETAL:  there is no redness, warmth, or swelling of the joints  NEUROLOGIC:  Intubated. Alert and following commands.    SKIN:  no bruising or bleeding, normal skin color, texture, turgor and no redness, warmth, or swelling     Any additional physical findings:    MEDICATIONS:    Scheduled Meds:   heparin flush  3 mL Intravenous Q12H    characterized as simple-appearing, is likely benign. No    follow-up imaging is recommended for these lesions per consensus    recommendations based on imaging criteria. This report has been electronically signed by Lori Templeton MD.      XR CHEST PORTABLE   Final Result      Chronic obstructive pulmonary disease      Support lines in satisfactory position      Unchanged from prior study with no evidence of airspace consolidation   or pulmonary venous congestion. XR CHEST PORTABLE    (Results Pending)   XR CHEST PORTABLE    (Results Pending)   XR CHEST PORTABLE    (Results Pending)       Chest Xray (6/16/2020):    ASSESSMENT:     SYSTEMS ASSESSMENT     Neuro   Intubated  Awake and responsive to commands  On ventilator  Wean propofol as tolerated  Trial precedex tomorrow     Respiratory   Acute hypercapnic respiratory failure requiring mechanical ventilation  ABGs as needed  Trial pressure support 8 on 6/16 - not well tolerated  Keep vented today, attempt wean tomorrow  Wean oxygen as tolerated.  Keep O2 sat 90-92%  Solumedrol 40mg q8h     Cardiovascular   Hypotension   -Off pressors  History of hypertension - will restart meds when clinically able  Lovenox for DVT ppx     Gastrointestinal   Protonix for vent GI ppx  Lactic acidosis - resolved  No initiation of Tube feed today, if not able to be extubated tomorrow will start tube feeding     Renal   Renal function wnl  Continue to monitor with BMP  Parra in place    Cont 100 IV NS      Infectious Disease   No signs of current infection  Resp culture sent  Resp molecular panel negative   WBC count elevated, likely secondary to steroids and intubation stress               - 17.9 6/16              - Continue to trend daily     Hematology/Oncology   Hgb 9.6 - stable and improved  No signs of active bleeding  Monitor closely      Endocrine   No known history of diabetes  Monitor blood sugar closely, on steroids              -120 6/16   Continue to monitor     Social/Spiritual/DNR/Other   Full          PLAN:     WEAN PER PROTOCOL:  [] No   [x] Yes  [] N/A    DISCONTINUE ANY LABS:   [x] No   [] Yes    ICU PROPHYLAXIS:  Stress ulcer:  [x] PPI Agent  [] [id number] [] Sucralfate  [] Other:  VTE:   [x] Enoxaparin  [] Unfract. Heparin Subcut  [] EPC Cuffs    NUTRITION:  [x] NPO [] Tube Feeding (Specify: ) [] TPN  [] PO (Diet: Diet NPO Effective Now)    HOME MEDICATIONS RECONCILED: [] No  [x] Yes    INSULIN DRIP:   [x] No   [] Yes    CONSULTATION NEEDED:  [] No   [x] Yes    FAMILY UPDATED:    [] No   [x] Yes    TRANSFER OUT OF ICU:   [x] No   [] Yes    ADDITIONAL PLAN:    1. Dru Alicea M.D.                       6/16/2020, 7:56 AM    Critical Care Attending Addendum:    Patient seen and examined with the house staff. X-rays personally reviewed through the PACS. Family is updated at the bedside as available. Additional findings listed below as necessary. Additional comments:  1. Acute hypercapnic respiratory failure from 2  2. Acute exacerbation of end stage COPD  3. Failed cpap ps 8 with tachycardia and tachypnea with ectopy, will switch to Precedex tomorrow and retry cpap at that time. 4. Continue steroids and nebs. 5. U/O low so continue iv fluids and will start enteral nutrition tomorrow if not extubated.     >30 min CCT

## 2020-06-17 LAB
ALBUMIN SERPL-MCNC: 3.4 G/DL (ref 3.5–5.2)
ALP BLD-CCNC: 55 U/L (ref 40–129)
ALT SERPL-CCNC: 9 U/L (ref 0–40)
ANION GAP SERPL CALCULATED.3IONS-SCNC: 6 MMOL/L (ref 7–16)
AST SERPL-CCNC: 11 U/L (ref 0–39)
B.E.: 9.7 MMOL/L (ref -3–0)
BASOPHILS ABSOLUTE: 0.02 E9/L (ref 0–0.2)
BASOPHILS RELATIVE PERCENT: 0.1 % (ref 0–2)
BILIRUB SERPL-MCNC: <0.2 MG/DL (ref 0–1.2)
BILIRUBIN DIRECT: <0.2 MG/DL (ref 0–0.3)
BILIRUBIN, INDIRECT: ABNORMAL MG/DL (ref 0–1)
BUN BLDV-MCNC: 29 MG/DL (ref 8–23)
CALCIUM SERPL-MCNC: 8.4 MG/DL (ref 8.6–10.2)
CHLORIDE BLD-SCNC: 92 MMOL/L (ref 98–107)
CO2: 39 MMOL/L (ref 22–29)
CREAT SERPL-MCNC: 0.9 MG/DL (ref 0.7–1.2)
CRITICAL NOTIFICATION: YES
CULTURE, RESPIRATORY: NORMAL
DELIVERY SYSTEMS: ABNORMAL
DEVICE: ABNORMAL
EOSINOPHILS ABSOLUTE: 0.01 E9/L (ref 0.05–0.5)
EOSINOPHILS RELATIVE PERCENT: 0.1 % (ref 0–6)
FIO2 ARTERIAL: 40
GFR AFRICAN AMERICAN: >60
GFR NON-AFRICAN AMERICAN: >60 ML/MIN/1.73
GLUCOSE BLD-MCNC: 86 MG/DL (ref 74–99)
HCO3 ARTERIAL: 37.3 MMOL/L (ref 22–26)
HCT VFR BLD CALC: 30.4 % (ref 37–54)
HEMOGLOBIN: 9.1 G/DL (ref 12.5–16.5)
IMMATURE GRANULOCYTES #: 0.1 E9/L
IMMATURE GRANULOCYTES %: 0.7 % (ref 0–5)
LACTIC ACID: 0.9 MMOL/L (ref 0.5–2.2)
LYMPHOCYTES ABSOLUTE: 1.1 E9/L (ref 1.5–4)
LYMPHOCYTES RELATIVE PERCENT: 7.7 % (ref 20–42)
MAGNESIUM: 2.5 MG/DL (ref 1.6–2.6)
MCH RBC QN AUTO: 30.5 PG (ref 26–35)
MCHC RBC AUTO-ENTMCNC: 29.9 % (ref 32–34.5)
MCV RBC AUTO: 102 FL (ref 80–99.9)
MODE: AC
MONOCYTES ABSOLUTE: 1.77 E9/L (ref 0.1–0.95)
MONOCYTES RELATIVE PERCENT: 12.3 % (ref 2–12)
NEUTROPHILS ABSOLUTE: 11.37 E9/L (ref 1.8–7.3)
NEUTROPHILS RELATIVE PERCENT: 79.1 % (ref 43–80)
O2 SATURATION: 91.1 % (ref 92–98.5)
OPERATOR ID: 2070
PCO2 ARTERIAL: 68 MMHG (ref 35–45)
PDW BLD-RTO: 13 FL (ref 11.5–15)
PH BLOOD GAS: 7.35 (ref 7.35–7.45)
PHOSPHORUS: 3.4 MG/DL (ref 2.5–4.5)
PLATELET # BLD: 260 E9/L (ref 130–450)
PMV BLD AUTO: 10 FL (ref 7–12)
PO2 ARTERIAL: 67.3 MMHG (ref 60–80)
POSITIVE END EXP PRESS: 5 CMH2O
POTASSIUM REFLEX MAGNESIUM: 3.9 MMOL/L (ref 3.5–5)
POTASSIUM SERPL-SCNC: 3.9 MMOL/L (ref 3.5–5)
RBC # BLD: 2.98 E12/L (ref 3.8–5.8)
RBC # BLD: NORMAL 10*6/UL
RESPIRATORY RATE: 16 B/MIN
SMEAR, RESPIRATORY: NORMAL
SODIUM BLD-SCNC: 137 MMOL/L (ref 132–146)
SOURCE, BLOOD GAS: ABNORMAL
TIDAL VOLUME: 480 ML
TOTAL PROTEIN: 6.1 G/DL (ref 6.4–8.3)
TROPONIN: <0.01 NG/ML (ref 0–0.03)
WBC # BLD: 14.4 E9/L (ref 4.5–11.5)

## 2020-06-17 PROCEDURE — 36415 COLL VENOUS BLD VENIPUNCTURE: CPT

## 2020-06-17 PROCEDURE — 2580000003 HC RX 258: Performed by: INTERNAL MEDICINE

## 2020-06-17 PROCEDURE — 80076 HEPATIC FUNCTION PANEL: CPT

## 2020-06-17 PROCEDURE — 99233 SBSQ HOSP IP/OBS HIGH 50: CPT | Performed by: INTERNAL MEDICINE

## 2020-06-17 PROCEDURE — 2000000000 HC ICU R&B

## 2020-06-17 PROCEDURE — 6360000002 HC RX W HCPCS: Performed by: INTERNAL MEDICINE

## 2020-06-17 PROCEDURE — 93005 ELECTROCARDIOGRAM TRACING: CPT | Performed by: INTERNAL MEDICINE

## 2020-06-17 PROCEDURE — 85025 COMPLETE CBC W/AUTO DIFF WBC: CPT

## 2020-06-17 PROCEDURE — 94003 VENT MGMT INPAT SUBQ DAY: CPT

## 2020-06-17 PROCEDURE — 36592 COLLECT BLOOD FROM PICC: CPT

## 2020-06-17 PROCEDURE — 6370000000 HC RX 637 (ALT 250 FOR IP): Performed by: INTERNAL MEDICINE

## 2020-06-17 PROCEDURE — 84484 ASSAY OF TROPONIN QUANT: CPT

## 2020-06-17 PROCEDURE — 2580000003 HC RX 258

## 2020-06-17 PROCEDURE — C9113 INJ PANTOPRAZOLE SODIUM, VIA: HCPCS | Performed by: INTERNAL MEDICINE

## 2020-06-17 PROCEDURE — 82803 BLOOD GASES ANY COMBINATION: CPT

## 2020-06-17 PROCEDURE — 94640 AIRWAY INHALATION TREATMENT: CPT

## 2020-06-17 PROCEDURE — 84100 ASSAY OF PHOSPHORUS: CPT

## 2020-06-17 PROCEDURE — 83735 ASSAY OF MAGNESIUM: CPT

## 2020-06-17 PROCEDURE — 2500000003 HC RX 250 WO HCPCS: Performed by: INTERNAL MEDICINE

## 2020-06-17 PROCEDURE — 80048 BASIC METABOLIC PNL TOTAL CA: CPT

## 2020-06-17 PROCEDURE — 83605 ASSAY OF LACTIC ACID: CPT

## 2020-06-17 PROCEDURE — 36600 WITHDRAWAL OF ARTERIAL BLOOD: CPT

## 2020-06-17 RX ORDER — METHYLPREDNISOLONE SODIUM SUCCINATE 40 MG/ML
40 INJECTION, POWDER, LYOPHILIZED, FOR SOLUTION INTRAMUSCULAR; INTRAVENOUS EVERY 8 HOURS
Status: DISCONTINUED | OUTPATIENT
Start: 2020-06-17 | End: 2020-06-20

## 2020-06-17 RX ADMIN — Medication 10 ML: at 20:52

## 2020-06-17 RX ADMIN — PANTOPRAZOLE SODIUM 40 MG: 40 INJECTION, POWDER, FOR SOLUTION INTRAVENOUS at 08:12

## 2020-06-17 RX ADMIN — IPRATROPIUM BROMIDE AND ALBUTEROL SULFATE 1 AMPULE: 2.5; .5 SOLUTION RESPIRATORY (INHALATION) at 12:21

## 2020-06-17 RX ADMIN — IPRATROPIUM BROMIDE AND ALBUTEROL SULFATE 1 AMPULE: 2.5; .5 SOLUTION RESPIRATORY (INHALATION) at 00:03

## 2020-06-17 RX ADMIN — BUDESONIDE 500 MCG: 0.5 SUSPENSION RESPIRATORY (INHALATION) at 20:06

## 2020-06-17 RX ADMIN — SODIUM CHLORIDE, PRESERVATIVE FREE 10 ML: 5 INJECTION INTRAVENOUS at 08:15

## 2020-06-17 RX ADMIN — PROPOFOL INJECTABLE EMULSION 30 MCG/KG/MIN: 10 INJECTION, EMULSION INTRAVENOUS at 10:22

## 2020-06-17 RX ADMIN — BUDESONIDE 500 MCG: 0.5 SUSPENSION RESPIRATORY (INHALATION) at 07:50

## 2020-06-17 RX ADMIN — IPRATROPIUM BROMIDE AND ALBUTEROL SULFATE 1 AMPULE: 2.5; .5 SOLUTION RESPIRATORY (INHALATION) at 15:32

## 2020-06-17 RX ADMIN — ARFORMOTEROL TARTRATE 15 MCG: 15 SOLUTION RESPIRATORY (INHALATION) at 07:49

## 2020-06-17 RX ADMIN — Medication 100 MCG/HR: at 02:11

## 2020-06-17 RX ADMIN — Medication 100 MCG/HR: at 18:21

## 2020-06-17 RX ADMIN — IPRATROPIUM BROMIDE AND ALBUTEROL SULFATE 1 AMPULE: 2.5; .5 SOLUTION RESPIRATORY (INHALATION) at 04:03

## 2020-06-17 RX ADMIN — Medication 100 MCG/HR: at 06:52

## 2020-06-17 RX ADMIN — SODIUM CHLORIDE, PRESERVATIVE FREE 10 ML: 5 INJECTION INTRAVENOUS at 08:16

## 2020-06-17 RX ADMIN — DEXMEDETOMIDINE HYDROCHLORIDE 0.9 MCG/KG/HR: 100 INJECTION, SOLUTION INTRAVENOUS at 22:26

## 2020-06-17 RX ADMIN — CHLORHEXIDINE GLUCONATE 0.12% ORAL RINSE 15 ML: 1.2 LIQUID ORAL at 08:12

## 2020-06-17 RX ADMIN — SODIUM CHLORIDE, PRESERVATIVE FREE 10 ML: 5 INJECTION INTRAVENOUS at 14:17

## 2020-06-17 RX ADMIN — ENOXAPARIN SODIUM 40 MG: 40 INJECTION SUBCUTANEOUS at 08:12

## 2020-06-17 RX ADMIN — DEXMEDETOMIDINE HYDROCHLORIDE 0.2 MCG/KG/HR: 100 INJECTION, SOLUTION INTRAVENOUS at 10:48

## 2020-06-17 RX ADMIN — METHYLPREDNISOLONE SODIUM SUCCINATE 40 MG: 40 INJECTION, POWDER, LYOPHILIZED, FOR SOLUTION INTRAMUSCULAR; INTRAVENOUS at 21:26

## 2020-06-17 RX ADMIN — CHLORHEXIDINE GLUCONATE 0.12% ORAL RINSE 15 ML: 1.2 LIQUID ORAL at 20:52

## 2020-06-17 RX ADMIN — SODIUM CHLORIDE: 9 INJECTION, SOLUTION INTRAVENOUS at 09:43

## 2020-06-17 RX ADMIN — METHYLPREDNISOLONE SODIUM SUCCINATE 40 MG: 40 INJECTION, POWDER, LYOPHILIZED, FOR SOLUTION INTRAMUSCULAR; INTRAVENOUS at 14:40

## 2020-06-17 RX ADMIN — Medication 150 MCG/HR: at 22:26

## 2020-06-17 RX ADMIN — SODIUM CHLORIDE, PRESERVATIVE FREE 300 UNITS: 5 INJECTION INTRAVENOUS at 08:14

## 2020-06-17 RX ADMIN — ARFORMOTEROL TARTRATE 15 MCG: 15 SOLUTION RESPIRATORY (INHALATION) at 20:06

## 2020-06-17 RX ADMIN — DEXMEDETOMIDINE HYDROCHLORIDE 0.7 MCG/KG/HR: 100 INJECTION, SOLUTION INTRAVENOUS at 17:05

## 2020-06-17 RX ADMIN — IPRATROPIUM BROMIDE AND ALBUTEROL SULFATE 1 AMPULE: 2.5; .5 SOLUTION RESPIRATORY (INHALATION) at 07:48

## 2020-06-17 RX ADMIN — Medication 10 ML: at 08:13

## 2020-06-17 RX ADMIN — WATER 2 ML: 1 INJECTION INTRAMUSCULAR; INTRAVENOUS; SUBCUTANEOUS at 14:40

## 2020-06-17 RX ADMIN — PROPOFOL INJECTABLE EMULSION 25 MCG/KG/MIN: 10 INJECTION, EMULSION INTRAVENOUS at 04:51

## 2020-06-17 RX ADMIN — SODIUM CHLORIDE, PRESERVATIVE FREE 300 UNITS: 5 INJECTION INTRAVENOUS at 20:52

## 2020-06-17 ASSESSMENT — PULMONARY FUNCTION TESTS
PIF_VALUE: 40
PIF_VALUE: 45
PIF_VALUE: 16
PIF_VALUE: 30
PIF_VALUE: 44
PIF_VALUE: 16
PIF_VALUE: 49
PIF_VALUE: 16
PIF_VALUE: 49
PIF_VALUE: 16
PIF_VALUE: 16
PIF_VALUE: 47
PIF_VALUE: 37
PIF_VALUE: 16
PIF_VALUE: 33
PIF_VALUE: 16
PIF_VALUE: 42
PIF_VALUE: 47
PIF_VALUE: 42
PIF_VALUE: 45
PIF_VALUE: 44
PIF_VALUE: 16
PIF_VALUE: 43
PIF_VALUE: 26
PIF_VALUE: 16
PIF_VALUE: 52
PIF_VALUE: 42
PIF_VALUE: 46
PIF_VALUE: 17
PIF_VALUE: 16
PIF_VALUE: 44
PIF_VALUE: 27
PIF_VALUE: 16
PIF_VALUE: 29
PIF_VALUE: 16
PIF_VALUE: 43
PIF_VALUE: 54
PIF_VALUE: 16
PIF_VALUE: 44
PIF_VALUE: 44
PIF_VALUE: 47
PIF_VALUE: 16

## 2020-06-17 ASSESSMENT — PAIN SCALES - GENERAL
PAINLEVEL_OUTOF10: 0

## 2020-06-17 NOTE — PROGRESS NOTES
normocepalic, without obvious abnormality, atraumatic  LUNGS:  Coarse breath sounds b/l, but no rales or wheezes appreciated; No increased work of breathing, good air movement noted throughout  CARDIOVASCULAR: RR with intermittent dropped beats, no m/g/r noted; normal S1/S2; normal apical impulse  ABDOMEN:  abd notable distended, but non-tender to palpation; no masses or hepatosplenomegally  GENITAL/URINARY:  Parra in place  MUSCULOSKELETAL:  there is no redness, warmth, or swelling of the joints  NEUROLOGIC:  Intubated. Alert and following commands.    SKIN:  no bruising or bleeding, normal skin color, texture, turgor and no redness, warmth, or swelling     Any additional physical findings:    MEDICATIONS:    Scheduled Meds:   heparin flush  3 mL Intravenous Q12H    sodium chloride flush  10 mL Intravenous 2 times per day    enoxaparin  40 mg Subcutaneous Daily    Arformoterol Tartrate  15 mcg Nebulization BID    budesonide  0.5 mg Nebulization BID    chlorhexidine  15 mL Mouth/Throat BID    pantoprazole  40 mg Intravenous Daily    And    sodium chloride (PF)  10 mL Intravenous Daily     Continuous Infusions:   dexmedetomidine (PRECEDEX) IV infusion 0.4 mcg/kg/hr (06/17/20 1150)    sodium chloride 50 mL/hr at 06/17/20 1013    fentaNYL 5 mcg/ml in 0.9%  ml infusion 100 mcg/hr (06/17/20 0652)    propofol Stopped (06/17/20 1125)     PRN Meds:   heparin flush, 3 mL, PRN  sodium chloride flush, 10 mL, PRN  potassium chloride, 40 mEq, PRN    Or  potassium alternative oral replacement, 40 mEq, PRN    Or  potassium chloride, 10 mEq, PRN  magnesium sulfate, 1 g, PRN  acetaminophen, 650 mg, Q6H PRN    Or  acetaminophen, 650 mg, Q6H PRN  magnesium hydroxide, 30 mL, Daily PRN  promethazine, 12.5 mg, Q6H PRN    Or  ondansetron, 4 mg, Q6H PRN  albuterol, 2.5 mg, Q6H PRN  ipratropium-albuterol, 1 ampule, Q4H PRN          VENT SETTINGS (Comprehensive) (if applicable):  Vent Information  $Ventilation: $Subsequent Yes    ADDITIONAL PLAN:    Nicki Wallace D.O., PGY-1                    6/17/2020, 1:12 PM      Critical Care Attending Addendum:    Patient seen and examined with the house staff. EKG viewed. X-rays personally reviewed through the PACS. Family is updated at the bedside as available. Additional findings listed below as necessary. Additional comments:  1. Acute hypoxic and hypercapnic respiratory failure again promptly failed wean with tachycardia and tachypnea with desaturation. Will try again tomorrow when on Precedex. 2. COPD exac no better, resume steroids and continue nebs as is. 3. Anemia hg acceptable. 4. Anxiety switched to Precedex. 5.  Enteral nutrition. 6.  Continue dvt and gi prophylaxis.     30 min CCT  '

## 2020-06-17 NOTE — PATIENT CARE CONFERENCE
Intensive Care Daily Quality Rounding Checklist        ICU Team Members: Dr. Leydi Sutton, Dr. Scott Disla (resident), clinical pharmacist, charge nurse, bedside nurse, respiratory therapist     ICU Day #: 4     Intubation Date: June 14, 2020     Ventilator Day #: 4     Central Line Insertion Date: June 14, 2020                             Day #: 4      Arterial Line Insertion Date: NA                             Day #: NA     DVT Prophylaxis: Lovenox    GI Prophylaxis: Protonix     Parra Catheter Insertion Date: June 14, 2020                             Day #: 4                             Continued need (if yes, reason documented and discussed with physician): yes, strict I&O's     Skin Issues/ Wounds and ordered treatment discussed on rounds: N/A     Goals/ Plans for the Day: Daily labs, start TF, wean vent as tolerated, cpap trial, soft wrist restraints to prevent pulling tubes, change Propofol to Precedex

## 2020-06-17 NOTE — PROGRESS NOTES
Department of Internal Medicine  Division of Pulmonary, Critical Care & Sleep Medicine  Pulmonary [address] [facility]                                             Pulmonary Progress Note follow up       CC SOB   Patient on Vent     HISTORY OF PRESENT ILLNESS:      Still on Vent  Sedated  Fail weaning trial     PHYSICAL EXAMINATION:  Vitals:    06/16/20 2200   BP: 112/64   Pulse: 86   Resp: 15   Temp:    SpO2:      Constitutional: This is a well developed, well nourished 60y.o. year old male who is alert, oriented, cooperative and in no apparent distress. Head was normocephalic and atraumatic. EENT: Mallampati class :  Extraocular muscles intact. External canals are patent and no discharge was appreciated. Septum was midline,   mucosa was without erythema, exudates or cobblestoning. No thrush was noted. Neck: Supple without thyromegaly. No elevated JVP. Trachea was midline. No carotid bruits were auscultated. Respiratory: Rhonchi bilateral    Cardiovascular: Regular without murmur, clicks, gallops or rubs. There is no left or right ventricular heave. Pulses:  Carotid, radial and femoral pulses were equally bilaterally. Abdomen: Slightly rounded and soft without organomegaly. No rebound, rigidity or guarding was appreciated. Lymphatic: No lymphadenopathy. Musculoskeletal: No joint deformity  Extremities: Mild swelling bilaterally +1 edema  Skin:  Warm and dry. Good color, turgor and pigmentation. No lesions or scars. Neurological/Psychiatric: The patient's general behavior, level of consciousness, thought content and emotional status is normal.  Cranial nerves II-XII are intact. DATA:     FVC 1.37 which is 50% of predicted   FEV1 0.62 which is 17% of predicted    FEV1/FVC 45 which is 57 of predicted with lower limit of normal 68     MVV 22 which is 14% of predicted   impression very severe obstruction  DLCO 38 as.   Last PFT which is booked him a severe reduction in DLCO    1 antitrypsin 167 with phenotype MM    IMPRESSION:      Acute Hypercapnic resp failure   Acute COPD exacerbation   1-very severe COPD  2-obstructive sleep apnea  3-obesity  4--chronic hypoxic respiratory failure  5-Nicotine dependence    PLAN:      *- MV as per ICU ,discuss with Dr Alaina Daugherty . plan to try weaning tomorrow   *-IV steroids ,wean gradually   *-no signs of infection   *-negative  viral panel   *- Sputum culture pending   *- procalcitonin 0.06  *-BD  ICS  Will try arrange trilogy on discharge   Nebs  Home inhalers  Pulm rehab as OP          Altaf Michelle MD  Pulmonary & Critical Care Medicine

## 2020-06-17 NOTE — PROGRESS NOTES
[facility] Progress Note    Admitting Date and Time: 6/14/2020 10:52 PM  Admit Dx: Respiratory failure ([facility] [address] [J96.90]    Subjective:  Patient is being followed for Respiratory failure ([facility] [address] [J96.90]     The patient is intubated and sedated. He is currently following commands.  methylPREDNISolone  40 mg Intravenous Q8H    heparin flush  3 mL Intravenous Q12H    sodium chloride flush  10 mL Intravenous 2 times per day    enoxaparin  40 mg Subcutaneous Daily    Arformoterol Tartrate  15 mcg Nebulization BID    budesonide  0.5 mg Nebulization BID    chlorhexidine  15 mL Mouth/Throat BID    pantoprazole  40 mg Intravenous Daily    And    sodium chloride (PF)  10 mL Intravenous Daily     heparin flush, 3 mL, PRN  sodium chloride flush, 10 mL, PRN  potassium chloride, 40 mEq, PRN    Or  potassium alternative oral replacement, 40 mEq, PRN    Or  potassium chloride, 10 mEq, PRN  magnesium sulfate, 1 g, PRN  acetaminophen, 650 mg, Q6H PRN    Or  acetaminophen, 650 mg, Q6H PRN  magnesium hydroxide, 30 mL, Daily PRN  promethazine, 12.5 mg, Q6H PRN    Or  ondansetron, 4 mg, Q6H PRN  albuterol, 2.5 mg, Q6H PRN  ipratropium-albuterol, 1 ampule, Q4H PRN         Objective:    BP (!) 83/59   Pulse 77   Temp 98.8 °F (37.1 °C) (Axillary)   Resp 16   Ht 6' 2\" (1.88 m)   Wt 240 lb 1.3 oz (108.9 kg)   SpO2 95%   BMI 30.82 kg/m²     Gen:  Intubated and sedated but following commands  HEENT: ETT in place  CV: Tachycardia, no m/r/g  Resp: Equal chest rise b/l, decreased air movement b/l  Abd: Soft, no grimace to palpation, ND  Ext: Not currently moving extremities, 1+ BLE edema     Recent Labs     06/15/20  0535 06/16/20  0505 06/17/20  0415    135 137   K 4.3  4.3 4.1  4.1 3.9  3.9   CL 90* 91* 92*   CO2 43* 39* 39*   BUN 23 28* 29*   CREATININE 0.8 0.8 0.9   GLUCOSE 131* 120* 86   CALCIUM 8.4* 8.5* 8.4*       Recent Labs     06/15/20  0535 06/16/20  0505 06/17/20  0415   WBC 13.8* 17.9*

## 2020-06-18 ENCOUNTER — APPOINTMENT (OUTPATIENT)
Dept: GENERAL RADIOLOGY | Age: 60
DRG: 130 | End: 2020-06-18
Attending: INTERNAL MEDICINE
Payer: MEDICAID

## 2020-06-18 LAB
AADO2: 120.6 MMHG
ANION GAP SERPL CALCULATED.3IONS-SCNC: 5 MMOL/L (ref 7–16)
B.E.: 9.5 MMOL/L (ref -3–3)
BASOPHILS ABSOLUTE: 0.01 E9/L (ref 0–0.2)
BASOPHILS RELATIVE PERCENT: 0.1 % (ref 0–2)
BUN BLDV-MCNC: 26 MG/DL (ref 8–23)
CALCIUM SERPL-MCNC: 8.3 MG/DL (ref 8.6–10.2)
CHLORIDE BLD-SCNC: 97 MMOL/L (ref 98–107)
CO2: 36 MMOL/L (ref 22–29)
COHB: 0.4 % (ref 0–1.5)
CREAT SERPL-MCNC: 0.7 MG/DL (ref 0.7–1.2)
CRITICAL: ABNORMAL
DATE ANALYZED: ABNORMAL
DATE OF COLLECTION: ABNORMAL
EKG ATRIAL RATE: 75 BPM
EKG P AXIS: 83 DEGREES
EKG P-R INTERVAL: 126 MS
EKG Q-T INTERVAL: 356 MS
EKG QRS DURATION: 90 MS
EKG QTC CALCULATION (BAZETT): 397 MS
EKG R AXIS: 67 DEGREES
EKG T AXIS: 61 DEGREES
EKG VENTRICULAR RATE: 75 BPM
EOSINOPHILS ABSOLUTE: 0 E9/L (ref 0.05–0.5)
EOSINOPHILS RELATIVE PERCENT: 0 % (ref 0–6)
FIO2: 45 %
GFR AFRICAN AMERICAN: >60
GFR NON-AFRICAN AMERICAN: >60 ML/MIN/1.73
GLUCOSE BLD-MCNC: 162 MG/DL (ref 74–99)
HCO3: 37.7 MMOL/L (ref 22–26)
HCT VFR BLD CALC: 31.8 % (ref 37–54)
HEMOGLOBIN: 9.5 G/DL (ref 12.5–16.5)
HHB: 2.6 % (ref 0–5)
IMMATURE GRANULOCYTES #: 0.09 E9/L
IMMATURE GRANULOCYTES %: 0.6 % (ref 0–5)
LAB: ABNORMAL
LYMPHOCYTES ABSOLUTE: 0.33 E9/L (ref 1.5–4)
LYMPHOCYTES RELATIVE PERCENT: 2.2 % (ref 20–42)
Lab: ABNORMAL
MAGNESIUM: 2.3 MG/DL (ref 1.6–2.6)
MCH RBC QN AUTO: 30.4 PG (ref 26–35)
MCHC RBC AUTO-ENTMCNC: 29.9 % (ref 32–34.5)
MCV RBC AUTO: 101.9 FL (ref 80–99.9)
METHB: 0.2 % (ref 0–1.5)
MODE: AC
MONOCYTES ABSOLUTE: 0.84 E9/L (ref 0.1–0.95)
MONOCYTES RELATIVE PERCENT: 5.5 % (ref 2–12)
NEUTROPHILS ABSOLUTE: 14.03 E9/L (ref 1.8–7.3)
NEUTROPHILS RELATIVE PERCENT: 91.6 % (ref 43–80)
O2 CONTENT: 14.7 ML/DL
O2 SATURATION: 97.4 % (ref 92–98.5)
O2HB: 96.8 % (ref 94–97)
OPERATOR ID: 1358
PATIENT TEMP: 37 C
PCO2: 73.8 MMHG (ref 35–45)
PDW BLD-RTO: 12.8 FL (ref 11.5–15)
PEEP/CPAP: 5 CMH2O
PFO2: 2.34 MMHG/%
PH BLOOD GAS: 7.33 (ref 7.35–7.45)
PHOSPHORUS: 4.4 MG/DL (ref 2.5–4.5)
PLATELET # BLD: 238 E9/L (ref 130–450)
PMV BLD AUTO: 9.7 FL (ref 7–12)
PO2: 105.1 MMHG (ref 75–100)
POTASSIUM SERPL-SCNC: 4.6 MMOL/L (ref 3.5–5)
RBC # BLD: 3.12 E12/L (ref 3.8–5.8)
RBC # BLD: NORMAL 10*6/UL
RI(T): 115 %
RR MECHANICAL: 16 B/MIN
SODIUM BLD-SCNC: 138 MMOL/L (ref 132–146)
SOURCE, BLOOD GAS: ABNORMAL
THB: 10.7 G/DL (ref 11.5–16.5)
TIME ANALYZED: 437
VT MECHANICAL: 480 ML
WBC # BLD: 15.3 E9/L (ref 4.5–11.5)

## 2020-06-18 PROCEDURE — 6370000000 HC RX 637 (ALT 250 FOR IP): Performed by: INTERNAL MEDICINE

## 2020-06-18 PROCEDURE — 80048 BASIC METABOLIC PNL TOTAL CA: CPT

## 2020-06-18 PROCEDURE — 2580000003 HC RX 258

## 2020-06-18 PROCEDURE — 82805 BLOOD GASES W/O2 SATURATION: CPT

## 2020-06-18 PROCEDURE — 94640 AIRWAY INHALATION TREATMENT: CPT

## 2020-06-18 PROCEDURE — 83735 ASSAY OF MAGNESIUM: CPT

## 2020-06-18 PROCEDURE — 85025 COMPLETE CBC W/AUTO DIFF WBC: CPT

## 2020-06-18 PROCEDURE — 2580000003 HC RX 258: Performed by: INTERNAL MEDICINE

## 2020-06-18 PROCEDURE — 6360000002 HC RX W HCPCS: Performed by: INTERNAL MEDICINE

## 2020-06-18 PROCEDURE — 2500000003 HC RX 250 WO HCPCS: Performed by: INTERNAL MEDICINE

## 2020-06-18 PROCEDURE — 71045 X-RAY EXAM CHEST 1 VIEW: CPT

## 2020-06-18 PROCEDURE — 36415 COLL VENOUS BLD VENIPUNCTURE: CPT

## 2020-06-18 PROCEDURE — 36592 COLLECT BLOOD FROM PICC: CPT

## 2020-06-18 PROCEDURE — 84100 ASSAY OF PHOSPHORUS: CPT

## 2020-06-18 PROCEDURE — 2000000000 HC ICU R&B

## 2020-06-18 PROCEDURE — 93010 ELECTROCARDIOGRAM REPORT: CPT | Performed by: INTERNAL MEDICINE

## 2020-06-18 PROCEDURE — 99233 SBSQ HOSP IP/OBS HIGH 50: CPT | Performed by: INTERNAL MEDICINE

## 2020-06-18 PROCEDURE — 94003 VENT MGMT INPAT SUBQ DAY: CPT

## 2020-06-18 PROCEDURE — C9113 INJ PANTOPRAZOLE SODIUM, VIA: HCPCS | Performed by: INTERNAL MEDICINE

## 2020-06-18 PROCEDURE — 36600 WITHDRAWAL OF ARTERIAL BLOOD: CPT

## 2020-06-18 RX ORDER — SODIUM CHLORIDE FOR INHALATION 3 %
4 VIAL, NEBULIZER (ML) INHALATION 4 TIMES DAILY
Status: DISCONTINUED | OUTPATIENT
Start: 2020-06-18 | End: 2020-06-25 | Stop reason: HOSPADM

## 2020-06-18 RX ORDER — SODIUM CHLORIDE FOR INHALATION 3 %
4 VIAL, NEBULIZER (ML) INHALATION EVERY 6 HOURS
Status: DISCONTINUED | OUTPATIENT
Start: 2020-06-18 | End: 2020-06-18

## 2020-06-18 RX ORDER — ALBUTEROL SULFATE 2.5 MG/3ML
2.5 SOLUTION RESPIRATORY (INHALATION) 4 TIMES DAILY
Status: DISCONTINUED | OUTPATIENT
Start: 2020-06-18 | End: 2020-06-25 | Stop reason: HOSPADM

## 2020-06-18 RX ADMIN — Medication 175 MCG/HR: at 01:01

## 2020-06-18 RX ADMIN — Medication 10 ML: at 09:13

## 2020-06-18 RX ADMIN — DEXMEDETOMIDINE HYDROCHLORIDE 1.2 MCG/KG/HR: 100 INJECTION, SOLUTION INTRAVENOUS at 18:35

## 2020-06-18 RX ADMIN — WATER 10 ML: 1 INJECTION INTRAMUSCULAR; INTRAVENOUS; SUBCUTANEOUS at 14:19

## 2020-06-18 RX ADMIN — ENOXAPARIN SODIUM 40 MG: 40 INJECTION SUBCUTANEOUS at 09:12

## 2020-06-18 RX ADMIN — IPRATROPIUM BROMIDE AND ALBUTEROL SULFATE 1 AMPULE: 2.5; .5 SOLUTION RESPIRATORY (INHALATION) at 08:01

## 2020-06-18 RX ADMIN — SODIUM CHLORIDE SOLN NEBU 3% 4 ML: 3 NEBU SOLN at 15:54

## 2020-06-18 RX ADMIN — DEXMEDETOMIDINE HYDROCHLORIDE 1 MCG/KG/HR: 100 INJECTION, SOLUTION INTRAVENOUS at 14:54

## 2020-06-18 RX ADMIN — METHYLPREDNISOLONE SODIUM SUCCINATE 40 MG: 40 INJECTION, POWDER, LYOPHILIZED, FOR SOLUTION INTRAMUSCULAR; INTRAVENOUS at 20:36

## 2020-06-18 RX ADMIN — METHYLPREDNISOLONE SODIUM SUCCINATE 40 MG: 40 INJECTION, POWDER, LYOPHILIZED, FOR SOLUTION INTRAMUSCULAR; INTRAVENOUS at 06:33

## 2020-06-18 RX ADMIN — DEXMEDETOMIDINE HYDROCHLORIDE 0.8 MCG/KG/HR: 100 INJECTION, SOLUTION INTRAVENOUS at 10:03

## 2020-06-18 RX ADMIN — SODIUM CHLORIDE SOLN NEBU 3% 4 ML: 3 NEBU SOLN at 19:54

## 2020-06-18 RX ADMIN — ALBUTEROL SULFATE 2.5 MG: 2.5 SOLUTION RESPIRATORY (INHALATION) at 19:54

## 2020-06-18 RX ADMIN — BUDESONIDE 500 MCG: 0.5 SUSPENSION RESPIRATORY (INHALATION) at 08:03

## 2020-06-18 RX ADMIN — SODIUM CHLORIDE, PRESERVATIVE FREE 300 UNITS: 5 INJECTION INTRAVENOUS at 20:24

## 2020-06-18 RX ADMIN — SODIUM CHLORIDE, PRESERVATIVE FREE 300 UNITS: 5 INJECTION INTRAVENOUS at 09:20

## 2020-06-18 RX ADMIN — BUDESONIDE 500 MCG: 0.5 SUSPENSION RESPIRATORY (INHALATION) at 19:54

## 2020-06-18 RX ADMIN — DEXMEDETOMIDINE HYDROCHLORIDE 0.9 MCG/KG/HR: 100 INJECTION, SOLUTION INTRAVENOUS at 01:51

## 2020-06-18 RX ADMIN — Medication 10 ML: at 20:00

## 2020-06-18 RX ADMIN — SODIUM CHLORIDE SOLN NEBU 3% 4 ML: 3 NEBU SOLN at 13:10

## 2020-06-18 RX ADMIN — Medication 175 MCG/HR: at 06:50

## 2020-06-18 RX ADMIN — WATER 1 ML: 1 INJECTION INTRAMUSCULAR; INTRAVENOUS; SUBCUTANEOUS at 20:36

## 2020-06-18 RX ADMIN — ALBUTEROL SULFATE 2.5 MG: 2.5 SOLUTION RESPIRATORY (INHALATION) at 15:54

## 2020-06-18 RX ADMIN — PANTOPRAZOLE SODIUM 40 MG: 40 INJECTION, POWDER, FOR SOLUTION INTRAVENOUS at 09:12

## 2020-06-18 RX ADMIN — CHLORHEXIDINE GLUCONATE 0.12% ORAL RINSE 15 ML: 1.2 LIQUID ORAL at 09:12

## 2020-06-18 RX ADMIN — ARFORMOTEROL TARTRATE 15 MCG: 15 SOLUTION RESPIRATORY (INHALATION) at 08:02

## 2020-06-18 RX ADMIN — METHYLPREDNISOLONE SODIUM SUCCINATE 40 MG: 40 INJECTION, POWDER, LYOPHILIZED, FOR SOLUTION INTRAMUSCULAR; INTRAVENOUS at 14:19

## 2020-06-18 RX ADMIN — CHLORHEXIDINE GLUCONATE 0.12% ORAL RINSE 15 ML: 1.2 LIQUID ORAL at 20:00

## 2020-06-18 RX ADMIN — DEXMEDETOMIDINE HYDROCHLORIDE 0.9 MCG/KG/HR: 100 INJECTION, SOLUTION INTRAVENOUS at 05:42

## 2020-06-18 RX ADMIN — Medication 175 MCG/HR: at 03:52

## 2020-06-18 RX ADMIN — SODIUM CHLORIDE, PRESERVATIVE FREE 10 ML: 5 INJECTION INTRAVENOUS at 09:13

## 2020-06-18 RX ADMIN — ALBUTEROL SULFATE 2.5 MG: 2.5 SOLUTION RESPIRATORY (INHALATION) at 13:10

## 2020-06-18 RX ADMIN — ARFORMOTEROL TARTRATE 15 MCG: 15 SOLUTION RESPIRATORY (INHALATION) at 19:54

## 2020-06-18 RX ADMIN — DEXMEDETOMIDINE HYDROCHLORIDE 1.4 MCG/KG/HR: 100 INJECTION, SOLUTION INTRAVENOUS at 21:00

## 2020-06-18 ASSESSMENT — PAIN SCALES - GENERAL
PAINLEVEL_OUTOF10: 0

## 2020-06-18 ASSESSMENT — PULMONARY FUNCTION TESTS
PIF_VALUE: 40
PIF_VALUE: 11
PIF_VALUE: 16
PIF_VALUE: 14
PIF_VALUE: 40
PIF_VALUE: 37
PIF_VALUE: 44
PIF_VALUE: 28
PIF_VALUE: 41
PIF_VALUE: 25
PIF_VALUE: 46
PIF_VALUE: 26
PIF_VALUE: 48
PIF_VALUE: 39
PIF_VALUE: 35
PIF_VALUE: 34
PIF_VALUE: 47
PIF_VALUE: 30
PIF_VALUE: 52
PIF_VALUE: 46
PIF_VALUE: 43
PIF_VALUE: 45
PIF_VALUE: 44
PIF_VALUE: 26
PIF_VALUE: 34

## 2020-06-18 NOTE — PROGRESS NOTES
Critical Care Team - Daily Progress Note      Date and time: 6/18/2020 8:32 AM  Patient's name:  Radha Rogers  Medical Record Number: [id number]  Patient's account/billing number: [de-identified]  Patient's YOB: 1960  Age: 60 y.o. Date of Admission: 6/14/2020 10:52 PM  Length of stay during current admission: 4       Primary Care Physician: Kate Suero DO  ICU Attending Physician: Dr. Deepika Foster    Code Status: Full Code    Reason for ICU admission: Acute hypercapnic respiratory failure      SUBJECTIVE:     OVERNIGHT EVENTS:       None. Remains intubated. Awake and following commands.       AWAKE & FOLLOWING COMMANDS:  [] No   [x] Yes    CURRENT VENTILATION STATUS:     [x] Ventilator  [] BIPAP  [] Nasal Cannula [] Room Air      IF INTUBATED, ET TUBE MARKING AT LOWER LIP:       cms    SECRETIONS Amount:  [] Small [x] Moderate  [] Large  [] None  Color:     [] White [x] Colored  [] Bloody     *Secretions are thick and foul-smelling     SEDATION:  RAAS Score: -1   [] Propofol gtt  [] Versed gtt  [] Ativan gtt   [] No Sedation       Fentanyl gtt      Precedex gtt on 6/17    PARALYZED:  [x] No    [] Yes    DIARRHEA:                [x] No                [] Yes  (C. Difficile status: [] positive                                                                                                                       [] negative                                                                                                                     [] pending)    VASOPRESSORS:  [x] No    [] Yes    If yes -   [] Levophed       [] Dopamine     [] Vasopressin       [] Dobutamine  [] Phenylephrine         [] Epinephrine    CENTRAL LINES:     [] No   [x] Yes   (Date of Insertion: 6/14   )           If yes -     [] Right IJ     [] Left IJ [] Right Femoral [x] Left Femoral                   [] Right Subclavian [] Left Subclavian        CUEVAS'S CATHETER:   [] No   [x] Yes  (Date of Insertion: 6/14  )     URINE OUTPUT: heparin flush  3 mL Intravenous Q12H    sodium chloride flush  10 mL Intravenous 2 times per day    enoxaparin  40 mg Subcutaneous Daily    Arformoterol Tartrate  15 mcg Nebulization BID    budesonide  0.5 mg Nebulization BID    chlorhexidine  15 mL Mouth/Throat BID    pantoprazole  40 mg Intravenous Daily    And    sodium chloride (PF)  10 mL Intravenous Daily     Continuous Infusions:   dexmedetomidine (PRECEDEX) IV infusion 0.9 mcg/kg/hr (06/18/20 0542)    fentaNYL 5 mcg/ml in 0.9%  ml infusion 175 mcg/hr (06/18/20 0650)    propofol Stopped (06/17/20 1125)     PRN Meds:   heparin flush, 3 mL, PRN  sodium chloride flush, 10 mL, PRN  potassium chloride, 40 mEq, PRN    Or  potassium alternative oral replacement, 40 mEq, PRN    Or  potassium chloride, 10 mEq, PRN  magnesium sulfate, 1 g, PRN  acetaminophen, 650 mg, Q6H PRN    Or  acetaminophen, 650 mg, Q6H PRN  magnesium hydroxide, 30 mL, Daily PRN  promethazine, 12.5 mg, Q6H PRN    Or  ondansetron, 4 mg, Q6H PRN  albuterol, 2.5 mg, Q6H PRN  ipratropium-albuterol, 1 ampule, Q4H PRN          VENT SETTINGS (Comprehensive) (if applicable):  Vent Information  $Ventilation: $Subsequent Day  Vent Type: 840  Vent Mode: AC/VC  Vt Ordered: 480 mL  Rate Set: 16 bmp  Peak Flow: 75 L/min  Pressure Support: 0 cmH20  FiO2 : 45 %  SpO2: 98 %  SpO2/FiO2 ratio: 217.78  Sensitivity: 2  PEEP/CPAP: 5  I Time/ I Time %: 0 s  Humidification Source: HME  Additional Respiratory  Assessments  Pulse: 62  Resp: 15  SpO2: 98 %  Position: Semi-Villatoro's  Humidification Source: HME  Oral Care: Mouth moisturizer, Mouth suctioned  Subglottic Suction Done?: Yes  Airway Type: ET  Airway Size: 8  Cuff Pressure (cm H2O): 29 cm H2O    ABGs: 6/16 pH 7.474 CO2 59.1 O2 58.1 HCO3 42.4  6/18 pH 7.326 CO2 73.8 O2 105.1 HCO3 37.7    Laboratory findings:    Complete Blood Count:   Recent Labs     06/16/20  0505 06/17/20  0415 06/18/20  0425   WBC 17.9* 14.4* 15.3*   HGB 9.6* 9.1* 9.5*   HCT 30.6*

## 2020-06-18 NOTE — PROGRESS NOTES
Department of Internal Medicine  Division of Pulmonary, Critical Care & Sleep Medicine  Pulmonary [address] [facility]                                             Pulmonary Progress Note follow up       CC SOB   Patient on Vent     HISTORY OF PRESENT ILLNESS:    Fail weaning and PSV trial today   Agitated when off sedation   No fever or chills   Still on Vent      PHYSICAL EXAMINATION:  Vitals:    06/17/20 2100   BP: 109/69   Pulse: 73   Resp: 14   Temp:    SpO2: 97%     Constitutional: This is a well developed, well nourished 60y.o. year old male who is alert, oriented, cooperative and in no apparent distress. Head was normocephalic and atraumatic. EENT: Mallampati class :  Extraocular muscles intact. External canals are patent and no discharge was appreciated. Septum was midline,   mucosa was without erythema, exudates or cobblestoning. No thrush was noted. Neck: Supple without thyromegaly. No elevated JVP. Trachea was midline. No carotid bruits were auscultated. Respiratory: Rhonchi bilateral    Cardiovascular: Regular without murmur, clicks, gallops or rubs. There is no left or right ventricular heave. Pulses:  Carotid, radial and femoral pulses were equally bilaterally. Abdomen: Slightly rounded and soft without organomegaly. No rebound, rigidity or guarding was appreciated. Lymphatic: No lymphadenopathy. Musculoskeletal: No joint deformity  Extremities: Mild swelling bilaterally +1 edema  Skin:  Warm and dry. Good color, turgor and pigmentation. No lesions or scars. Neurological/Psychiatric: The patient's general behavior, level of consciousness, thought content and emotional status is normal.  Cranial nerves II-XII are intact. DATA:     FVC 1.37 which is 50% of predicted   FEV1 0.62 which is 17% of predicted    FEV1/FVC 45 which is 57 of predicted with lower limit of normal 68     MVV 22 which is 14% of predicted   impression very severe obstruction  DLCO 38 as.   Last

## 2020-06-19 ENCOUNTER — APPOINTMENT (OUTPATIENT)
Dept: GENERAL RADIOLOGY | Age: 60
DRG: 130 | End: 2020-06-19
Attending: INTERNAL MEDICINE
Payer: MEDICAID

## 2020-06-19 LAB
AADO2: 100.6 MMHG
ANION GAP SERPL CALCULATED.3IONS-SCNC: 5 MMOL/L (ref 7–16)
B.E.: 11.8 MMOL/L (ref -3–3)
BASOPHILS ABSOLUTE: 0.01 E9/L (ref 0–0.2)
BASOPHILS RELATIVE PERCENT: 0.1 % (ref 0–2)
BLOOD CULTURE, ROUTINE: NORMAL
BUN BLDV-MCNC: 29 MG/DL (ref 8–23)
CALCIUM SERPL-MCNC: 8.7 MG/DL (ref 8.6–10.2)
CHLORIDE BLD-SCNC: 96 MMOL/L (ref 98–107)
CO2: 36 MMOL/L (ref 22–29)
COHB: 0.1 % (ref 0–1.5)
CREAT SERPL-MCNC: 0.6 MG/DL (ref 0.7–1.2)
CRITICAL: ABNORMAL
CULTURE, BLOOD 2: NORMAL
CULTURE, RESPIRATORY: ABNORMAL
DATE ANALYZED: ABNORMAL
DATE OF COLLECTION: ABNORMAL
EOSINOPHILS ABSOLUTE: 0 E9/L (ref 0.05–0.5)
EOSINOPHILS RELATIVE PERCENT: 0 % (ref 0–6)
FIO2: 40 %
GFR AFRICAN AMERICAN: >60
GFR NON-AFRICAN AMERICAN: >60 ML/MIN/1.73
GLUCOSE BLD-MCNC: 146 MG/DL (ref 74–99)
HCO3: 39 MMOL/L (ref 22–26)
HCT VFR BLD CALC: 31.2 % (ref 37–54)
HEMOGLOBIN: 9.6 G/DL (ref 12.5–16.5)
HHB: 2.8 % (ref 0–5)
IMMATURE GRANULOCYTES #: 0.1 E9/L
IMMATURE GRANULOCYTES %: 0.6 % (ref 0–5)
LAB: ABNORMAL
LYMPHOCYTES ABSOLUTE: 0.43 E9/L (ref 1.5–4)
LYMPHOCYTES RELATIVE PERCENT: 2.5 % (ref 20–42)
Lab: ABNORMAL
MAGNESIUM: 2.4 MG/DL (ref 1.6–2.6)
MCH RBC QN AUTO: 30.7 PG (ref 26–35)
MCHC RBC AUTO-ENTMCNC: 30.8 % (ref 32–34.5)
MCV RBC AUTO: 99.7 FL (ref 80–99.9)
METHB: 0.3 % (ref 0–1.5)
MODE: AC
MONOCYTES ABSOLUTE: 1.79 E9/L (ref 0.1–0.95)
MONOCYTES RELATIVE PERCENT: 10.4 % (ref 2–12)
NEUTROPHILS ABSOLUTE: 14.95 E9/L (ref 1.8–7.3)
NEUTROPHILS RELATIVE PERCENT: 86.4 % (ref 43–80)
O2 CONTENT: 14.8 ML/DL
O2 SATURATION: 97.2 % (ref 92–98.5)
O2HB: 96.8 % (ref 94–97)
OPERATOR ID: ABNORMAL
ORGANISM: ABNORMAL
PATIENT TEMP: 37 C
PCO2: 66.4 MMHG (ref 35–45)
PDW BLD-RTO: 12.7 FL (ref 11.5–15)
PEEP/CPAP: 5 CMH2O
PFO2: 2.45 MMHG/%
PH BLOOD GAS: 7.39 (ref 7.35–7.45)
PHOSPHORUS: 3.5 MG/DL (ref 2.5–4.5)
PLATELET # BLD: 282 E9/L (ref 130–450)
PMV BLD AUTO: 10.9 FL (ref 7–12)
PO2: 98.2 MMHG (ref 75–100)
POTASSIUM SERPL-SCNC: 4.7 MMOL/L (ref 3.5–5)
RBC # BLD: 3.13 E12/L (ref 3.8–5.8)
RBC # BLD: NORMAL 10*6/UL
RI(T): 102 %
RR MECHANICAL: 16 B/MIN
SMEAR, RESPIRATORY: ABNORMAL
SODIUM BLD-SCNC: 137 MMOL/L (ref 132–146)
SOURCE, BLOOD GAS: ABNORMAL
THB: 10.8 G/DL (ref 11.5–16.5)
TIME ANALYZED: 606
VT MECHANICAL: 480 ML
WBC # BLD: 17.3 E9/L (ref 4.5–11.5)

## 2020-06-19 PROCEDURE — 82805 BLOOD GASES W/O2 SATURATION: CPT

## 2020-06-19 PROCEDURE — 2000000000 HC ICU R&B

## 2020-06-19 PROCEDURE — 6370000000 HC RX 637 (ALT 250 FOR IP): Performed by: INTERNAL MEDICINE

## 2020-06-19 PROCEDURE — 2580000003 HC RX 258: Performed by: INTERNAL MEDICINE

## 2020-06-19 PROCEDURE — 99233 SBSQ HOSP IP/OBS HIGH 50: CPT | Performed by: INTERNAL MEDICINE

## 2020-06-19 PROCEDURE — 6360000002 HC RX W HCPCS: Performed by: INTERNAL MEDICINE

## 2020-06-19 PROCEDURE — 94003 VENT MGMT INPAT SUBQ DAY: CPT

## 2020-06-19 PROCEDURE — 6360000002 HC RX W HCPCS: Performed by: FAMILY MEDICINE

## 2020-06-19 PROCEDURE — 94640 AIRWAY INHALATION TREATMENT: CPT

## 2020-06-19 PROCEDURE — 2580000003 HC RX 258: Performed by: FAMILY MEDICINE

## 2020-06-19 PROCEDURE — 36415 COLL VENOUS BLD VENIPUNCTURE: CPT

## 2020-06-19 PROCEDURE — 83735 ASSAY OF MAGNESIUM: CPT

## 2020-06-19 PROCEDURE — 84100 ASSAY OF PHOSPHORUS: CPT

## 2020-06-19 PROCEDURE — 2500000003 HC RX 250 WO HCPCS: Performed by: INTERNAL MEDICINE

## 2020-06-19 PROCEDURE — 71045 X-RAY EXAM CHEST 1 VIEW: CPT

## 2020-06-19 PROCEDURE — C9113 INJ PANTOPRAZOLE SODIUM, VIA: HCPCS | Performed by: INTERNAL MEDICINE

## 2020-06-19 PROCEDURE — 85025 COMPLETE CBC W/AUTO DIFF WBC: CPT

## 2020-06-19 PROCEDURE — 80048 BASIC METABOLIC PNL TOTAL CA: CPT

## 2020-06-19 PROCEDURE — 36600 WITHDRAWAL OF ARTERIAL BLOOD: CPT

## 2020-06-19 PROCEDURE — 2580000003 HC RX 258

## 2020-06-19 RX ORDER — SODIUM CHLORIDE 0.9 % (FLUSH) 0.9 %
10 SYRINGE (ML) INJECTION PRN
Status: DISCONTINUED | OUTPATIENT
Start: 2020-06-19 | End: 2020-06-25 | Stop reason: HOSPADM

## 2020-06-19 RX ORDER — LIDOCAINE HYDROCHLORIDE 10 MG/ML
5 INJECTION, SOLUTION EPIDURAL; INFILTRATION; INTRACAUDAL; PERINEURAL ONCE
Status: DISCONTINUED | OUTPATIENT
Start: 2020-06-19 | End: 2020-06-22

## 2020-06-19 RX ORDER — HEPARIN SODIUM (PORCINE) LOCK FLUSH IV SOLN 100 UNIT/ML 100 UNIT/ML
3 SOLUTION INTRAVENOUS PRN
Status: DISCONTINUED | OUTPATIENT
Start: 2020-06-19 | End: 2020-06-20

## 2020-06-19 RX ORDER — HEPARIN SODIUM (PORCINE) LOCK FLUSH IV SOLN 100 UNIT/ML 100 UNIT/ML
3 SOLUTION INTRAVENOUS EVERY 12 HOURS SCHEDULED
Status: DISCONTINUED | OUTPATIENT
Start: 2020-06-19 | End: 2020-06-20

## 2020-06-19 RX ADMIN — Medication 10 ML: at 20:32

## 2020-06-19 RX ADMIN — SODIUM CHLORIDE, PRESERVATIVE FREE 10 ML: 5 INJECTION INTRAVENOUS at 08:28

## 2020-06-19 RX ADMIN — METHYLPREDNISOLONE SODIUM SUCCINATE 40 MG: 40 INJECTION, POWDER, LYOPHILIZED, FOR SOLUTION INTRAMUSCULAR; INTRAVENOUS at 23:15

## 2020-06-19 RX ADMIN — DEXMEDETOMIDINE HYDROCHLORIDE 1.3 MCG/KG/HR: 100 INJECTION, SOLUTION INTRAVENOUS at 09:22

## 2020-06-19 RX ADMIN — DEXMEDETOMIDINE HYDROCHLORIDE 1.3 MCG/KG/HR: 100 INJECTION, SOLUTION INTRAVENOUS at 12:36

## 2020-06-19 RX ADMIN — ARFORMOTEROL TARTRATE 15 MCG: 15 SOLUTION RESPIRATORY (INHALATION) at 07:26

## 2020-06-19 RX ADMIN — ACETAMINOPHEN 650 MG: 325 TABLET ORAL at 20:31

## 2020-06-19 RX ADMIN — Medication 300 UNITS: at 20:39

## 2020-06-19 RX ADMIN — SODIUM CHLORIDE SOLN NEBU 3% 4 ML: 3 NEBU SOLN at 11:11

## 2020-06-19 RX ADMIN — SODIUM CHLORIDE, PRESERVATIVE FREE 300 UNITS: 5 INJECTION INTRAVENOUS at 20:39

## 2020-06-19 RX ADMIN — WATER 1 G: 1 INJECTION INTRAMUSCULAR; INTRAVENOUS; SUBCUTANEOUS at 09:14

## 2020-06-19 RX ADMIN — SODIUM CHLORIDE SOLN NEBU 3% 4 ML: 3 NEBU SOLN at 07:26

## 2020-06-19 RX ADMIN — ARFORMOTEROL TARTRATE 15 MCG: 15 SOLUTION RESPIRATORY (INHALATION) at 20:15

## 2020-06-19 RX ADMIN — ALBUTEROL SULFATE 2.5 MG: 2.5 SOLUTION RESPIRATORY (INHALATION) at 11:11

## 2020-06-19 RX ADMIN — Medication 10 ML: at 08:28

## 2020-06-19 RX ADMIN — DEXMEDETOMIDINE HYDROCHLORIDE 1.4 MCG/KG/HR: 100 INJECTION, SOLUTION INTRAVENOUS at 21:50

## 2020-06-19 RX ADMIN — CHLORHEXIDINE GLUCONATE 0.12% ORAL RINSE 15 ML: 1.2 LIQUID ORAL at 20:32

## 2020-06-19 RX ADMIN — METHYLPREDNISOLONE SODIUM SUCCINATE 40 MG: 40 INJECTION, POWDER, LYOPHILIZED, FOR SOLUTION INTRAMUSCULAR; INTRAVENOUS at 14:39

## 2020-06-19 RX ADMIN — DEXMEDETOMIDINE HYDROCHLORIDE 1.3 MCG/KG/HR: 100 INJECTION, SOLUTION INTRAVENOUS at 15:27

## 2020-06-19 RX ADMIN — DEXMEDETOMIDINE HYDROCHLORIDE 1.3 MCG/KG/HR: 100 INJECTION, SOLUTION INTRAVENOUS at 12:07

## 2020-06-19 RX ADMIN — ALBUTEROL SULFATE 2.5 MG: 2.5 SOLUTION RESPIRATORY (INHALATION) at 20:00

## 2020-06-19 RX ADMIN — CHLORHEXIDINE GLUCONATE 0.12% ORAL RINSE 15 ML: 1.2 LIQUID ORAL at 08:28

## 2020-06-19 RX ADMIN — WATER 1 G: 1 INJECTION INTRAMUSCULAR; INTRAVENOUS; SUBCUTANEOUS at 17:27

## 2020-06-19 RX ADMIN — PANTOPRAZOLE SODIUM 40 MG: 40 INJECTION, POWDER, FOR SOLUTION INTRAVENOUS at 08:28

## 2020-06-19 RX ADMIN — DEXMEDETOMIDINE HYDROCHLORIDE 1.4 MCG/KG/HR: 100 INJECTION, SOLUTION INTRAVENOUS at 00:19

## 2020-06-19 RX ADMIN — METHYLPREDNISOLONE SODIUM SUCCINATE 40 MG: 40 INJECTION, POWDER, LYOPHILIZED, FOR SOLUTION INTRAMUSCULAR; INTRAVENOUS at 05:25

## 2020-06-19 RX ADMIN — WATER 1 ML: 1 INJECTION INTRAMUSCULAR; INTRAVENOUS; SUBCUTANEOUS at 05:25

## 2020-06-19 RX ADMIN — DEXMEDETOMIDINE HYDROCHLORIDE 1.4 MCG/KG/HR: 100 INJECTION, SOLUTION INTRAVENOUS at 02:58

## 2020-06-19 RX ADMIN — SODIUM CHLORIDE SOLN NEBU 3% 4 ML: 3 NEBU SOLN at 20:00

## 2020-06-19 RX ADMIN — SODIUM CHLORIDE SOLN NEBU 3% 4 ML: 3 NEBU SOLN at 15:31

## 2020-06-19 RX ADMIN — SODIUM CHLORIDE, PRESERVATIVE FREE 300 UNITS: 5 INJECTION INTRAVENOUS at 09:23

## 2020-06-19 RX ADMIN — BUDESONIDE 500 MCG: 0.5 SUSPENSION RESPIRATORY (INHALATION) at 07:26

## 2020-06-19 RX ADMIN — ENOXAPARIN SODIUM 40 MG: 40 INJECTION SUBCUTANEOUS at 08:28

## 2020-06-19 RX ADMIN — DEXMEDETOMIDINE HYDROCHLORIDE 1.4 MCG/KG/HR: 100 INJECTION, SOLUTION INTRAVENOUS at 18:27

## 2020-06-19 RX ADMIN — BUDESONIDE 500 MCG: 0.5 SUSPENSION RESPIRATORY (INHALATION) at 20:15

## 2020-06-19 RX ADMIN — ALBUTEROL SULFATE 2.5 MG: 2.5 SOLUTION RESPIRATORY (INHALATION) at 15:31

## 2020-06-19 RX ADMIN — DEXMEDETOMIDINE HYDROCHLORIDE 1.4 MCG/KG/HR: 100 INJECTION, SOLUTION INTRAVENOUS at 05:34

## 2020-06-19 RX ADMIN — ALBUTEROL SULFATE 2.5 MG: 2.5 SOLUTION RESPIRATORY (INHALATION) at 07:26

## 2020-06-19 ASSESSMENT — PAIN SCALES - GENERAL
PAINLEVEL_OUTOF10: 0

## 2020-06-19 ASSESSMENT — PULMONARY FUNCTION TESTS
PIF_VALUE: 38
PIF_VALUE: 14
PIF_VALUE: 35
PIF_VALUE: 30
PIF_VALUE: 26
PIF_VALUE: 29
PIF_VALUE: 31
PIF_VALUE: 16
PIF_VALUE: 14
PIF_VALUE: 16
PIF_VALUE: 28
PIF_VALUE: 35
PIF_VALUE: 37
PIF_VALUE: 14
PIF_VALUE: 27
PIF_VALUE: 42
PIF_VALUE: 14
PIF_VALUE: 27
PIF_VALUE: 20
PIF_VALUE: 14
PIF_VALUE: 21
PIF_VALUE: 33
PIF_VALUE: 38
PIF_VALUE: 40
PIF_VALUE: 27
PIF_VALUE: 31
PIF_VALUE: 41
PIF_VALUE: 23
PIF_VALUE: 32

## 2020-06-19 NOTE — PLAN OF CARE
Problem: Falls - Risk of:  Goal: Will remain free from falls  Description: Will remain free from falls  Outcome: Met This Shift  Goal: Absence of physical injury  Description: Absence of physical injury  Outcome: Met This Shift     Problem: Airway Clearance - Ineffective:  Goal: Ability to maintain a clear airway will improve  Description: Ability to maintain a clear airway will improve  Outcome: Not Met This Shift     Problem: Anxiety/Stress:  Goal: Level of anxiety will decrease  Description: Level of anxiety will decrease  Outcome: Met This Shift     Problem: Aspiration:  Goal: Absence of aspiration  Description: Absence of aspiration  Outcome: Met This Shift     Problem: Fluid Volume - Imbalance:  Goal: Absence of imbalanced fluid volume signs and symptoms  Description: Absence of imbalanced fluid volume signs and symptoms  Outcome: Ongoing     Problem: Gas Exchange - Impaired:  Goal: Levels of oxygenation will improve  Description: Levels of oxygenation will improve  Outcome: Met This Shift     Problem: Mental Status - Impaired:  Goal: Mental status will be restored to baseline  Description: Mental status will be restored to baseline  Outcome: Not Met This Shift     Problem: Pain:  Goal: Pain level will decrease  Description: Pain level will decrease  Outcome: Met This Shift  Goal: Recognizes and communicates pain  Description: Recognizes and communicates pain  Outcome: Met This Shift  Goal: Control of acute pain  Description: Control of acute pain  Outcome: Met This Shift  Goal: Control of chronic pain  Description: Control of chronic pain  Outcome: Met This Shift     Problem: Restraint Use - Nonviolent/Non-Self-Destructive Behavior:  Goal: Absence of restraint indications  Description: Absence of restraint indications  Outcome: Not Met This Shift  Goal: Absence of restraint-related injury  Description: Absence of restraint-related injury  Outcome: Met This Shift     Problem: Pain:  Goal: Pain level will decrease  Description: Pain level will decrease  Outcome: Met This Shift

## 2020-06-19 NOTE — PROGRESS NOTES
Low              [] Anuric      OBJECTIVE:     VITAL SIGNS:  /74   Pulse 82   Temp 98.9 °F (37.2 °C) (Axillary)   Resp 26   Ht 6' 2\" (1.88 m)   Wt 249 lb 1.9 oz (113 kg)   SpO2 100%   BMI 31.99 kg/m²   Tmax over 24 hours:  Temp (24hrs), Av.9 °F (37.2 °C), Min:98.6 °F (37 °C), Max:99.2 °F (37.3 °C)      Patient Vitals for the past 6 hrs:   BP Temp Temp src Pulse Resp SpO2 Weight   20 0700 121/74 -- -- 82 26 100 % --   20 0600 116/72 -- -- 70 18 -- --   20 0500 134/78 -- -- 72 15 -- --   20 0428 -- -- -- 81 20 100 % --   20 0400 (!) 148/74 98.9 °F (37.2 °C) Axillary 74 17 99 % 249 lb 1.9 oz (113 kg)   20 0300 114/67 -- -- 74 18 100 % --   20 0200 115/68 -- -- 54 15 100 % --         Intake/Output Summary (Last 24 hours) at 2020 0744  Last data filed at 2020 0700  Gross per 24 hour   Intake 2226 ml   Output 1690 ml   Net 536 ml     Wt Readings from Last 2 Encounters:   20 249 lb 1.9 oz (113 kg)   20 240 lb (108.9 kg)     Body mass index is 31.99 kg/m². PHYSICAL EXAMINATION:    CONSTITUTIONAL:  Intubated; Obese  EYES:  Lids and lashes normal, Pupils pinpoint, but equal and responsive  ENT:  normocephalic, without obvious abnormality, atraumatic  LUNGS:  Coarse breath sounds b/l, but no rales or wheezes appreciated; No increased work of breathing, good air movement noted throughout  CARDIOVASCULAR: RR with intermittent dropped beats, no m/g/r noted; normal S1/S2; normal apical impulse  ABDOMEN:  abd notable distended, but non-tender to palpation; no masses or hepatosplenomegally  GENITAL/URINARY:  Parra in place  MUSCULOSKELETAL:  there is no redness, warmth, or swelling of the joints  NEUROLOGIC:  Intubated. Alert and following commands.    SKIN:  no bruising or bleeding, normal skin color, texture, turgor and no redness, warmth, or swelling     Any additional physical findings:    MEDICATIONS:    Scheduled Meds:   albuterol  2.5 mg clinically    indicated. 4. Bilateral renal cysts. Low-attenuation poorly defined focus lower pole left    kidney is indeterminate and appears to have been present previously. Recommend    follow-up non urgent CT or MRI using renal protocol. 5. Left-sided femoral venous central catheter has its tip in the distal aspect    of the left common femoral vein. 6. Additional nonacute findings as described above. THIS REPORT CONTAINS FINDINGS THAT MAY BE CRITICAL TO PATIENT CARE. The    findings were verbally communicated via telephone conference with Daisha Georges    at 3:46 AM EDT on 6/15/2020. The findings were acknowledged and understood. COMMENTS:    Consistent with the [employer] of [employer]'s Incidental Findings    Committee white paper Joy Stinson Am Shirlene Radiol 2018): Any incidental renal lesion less    than 1.0 cm or classified as too small to characterize, or any incidental    cystic renal lesion characterized as simple-appearing, is likely benign. No    follow-up imaging is recommended for these lesions per consensus    recommendations based on imaging criteria. This report has been electronically signed by Amalia Clark MD.      XR CHEST PORTABLE   Final Result      Chronic obstructive pulmonary disease      Support lines in satisfactory position      Unchanged from prior study with no evidence of airspace consolidation   or pulmonary venous congestion. XR CHEST PORTABLE    (Results Pending)       Chest Xray (6/19/2020):    ASSESSMENT:     SYSTEMS ASSESSMENT     Neuro   Intubated  Awake and responsive to commands  On ventilator  Precedex - titrate as needed     Respiratory   Acute hypercapnic respiratory failure requiring mechanical ventilation  ABGs prn  6/19: Was able to tolerate pressure support yesterday, trial again today for extubation. If tolerating PS plan for afternoon extubation  Wean oxygen as tolerated.  Keep O2 sat 90-92%  Solumedrol 40mg q8h  Pulmicort 0.5 mg Neb AM    Critical Care Attending Addendum:    Patient seen and examined with the house staff. X-rays personally reviewed through the PACS. Family is updated at the bedside as available. Additional findings listed below as necessary. Additional comments:  1. Improving acute hypercapnic respiratory failure, tolerating PS 10 earlier today will try on PS 8 this PM. Still with copious secretions so not ready to extubate. 2. Polymicrobial acute bronchitis cefazolin started. 3. Will change to midline or PICC from CVL. 4. COPD exacerbation is improved but will continue steroids and nebs including hypertonic saline. 5. Agitation improved with Precedex.     >30 min CCT

## 2020-06-19 NOTE — PLAN OF CARE
Problem: Falls - Risk of:  Goal: Will remain free from falls  Description: Will remain free from falls  Outcome: Met This Shift  Goal: Absence of physical injury  Description: Absence of physical injury  Outcome: Met This Shift     Problem: Airway Clearance - Ineffective:  Goal: Ability to maintain a clear airway will improve  Description: Ability to maintain a clear airway will improve  Outcome: Met This Shift     Problem: Anxiety/Stress:  Goal: Level of anxiety will decrease  Description: Level of anxiety will decrease  Outcome: Met This Shift     Problem: Aspiration:  Goal: Absence of aspiration  Description: Absence of aspiration  Outcome: Met This Shift     Problem: Fluid Volume - Imbalance:  Goal: Absence of imbalanced fluid volume signs and symptoms  Description: Absence of imbalanced fluid volume signs and symptoms  Outcome: Met This Shift     Problem: Gas Exchange - Impaired:  Goal: Levels of oxygenation will improve  Description: Levels of oxygenation will improve  Outcome: Met This Shift     Problem: Mental Status - Impaired:  Goal: Mental status will be restored to baseline  Description: Mental status will be restored to baseline  Outcome: Met This Shift     Problem: Pain:  Goal: Pain level will decrease  Description: Pain level will decrease  Outcome: Met This Shift  Goal: Recognizes and communicates pain  Description: Recognizes and communicates pain  Outcome: Met This Shift  Goal: Control of acute pain  Description: Control of acute pain  Outcome: Met This Shift  Goal: Control of chronic pain  Description: Control of chronic pain  Outcome: Met This Shift     Problem: Restraint Use - Nonviolent/Non-Self-Destructive Behavior:  Goal: Absence of restraint indications  Description: Absence of restraint indications  Outcome: Met This Shift  Goal: Absence of restraint-related injury  Description: Absence of restraint-related injury  Outcome: Met This Shift     Problem: Pain:  Goal: Pain level will decrease  Description: Pain level will decrease  Outcome: Met This Shift  Goal: Control of acute pain  Description: Control of acute pain  Outcome: Met This Shift  Goal: Control of chronic pain  Description: Control of chronic pain  Outcome: Met This Shift     Problem: Inadequate oral food/beverage intake (NI-2.1)  Goal: Food and/or Nutrient Delivery  Description: Individualized approach for food/nutrient provision.   6/18/2020 1117 by Luis E Azevedo RD, CNSC, LD  Outcome: Met This Shift

## 2020-06-19 NOTE — PROGRESS NOTES
.  Intensive Care Daily Quality Rounding Checklist      ICU Team Members: Dr. Alxea Jones, Mary  [address] (residents), clinical pharmacist, charge nurse, bedside nurse, respiratory therapist    ICU Day #: NUMBER: 6    Intubation Date: June 14,2020    Ventilator Day #: NUMBER: 6    Central Line Insertion Date: June 14,2020        Day #: NUMBER: 6     Arterial Line Insertion Date:  N/A      Day #: N/A    DVT Prophylaxis: Lovenox    GI Prophylaxis: Protonix    Parra Catheter Insertion Date: June 14,2020       Day #: 6      Continued need (if yes, reason documented and discussed with physician): yes, strict I&O in critical patient    Skin Issues/ Wounds and ordered treatment discussed on rounds: no issues, SOS precautions    Goals/ Plans for the Day: Daily labs, wean vent as tolerated, soft wrist restraints to prevent pulling tubes, continue critical care management

## 2020-06-20 LAB
AADO2: 118.7 MMHG
ANION GAP SERPL CALCULATED.3IONS-SCNC: 4 MMOL/L (ref 7–16)
B.E.: 6.1 MMOL/L (ref -3–3)
BASOPHILS ABSOLUTE: 0.01 E9/L (ref 0–0.2)
BASOPHILS RELATIVE PERCENT: 0.1 % (ref 0–2)
BUN BLDV-MCNC: 33 MG/DL (ref 8–23)
CALCIUM SERPL-MCNC: 8.2 MG/DL (ref 8.6–10.2)
CHLORIDE BLD-SCNC: 97 MMOL/L (ref 98–107)
CO2: 38 MMOL/L (ref 22–29)
COHB: 0.3 % (ref 0–1.5)
CREAT SERPL-MCNC: 0.7 MG/DL (ref 0.7–1.2)
CRITICAL: ABNORMAL
DATE ANALYZED: ABNORMAL
DATE OF COLLECTION: ABNORMAL
EOSINOPHILS ABSOLUTE: 0 E9/L (ref 0.05–0.5)
EOSINOPHILS RELATIVE PERCENT: 0 % (ref 0–6)
FIO2: 40 %
GFR AFRICAN AMERICAN: >60
GFR NON-AFRICAN AMERICAN: >60 ML/MIN/1.73
GLUCOSE BLD-MCNC: 144 MG/DL (ref 74–99)
HCO3: 32.6 MMOL/L (ref 22–26)
HCT VFR BLD CALC: 32.1 % (ref 37–54)
HEMOGLOBIN: 9.7 G/DL (ref 12.5–16.5)
HHB: 4 % (ref 0–5)
HYPOCHROMIA: ABNORMAL
IMMATURE GRANULOCYTES #: 0.07 E9/L
IMMATURE GRANULOCYTES %: 0.4 % (ref 0–5)
LAB: ABNORMAL
LYMPHOCYTES ABSOLUTE: 0.34 E9/L (ref 1.5–4)
LYMPHOCYTES RELATIVE PERCENT: 1.8 % (ref 20–42)
Lab: ABNORMAL
MAGNESIUM: 3.1 MG/DL (ref 1.6–2.6)
MCH RBC QN AUTO: 30.9 PG (ref 26–35)
MCHC RBC AUTO-ENTMCNC: 30.2 % (ref 32–34.5)
MCV RBC AUTO: 102.2 FL (ref 80–99.9)
METHB: 0.3 % (ref 0–1.5)
MODE: ABNORMAL
MONOCYTES ABSOLUTE: 1.93 E9/L (ref 0.1–0.95)
MONOCYTES RELATIVE PERCENT: 10.4 % (ref 2–12)
NEUTROPHILS ABSOLUTE: 16.21 E9/L (ref 1.8–7.3)
NEUTROPHILS RELATIVE PERCENT: 87.3 % (ref 43–80)
O2 CONTENT: 14.3 ML/DL
O2 SATURATION: 96 % (ref 92–98.5)
O2HB: 95.4 % (ref 94–97)
OPERATOR ID: 316
PATIENT TEMP: 37 C
PCO2: 57.7 MMHG (ref 35–45)
PDW BLD-RTO: 12.8 FL (ref 11.5–15)
PEEP/CPAP: 5 CMH2O
PFO2: 2.25 MMHG/%
PH BLOOD GAS: 7.37 (ref 7.35–7.45)
PHOSPHORUS: 4.4 MG/DL (ref 2.5–4.5)
PLATELET # BLD: 309 E9/L (ref 130–450)
PMV BLD AUTO: 11 FL (ref 7–12)
PO2: 90.1 MMHG (ref 75–100)
POIKILOCYTES: ABNORMAL
POLYCHROMASIA: ABNORMAL
POTASSIUM SERPL-SCNC: 4.9 MMOL/L (ref 3.5–5)
PS: 5 CMH20
RBC # BLD: 3.14 E12/L (ref 3.8–5.8)
RI(T): 132 %
SODIUM BLD-SCNC: 139 MMOL/L (ref 132–146)
SOURCE, BLOOD GAS: ABNORMAL
STOMATOCYTES: ABNORMAL
THB: 10.6 G/DL (ref 11.5–16.5)
TIME ANALYZED: 1355
WBC # BLD: 18.6 E9/L (ref 4.5–11.5)

## 2020-06-20 PROCEDURE — 99233 SBSQ HOSP IP/OBS HIGH 50: CPT | Performed by: INTERNAL MEDICINE

## 2020-06-20 PROCEDURE — 87070 CULTURE OTHR SPECIMN AEROBIC: CPT

## 2020-06-20 PROCEDURE — 36600 WITHDRAWAL OF ARTERIAL BLOOD: CPT

## 2020-06-20 PROCEDURE — 2580000003 HC RX 258: Performed by: FAMILY MEDICINE

## 2020-06-20 PROCEDURE — 6360000002 HC RX W HCPCS: Performed by: FAMILY MEDICINE

## 2020-06-20 PROCEDURE — 94660 CPAP INITIATION&MGMT: CPT

## 2020-06-20 PROCEDURE — 6360000002 HC RX W HCPCS: Performed by: INTERNAL MEDICINE

## 2020-06-20 PROCEDURE — 2500000003 HC RX 250 WO HCPCS: Performed by: INTERNAL MEDICINE

## 2020-06-20 PROCEDURE — 99232 SBSQ HOSP IP/OBS MODERATE 35: CPT | Performed by: INTERNAL MEDICINE

## 2020-06-20 PROCEDURE — C9113 INJ PANTOPRAZOLE SODIUM, VIA: HCPCS | Performed by: INTERNAL MEDICINE

## 2020-06-20 PROCEDURE — 2000000000 HC ICU R&B

## 2020-06-20 PROCEDURE — 87040 BLOOD CULTURE FOR BACTERIA: CPT

## 2020-06-20 PROCEDURE — 82805 BLOOD GASES W/O2 SATURATION: CPT

## 2020-06-20 PROCEDURE — 94640 AIRWAY INHALATION TREATMENT: CPT

## 2020-06-20 PROCEDURE — 6370000000 HC RX 637 (ALT 250 FOR IP)

## 2020-06-20 PROCEDURE — 83735 ASSAY OF MAGNESIUM: CPT

## 2020-06-20 PROCEDURE — 6370000000 HC RX 637 (ALT 250 FOR IP): Performed by: INTERNAL MEDICINE

## 2020-06-20 PROCEDURE — 36591 DRAW BLOOD OFF VENOUS DEVICE: CPT

## 2020-06-20 PROCEDURE — 36415 COLL VENOUS BLD VENIPUNCTURE: CPT

## 2020-06-20 PROCEDURE — 80048 BASIC METABOLIC PNL TOTAL CA: CPT

## 2020-06-20 PROCEDURE — 84100 ASSAY OF PHOSPHORUS: CPT

## 2020-06-20 PROCEDURE — 2580000003 HC RX 258

## 2020-06-20 PROCEDURE — 85025 COMPLETE CBC W/AUTO DIFF WBC: CPT

## 2020-06-20 PROCEDURE — 2580000003 HC RX 258: Performed by: INTERNAL MEDICINE

## 2020-06-20 PROCEDURE — 94003 VENT MGMT INPAT SUBQ DAY: CPT

## 2020-06-20 RX ORDER — METHYLPREDNISOLONE SODIUM SUCCINATE 40 MG/ML
40 INJECTION, POWDER, LYOPHILIZED, FOR SOLUTION INTRAMUSCULAR; INTRAVENOUS EVERY 12 HOURS
Status: DISCONTINUED | OUTPATIENT
Start: 2020-06-20 | End: 2020-06-23

## 2020-06-20 RX ORDER — DIMETHICONE, OXYBENZONE, AND PADIMATE O 2; 2.5; 6.6 G/100G; G/100G; G/100G
STICK TOPICAL
Status: COMPLETED
Start: 2020-06-20 | End: 2020-06-20

## 2020-06-20 RX ADMIN — BUDESONIDE 500 MCG: 0.5 SUSPENSION RESPIRATORY (INHALATION) at 19:10

## 2020-06-20 RX ADMIN — ALBUTEROL SULFATE 2.5 MG: 2.5 SOLUTION RESPIRATORY (INHALATION) at 16:02

## 2020-06-20 RX ADMIN — DEXMEDETOMIDINE HYDROCHLORIDE 1.4 MCG/KG/HR: 100 INJECTION, SOLUTION INTRAVENOUS at 11:13

## 2020-06-20 RX ADMIN — Medication: at 18:22

## 2020-06-20 RX ADMIN — DEXMEDETOMIDINE HYDROCHLORIDE 0.8 MCG/KG/HR: 100 INJECTION, SOLUTION INTRAVENOUS at 18:21

## 2020-06-20 RX ADMIN — METHYLPREDNISOLONE SODIUM SUCCINATE 40 MG: 40 INJECTION, POWDER, LYOPHILIZED, FOR SOLUTION INTRAMUSCULAR; INTRAVENOUS at 18:14

## 2020-06-20 RX ADMIN — ENOXAPARIN SODIUM 40 MG: 40 INJECTION SUBCUTANEOUS at 08:02

## 2020-06-20 RX ADMIN — WATER 1 G: 1 INJECTION INTRAMUSCULAR; INTRAVENOUS; SUBCUTANEOUS at 02:00

## 2020-06-20 RX ADMIN — SODIUM CHLORIDE, PRESERVATIVE FREE 300 UNITS: 5 INJECTION INTRAVENOUS at 08:03

## 2020-06-20 RX ADMIN — SODIUM CHLORIDE, PRESERVATIVE FREE 10 ML: 5 INJECTION INTRAVENOUS at 08:30

## 2020-06-20 RX ADMIN — DEXMEDETOMIDINE HYDROCHLORIDE 1.4 MCG/KG/HR: 100 INJECTION, SOLUTION INTRAVENOUS at 09:11

## 2020-06-20 RX ADMIN — ALBUTEROL SULFATE 2.5 MG: 2.5 SOLUTION RESPIRATORY (INHALATION) at 19:10

## 2020-06-20 RX ADMIN — Medication 10 ML: at 21:14

## 2020-06-20 RX ADMIN — WATER 1 G: 1 INJECTION INTRAMUSCULAR; INTRAVENOUS; SUBCUTANEOUS at 18:00

## 2020-06-20 RX ADMIN — METHYLPREDNISOLONE SODIUM SUCCINATE 40 MG: 40 INJECTION, POWDER, LYOPHILIZED, FOR SOLUTION INTRAMUSCULAR; INTRAVENOUS at 06:45

## 2020-06-20 RX ADMIN — DEXMEDETOMIDINE HYDROCHLORIDE 1.4 MCG/KG/HR: 100 INJECTION, SOLUTION INTRAVENOUS at 06:30

## 2020-06-20 RX ADMIN — DEXMEDETOMIDINE HYDROCHLORIDE 1.2 MCG/KG/HR: 100 INJECTION, SOLUTION INTRAVENOUS at 14:23

## 2020-06-20 RX ADMIN — SODIUM CHLORIDE SOLN NEBU 3% 4 ML: 3 NEBU SOLN at 11:59

## 2020-06-20 RX ADMIN — PANTOPRAZOLE SODIUM 40 MG: 40 INJECTION, POWDER, FOR SOLUTION INTRAVENOUS at 08:02

## 2020-06-20 RX ADMIN — Medication 10 ML: at 08:02

## 2020-06-20 RX ADMIN — WATER 1 G: 1 INJECTION INTRAMUSCULAR; INTRAVENOUS; SUBCUTANEOUS at 09:00

## 2020-06-20 RX ADMIN — SODIUM CHLORIDE, PRESERVATIVE FREE 10 ML: 5 INJECTION INTRAVENOUS at 08:20

## 2020-06-20 RX ADMIN — ALBUTEROL SULFATE 2.5 MG: 2.5 SOLUTION RESPIRATORY (INHALATION) at 11:58

## 2020-06-20 RX ADMIN — SODIUM CHLORIDE SOLN NEBU 3% 4 ML: 3 NEBU SOLN at 16:03

## 2020-06-20 RX ADMIN — DEXMEDETOMIDINE HYDROCHLORIDE 1.4 MCG/KG/HR: 100 INJECTION, SOLUTION INTRAVENOUS at 03:11

## 2020-06-20 RX ADMIN — ARFORMOTEROL TARTRATE 15 MCG: 15 SOLUTION RESPIRATORY (INHALATION) at 07:44

## 2020-06-20 RX ADMIN — DEXMEDETOMIDINE HYDROCHLORIDE 1.4 MCG/KG/HR: 100 INJECTION, SOLUTION INTRAVENOUS at 00:32

## 2020-06-20 RX ADMIN — CHLORHEXIDINE GLUCONATE 0.12% ORAL RINSE 15 ML: 1.2 LIQUID ORAL at 21:15

## 2020-06-20 RX ADMIN — ARFORMOTEROL TARTRATE 15 MCG: 15 SOLUTION RESPIRATORY (INHALATION) at 19:10

## 2020-06-20 RX ADMIN — DEXMEDETOMIDINE HYDROCHLORIDE 0.7 MCG/KG/HR: 100 INJECTION, SOLUTION INTRAVENOUS at 23:42

## 2020-06-20 RX ADMIN — CHLORHEXIDINE GLUCONATE 0.12% ORAL RINSE 15 ML: 1.2 LIQUID ORAL at 08:02

## 2020-06-20 RX ADMIN — SODIUM CHLORIDE SOLN NEBU 3% 4 ML: 3 NEBU SOLN at 19:10

## 2020-06-20 RX ADMIN — ALBUTEROL SULFATE 2.5 MG: 2.5 SOLUTION RESPIRATORY (INHALATION) at 07:44

## 2020-06-20 RX ADMIN — WATER: 1 INJECTION INTRAMUSCULAR; INTRAVENOUS; SUBCUTANEOUS at 19:22

## 2020-06-20 RX ADMIN — BUDESONIDE 500 MCG: 0.5 SUSPENSION RESPIRATORY (INHALATION) at 07:44

## 2020-06-20 RX ADMIN — SODIUM CHLORIDE SOLN NEBU 3% 4 ML: 3 NEBU SOLN at 07:44

## 2020-06-20 RX ADMIN — ACETAMINOPHEN 650 MG: 325 TABLET ORAL at 06:27

## 2020-06-20 RX ADMIN — SODIUM CHLORIDE, PRESERVATIVE FREE 10 ML: 5 INJECTION INTRAVENOUS at 08:03

## 2020-06-20 ASSESSMENT — PULMONARY FUNCTION TESTS
PIF_VALUE: 34
PIF_VALUE: 31
PIF_VALUE: 36
PIF_VALUE: 10
PIF_VALUE: 50
PIF_VALUE: 31
PIF_VALUE: 32
PIF_VALUE: 33
PIF_VALUE: 43
PIF_VALUE: 26
PIF_VALUE: 44
PIF_VALUE: 9
PIF_VALUE: 28
PIF_VALUE: 24
PIF_VALUE: 31
PIF_VALUE: 25
PIF_VALUE: 27
PIF_VALUE: 10
PIF_VALUE: 31
PIF_VALUE: 13
PIF_VALUE: 14

## 2020-06-20 ASSESSMENT — PAIN SCALES - GENERAL
PAINLEVEL_OUTOF10: 0

## 2020-06-20 NOTE — PROGRESS NOTES
%: 0 s  Humidification Source: HME  Additional Respiratory  Assessments  Pulse: 74  Resp: 16  SpO2: 100 %  Position: Semi-Villatoro's  Humidification Source: HME  Oral Care: Mouth suctioned, Suction toothette  Subglottic Suction Done?: Yes  Airway Type: ET  Airway Size: 8  Cuff Pressure (cm H2O): 29 cm H2O    ABGs: 6/16 pH 7.474 CO2 59.1 O2 58.1 HCO3 42.4  6/18 pH 7.326 CO2 73.8 O2 105.1 HCO3 37.7  6/19 pH 7.387 CO2 66.4 O2 98.2 HCo3 39.0    Laboratory findings:    Complete Blood Count:   Recent Labs     06/18/20  0425 06/19/20  0555 06/20/20  0654   WBC 15.3* 17.3* 18.6*   HGB 9.5* 9.6* 9.7*   HCT 31.8* 31.2* 32.1*    282 309        Last 3 Blood Glucose:   Recent Labs     06/18/20  0425 06/19/20  0555 06/20/20  0654   GLUCOSE 162* 146* 144*        PT/INR:    Lab Results   Component Value Date    PROTIME 11.2 06/14/2020    INR 1.0 06/14/2020     PTT:    Lab Results   Component Value Date    APTT 30.8 06/14/2020       Comprehensive Metabolic Profile:   Recent Labs     06/18/20  0425 06/19/20  0555 06/20/20  0654    137 139   K 4.6 4.7 4.9   CL 97* 96* 97*   CO2 36* 36* 38*   BUN 26* 29* 33*   CREATININE 0.7 0.6* 0.7   GLUCOSE 162* 146* 144*   CALCIUM 8.3* 8.7 8.2*      Magnesium:   Lab Results   Component Value Date    MG 3.1 06/20/2020     Phosphorus:   Lab Results   Component Value Date    PHOS 4.4 06/20/2020     Ionized Calcium: No results found for: CAION     Urinalysis:     Troponin:   No results for input(s): TROPONINI in the last 72 hours.     Microbiology:     Cultures during this admission:     Blood cultures:                 [x] None drawn      [] Negative             []  Positive (Details:  )  Urine Culture:                   [x] None drawn      [] Negative             []  Positive (Details:  )  Sputum Culture:  Collected 6/16             [] None drawn       [] Negative             [x]  Positive (Details: Staph aureus, Klebsiella and Proteus)   Endotracheal aspirate:     [x] None drawn       [] position      Unchanged from prior study with no evidence of airspace consolidation   or pulmonary venous congestion. Chest Xray (6/20/2020):    ASSESSMENT:     SYSTEMS ASSESSMENT     Neuro   Intubated  Awake and responsive to commands  On ventilator  Continue Precedex - titrate as needed     Respiratory   Acute hypercapnic respiratory failure requiring mechanical ventilation  ABGs prn  6/20: Was able to tolerate pressure support yesterday, trial again today, hopeful for extubation today/tomorrow. Wean oxygen as tolerated.  Keep O2 sat 90-92%  Solumedrol 40mg q8h  Pulmicort 0.5 mg Neb BID  Brovana 15 mcg Neb BID  Albuterol scheduled q6h with Hypertonic saline q6 for secretions  Polymicrobial sputum culture   - continue 1g Ancef q8h     Cardiovascular   Hypotension   -Off pressors  History of hypertension - will restart meds when clinically able  Lovenox for DVT ppx  6/20: Stable     Gastrointestinal   Protonix for vent GI ppx  Lactic acidosis - resolved  6/20: tube feeds until extubated    Renal   Renal function wnl  Continue to monitor with BMP  Monitor electrolytes closely and replete as necessary   Parra in place       Infectious Disease   Respiratory culture polymicrobial and sensitive to Cefazolin  Ancef as above  Resp molecular panel negative   Fevers overnight, 101.2 F  WBC count elevated              - 6/20: 18.6              - Continue to trend daily  CVC removed today, tip culture  Two peripheral IVs placed   - will hold off on the PICC until fevers and increasing white count resolve     Hematology/Oncology   6/20: Hgb 9.7 - stable  No signs of active bleeding  Monitor closely        Endocrine   No known history of diabetes  Monitor blood sugar closely, on steroids and tube feeds              - 6/20: 144   Continue to monitor     Social/Spiritual/DNR/Other   CODE STATUS: Full      PLAN:     WEAN PER PROTOCOL:  [] No   [x] Yes  [] N/A    DISCONTINUE ANY LABS:   [x] No   [] Yes    ICU PROPHYLAXIS:  Stress ulcer:  [x] PPI Agent  [] [id number] [] Sucralfate  [] Other:  VTE:   [x] Enoxaparin  [] Unfract. Heparin Subcut  [] EPC Cuffs    NUTRITION:  [] NPO [x] Tube Feeding (Specify: 60 cc/hr per dietary recs ) [] TPN  [] PO (Diet: DIET TUBE FEED CONTINUOUS/CYCLIC NPO; Low Calorie High Protein; Orogastric; Continuous; 20; 60)    HOME MEDICATIONS RECONCILED: [] No  [x] Yes    INSULIN DRIP:   [x] No   [] Yes    CONSULTATION NEEDED:  [x] No   [] Yes    FAMILY UPDATED:    [] No   [x] Yes    TRANSFER OUT OF ICU:   [x] No   [] Yes    ADDITIONAL PLAN:    Kenia Stark MD, PGY-1                    6/20/2020, 1:30 PM    Critical Care Attending Addendum:    Patient seen and examined with the house staff. X-rays personally reviewed through the PACS. Family is updated at the bedside as available. Additional findings listed below as necessary. Additional comments:  1. Acute hypoxic/ hypercapnic respiratory failure doing well on PS 5 after doing well earlier on PS8, will check abg and possibly extubate to AVAPS. 2. COPD exac better wean steroids. 3. Fever, cx sent and line pulled. 4. If not extubated, will resume TF. 5. Anemic but hg acceptable and stable.     >30 min CCT

## 2020-06-20 NOTE — PROGRESS NOTES
Department of Internal Medicine  Division of Pulmonary, Critical Care & Sleep Medicine  Pulmonary [facility]                                             Pulmonary Progress Note follow up       CC SOB   Patient on Vent     HISTORY OF PRESENT ILLNESS:    Tolerated PSV for some time today  Grow multi bacteria,on abx    Fail weaning and PSV trial today   Agitated when off sedation   No fever or chills   Still on Vent      PHYSICAL EXAMINATION:  Vitals:    06/19/20 2100   BP: 111/68   Pulse: 75   Resp: 20   Temp:    SpO2: 99%     Constitutional: This is a well developed, well nourished 60y.o. year old male who is alert, oriented, cooperative and in no apparent distress. Head was normocephalic and atraumatic. EENT: Mallampati class :  Extraocular muscles intact. External canals are patent and no discharge was appreciated. Septum was midline,   mucosa was without erythema, exudates or cobblestoning. No thrush was noted. Neck: Supple without thyromegaly. No elevated JVP. Trachea was midline. No carotid bruits were auscultated. Respiratory: Rhonchi bilateral    Cardiovascular: Regular without murmur, clicks, gallops or rubs. There is no left or right ventricular heave. Pulses:  Carotid, radial and femoral pulses were equally bilaterally. Abdomen: Slightly rounded and soft without organomegaly. No rebound, rigidity or guarding was appreciated. Lymphatic: No lymphadenopathy. Musculoskeletal: No joint deformity  Extremities: Mild swelling bilaterally +1 edema  Skin:  Warm and dry. Good color, turgor and pigmentation. No lesions or scars. Neurological/Psychiatric: The patient's general behavior, level of consciousness, thought content and emotional status is normal.  Cranial nerves II-XII are intact.       DATA:     FVC 1.37 which is 50% of predicted   FEV1 0.62 which is 17% of predicted    FEV1/FVC 45 which is 57 of predicted with lower limit of normal 68     MVV 22 which is 14% of predicted   impression very severe obstruction  DLCO 38 as. Last PFT which is booked him a severe reduction in DLCO    1 antitrypsin 167 with phenotype MM     Ref.  Range 6/16/2020 16:10 6/16/2020 16:10 6/16/2020 16:10   Organism Unknown Staphylococcus aureus (A) Proteus mirabilis (A) Klebsiella pneumoniae ssp pneumoniae (A)     IMPRESSION:      Acute Hypercapnic resp failure   Acute COPD exacerbation   1-very severe COPD  2-obstructive sleep apnea  3-obesity  4--chronic hypoxic respiratory failure  5-Nicotine dependence    PLAN:      *- Grow multi bacteria as above  On abx   *- discuss with ICU team to adjust sedation and hope avoid trach ,tolerated PSV   *-continue IV steroids ,wean gradually   *-no signs of infection,watch carefully ,still off abx   *-negative  viral panel   *- Sputum culture as above ,on abx   *- procalcitonin 0.06  *-BD  ICS  Will try arrange trilogy on discharge   Nebs  Home inhalers  Pulm rehab as OP  Discuss with Dr Karri Brown  Pulmonary&Critical Care Medicine   Director of [address] [place] [address] Director of [address] [place] [address]   Professor Juanita Geiger

## 2020-06-20 NOTE — PROGRESS NOTES
CO2 36* 36* 38*   BUN 26* 29* 33*   CREATININE 0.7 0.6* 0.7   GLUCOSE 162* 146* 144*   CALCIUM 8.3* 8.7 8.2*       Recent Labs     20  0425 20  0555 20  0654   WBC 15.3* 17.3* 18.6*   RBC 3.12* 3.13* 3.14*   HGB 9.5* 9.6* 9.7*   HCT 31.8* 31.2* 32.1*   .9* 99.7 102.2*   MCH 30.4 30.7 30.9   MCHC 29.9* 30.8* 30.2*   RDW 12.8 12.7 12.8    282 309   MPV 9.7 10.9 11.0     Assessment:    Principal Problem:    Acute on chronic respiratory failure with hypoxia and hypercapnia (HCC)  Active Problems:    Hypertension    COPD exacerbation (HCC)    Macrocytic anemia  Resolved Problems:    * No resolved hospital problems. *      Plan:    COPD exacerbation: Continue albuterol, Brovana, Pulmicort, and Solu-Medrol. Pneumonia: Sputum culture is polymicrobial and sensitive to Ancef. Continue Ancef. WBC currently 18.6 from 17.3. Respiratory acidosis with metabolic alkalosis: Secondary to COPD exacerbation. Correct the underlying cause. Acute hypoxic and hypercapnic respiratory failure: Secondary to COPD exacerbation. AB.37/57.7/90.1/32. 6. COVID negative. Wean from vent as tolerated. Hypotension, resolved: Likely secondary to propofol which has been stopped. SBP currently in the 120's. Monitor hemodynamics. NOTE: This report was transcribed using voice recognition software. Every effort was made to ensure accuracy; however, inadvertent computerized transcription errors may be present.   Electronically signed by Dmitry Lam MD on 2020 at 3:12 PM

## 2020-06-21 LAB
ANION GAP SERPL CALCULATED.3IONS-SCNC: 6 MMOL/L (ref 7–16)
BASOPHILS ABSOLUTE: 0.01 E9/L (ref 0–0.2)
BASOPHILS RELATIVE PERCENT: 0.1 % (ref 0–2)
BUN BLDV-MCNC: 25 MG/DL (ref 8–23)
CALCIUM SERPL-MCNC: 8.9 MG/DL (ref 8.6–10.2)
CHLORIDE BLD-SCNC: 94 MMOL/L (ref 98–107)
CO2: 41 MMOL/L (ref 22–29)
CREAT SERPL-MCNC: 0.7 MG/DL (ref 0.7–1.2)
EOSINOPHILS ABSOLUTE: 0.01 E9/L (ref 0.05–0.5)
EOSINOPHILS RELATIVE PERCENT: 0.1 % (ref 0–6)
GFR AFRICAN AMERICAN: >60
GFR NON-AFRICAN AMERICAN: >60 ML/MIN/1.73
GLUCOSE BLD-MCNC: 134 MG/DL (ref 74–99)
HCT VFR BLD CALC: 31.9 % (ref 37–54)
HEMOGLOBIN: 9.6 G/DL (ref 12.5–16.5)
HYPOCHROMIA: ABNORMAL
IMMATURE GRANULOCYTES #: 0.05 E9/L
IMMATURE GRANULOCYTES %: 0.3 % (ref 0–5)
LYMPHOCYTES ABSOLUTE: 1 E9/L (ref 1.5–4)
LYMPHOCYTES RELATIVE PERCENT: 6.8 % (ref 20–42)
MAGNESIUM: 2.2 MG/DL (ref 1.6–2.6)
MCH RBC QN AUTO: 30.8 PG (ref 26–35)
MCHC RBC AUTO-ENTMCNC: 30.1 % (ref 32–34.5)
MCV RBC AUTO: 102.2 FL (ref 80–99.9)
MONOCYTES ABSOLUTE: 1.58 E9/L (ref 0.1–0.95)
MONOCYTES RELATIVE PERCENT: 10.7 % (ref 2–12)
NEUTROPHILS ABSOLUTE: 12.11 E9/L (ref 1.8–7.3)
NEUTROPHILS RELATIVE PERCENT: 82 % (ref 43–80)
PDW BLD-RTO: 12.7 FL (ref 11.5–15)
PHOSPHORUS: 3.8 MG/DL (ref 2.5–4.5)
PLATELET # BLD: 310 E9/L (ref 130–450)
PMV BLD AUTO: 10.2 FL (ref 7–12)
POTASSIUM SERPL-SCNC: 4.5 MMOL/L (ref 3.5–5)
RBC # BLD: 3.12 E12/L (ref 3.8–5.8)
SODIUM BLD-SCNC: 141 MMOL/L (ref 132–146)
STOMATOCYTES: ABNORMAL
WBC # BLD: 14.8 E9/L (ref 4.5–11.5)

## 2020-06-21 PROCEDURE — 80048 BASIC METABOLIC PNL TOTAL CA: CPT

## 2020-06-21 PROCEDURE — 85025 COMPLETE CBC W/AUTO DIFF WBC: CPT

## 2020-06-21 PROCEDURE — 6370000000 HC RX 637 (ALT 250 FOR IP): Performed by: INTERNAL MEDICINE

## 2020-06-21 PROCEDURE — 99233 SBSQ HOSP IP/OBS HIGH 50: CPT | Performed by: INTERNAL MEDICINE

## 2020-06-21 PROCEDURE — 94640 AIRWAY INHALATION TREATMENT: CPT

## 2020-06-21 PROCEDURE — 6360000002 HC RX W HCPCS: Performed by: INTERNAL MEDICINE

## 2020-06-21 PROCEDURE — 2580000003 HC RX 258: Performed by: FAMILY MEDICINE

## 2020-06-21 PROCEDURE — 94660 CPAP INITIATION&MGMT: CPT

## 2020-06-21 PROCEDURE — 2500000003 HC RX 250 WO HCPCS: Performed by: INTERNAL MEDICINE

## 2020-06-21 PROCEDURE — 2700000000 HC OXYGEN THERAPY PER DAY

## 2020-06-21 PROCEDURE — 83735 ASSAY OF MAGNESIUM: CPT

## 2020-06-21 PROCEDURE — 2580000003 HC RX 258: Performed by: INTERNAL MEDICINE

## 2020-06-21 PROCEDURE — 99232 SBSQ HOSP IP/OBS MODERATE 35: CPT | Performed by: INTERNAL MEDICINE

## 2020-06-21 PROCEDURE — 6360000002 HC RX W HCPCS: Performed by: FAMILY MEDICINE

## 2020-06-21 PROCEDURE — C9113 INJ PANTOPRAZOLE SODIUM, VIA: HCPCS | Performed by: INTERNAL MEDICINE

## 2020-06-21 PROCEDURE — 36415 COLL VENOUS BLD VENIPUNCTURE: CPT

## 2020-06-21 PROCEDURE — 2000000000 HC ICU R&B

## 2020-06-21 PROCEDURE — 84100 ASSAY OF PHOSPHORUS: CPT

## 2020-06-21 RX ORDER — LISINOPRIL 20 MG/1
20 TABLET ORAL DAILY
Status: DISCONTINUED | OUTPATIENT
Start: 2020-06-21 | End: 2020-06-25 | Stop reason: HOSPADM

## 2020-06-21 RX ORDER — LORAZEPAM 2 MG/ML
1 INJECTION INTRAMUSCULAR ONCE
Status: COMPLETED | OUTPATIENT
Start: 2020-06-21 | End: 2020-06-21

## 2020-06-21 RX ORDER — HYDROCHLOROTHIAZIDE 25 MG/1
25 TABLET ORAL DAILY
Status: DISCONTINUED | OUTPATIENT
Start: 2020-06-21 | End: 2020-06-25 | Stop reason: HOSPADM

## 2020-06-21 RX ADMIN — ALBUTEROL SULFATE 2.5 MG: 2.5 SOLUTION RESPIRATORY (INHALATION) at 15:45

## 2020-06-21 RX ADMIN — CHLORHEXIDINE GLUCONATE 0.12% ORAL RINSE 15 ML: 1.2 LIQUID ORAL at 20:31

## 2020-06-21 RX ADMIN — ALBUTEROL SULFATE 2.5 MG: 2.5 SOLUTION RESPIRATORY (INHALATION) at 11:11

## 2020-06-21 RX ADMIN — ENOXAPARIN SODIUM 40 MG: 40 INJECTION SUBCUTANEOUS at 10:00

## 2020-06-21 RX ADMIN — CHLORHEXIDINE GLUCONATE 0.12% ORAL RINSE 15 ML: 1.2 LIQUID ORAL at 07:54

## 2020-06-21 RX ADMIN — DEXMEDETOMIDINE HYDROCHLORIDE 0.7 MCG/KG/HR: 100 INJECTION, SOLUTION INTRAVENOUS at 04:45

## 2020-06-21 RX ADMIN — SODIUM CHLORIDE SOLN NEBU 3% 4 ML: 3 NEBU SOLN at 19:50

## 2020-06-21 RX ADMIN — ALBUTEROL SULFATE 2.5 MG: 2.5 SOLUTION RESPIRATORY (INHALATION) at 19:50

## 2020-06-21 RX ADMIN — ARFORMOTEROL TARTRATE 15 MCG: 15 SOLUTION RESPIRATORY (INHALATION) at 20:05

## 2020-06-21 RX ADMIN — WATER 1 G: 1 INJECTION INTRAMUSCULAR; INTRAVENOUS; SUBCUTANEOUS at 18:29

## 2020-06-21 RX ADMIN — Medication 10 ML: at 10:09

## 2020-06-21 RX ADMIN — LISINOPRIL 20 MG: 20 TABLET ORAL at 21:12

## 2020-06-21 RX ADMIN — METHYLPREDNISOLONE SODIUM SUCCINATE 40 MG: 40 INJECTION, POWDER, LYOPHILIZED, FOR SOLUTION INTRAMUSCULAR; INTRAVENOUS at 06:02

## 2020-06-21 RX ADMIN — WATER 1 G: 1 INJECTION INTRAMUSCULAR; INTRAVENOUS; SUBCUTANEOUS at 10:00

## 2020-06-21 RX ADMIN — HYDROCHLOROTHIAZIDE 25 MG: 25 TABLET ORAL at 21:12

## 2020-06-21 RX ADMIN — BUDESONIDE 500 MCG: 0.5 SUSPENSION RESPIRATORY (INHALATION) at 20:05

## 2020-06-21 RX ADMIN — METHYLPREDNISOLONE SODIUM SUCCINATE 40 MG: 40 INJECTION, POWDER, LYOPHILIZED, FOR SOLUTION INTRAMUSCULAR; INTRAVENOUS at 18:30

## 2020-06-21 RX ADMIN — Medication 10 ML: at 20:31

## 2020-06-21 RX ADMIN — SODIUM CHLORIDE, PRESERVATIVE FREE 10 ML: 5 INJECTION INTRAVENOUS at 07:54

## 2020-06-21 RX ADMIN — PANTOPRAZOLE SODIUM 40 MG: 40 INJECTION, POWDER, FOR SOLUTION INTRAVENOUS at 10:08

## 2020-06-21 RX ADMIN — WATER 1 G: 1 INJECTION INTRAMUSCULAR; INTRAVENOUS; SUBCUTANEOUS at 01:13

## 2020-06-21 RX ADMIN — LORAZEPAM 1 MG: 2 INJECTION INTRAMUSCULAR; INTRAVENOUS at 22:35

## 2020-06-21 RX ADMIN — SODIUM CHLORIDE SOLN NEBU 3% 4 ML: 3 NEBU SOLN at 11:11

## 2020-06-21 RX ADMIN — ALBUTEROL SULFATE 2.5 MG: 2.5 SOLUTION RESPIRATORY (INHALATION) at 08:00

## 2020-06-21 RX ADMIN — ARFORMOTEROL TARTRATE 15 MCG: 15 SOLUTION RESPIRATORY (INHALATION) at 08:00

## 2020-06-21 RX ADMIN — BUDESONIDE 500 MCG: 0.5 SUSPENSION RESPIRATORY (INHALATION) at 08:00

## 2020-06-21 RX ADMIN — SODIUM CHLORIDE SOLN NEBU 3% 4 ML: 3 NEBU SOLN at 15:45

## 2020-06-21 RX ADMIN — SODIUM CHLORIDE SOLN NEBU 3% 4 ML: 3 NEBU SOLN at 08:00

## 2020-06-21 ASSESSMENT — PAIN SCALES - GENERAL
PAINLEVEL_OUTOF10: 0

## 2020-06-21 NOTE — PROGRESS NOTES
swelling     Any additional physical findings:    MEDICATIONS:    Scheduled Meds:   methylPREDNISolone  40 mg Intravenous Q12H    ceFAZolin  1 g Intravenous Q8H    lidocaine PF  5 mL Intradermal Once    albuterol  2.5 mg Nebulization 4x daily    sodium chloride (Inhalant)  4 mL Nebulization 4x daily    sodium chloride flush  10 mL Intravenous 2 times per day    enoxaparin  40 mg Subcutaneous Daily    Arformoterol Tartrate  15 mcg Nebulization BID    budesonide  0.5 mg Nebulization BID    chlorhexidine  15 mL Mouth/Throat BID    pantoprazole  40 mg Intravenous Daily    And    sodium chloride (PF)  10 mL Intravenous Daily     Continuous Infusions:   dexmedetomidine (PRECEDEX) IV infusion Stopped (06/21/20 0730)     PRN Meds:   sodium chloride flush, 10 mL, PRN  sodium chloride flush, 10 mL, PRN  potassium chloride, 40 mEq, PRN    Or  potassium alternative oral replacement, 40 mEq, PRN    Or  potassium chloride, 10 mEq, PRN  magnesium sulfate, 1 g, PRN  acetaminophen, 650 mg, Q6H PRN    Or  acetaminophen, 650 mg, Q6H PRN  magnesium hydroxide, 30 mL, Daily PRN  promethazine, 12.5 mg, Q6H PRN    Or  ondansetron, 4 mg, Q6H PRN          VENT SETTINGS (Comprehensive) (if applicable):         ABGs: 6/16 pH 7.474 CO2 59.1 O2 58.1 HCO3 42.4  6/18 pH 7.326 CO2 73.8 O2 105.1 HCO3 37.7  6/19 pH 7.387 CO2 66.4 O2 98.2 HCo3 39.0    Laboratory findings:    Complete Blood Count:   Recent Labs     06/19/20  0555 06/20/20  0654 06/21/20  0540   WBC 17.3* 18.6* 14.8*   HGB 9.6* 9.7* 9.6*   HCT 31.2* 32.1* 31.9*    309 310        Last 3 Blood Glucose:   Recent Labs     06/19/20  0555 06/20/20  0654 06/21/20  0540   GLUCOSE 146* 144* 134*        PT/INR:    Lab Results   Component Value Date    PROTIME 11.2 06/14/2020    INR 1.0 06/14/2020     PTT:    Lab Results   Component Value Date    APTT 30.8 06/14/2020       Comprehensive Metabolic Profile:   Recent Labs     06/19/20  0555 06/20/20  0654 06/21/20  0540    139 141   K 4.7 4.9 4.5   CL 96* 97* 94*   CO2 36* 38* 41*   BUN 29* 33* 25*   CREATININE 0.6* 0.7 0.7   GLUCOSE 146* 144* 134*   CALCIUM 8.7 8.2* 8.9      Magnesium:   Lab Results   Component Value Date    MG 2.2 06/21/2020     Phosphorus:   Lab Results   Component Value Date    PHOS 3.8 06/21/2020     Ionized Calcium: No results found for: CAION     Urinalysis:     Troponin:   No results for input(s): TROPONINI in the last 72 hours. Microbiology:     Cultures during this admission:     Blood cultures:                 [x] None drawn      [] Negative             []  Positive (Details:  )  Urine Culture:                   [x] None drawn      [] Negative             []  Positive (Details:  )  Sputum Culture:  Collected 6/16             [] None drawn       [] Negative             [x]  Positive (Details: Staph aureus, Klebsiella and Proteus)   Endotracheal aspirate:     [x] None drawn       [] Negative             []  Positive (Details:  )     Other pertinent Labs:   Respiratory molecular panel on 6/15: negative  MRSA screen negative on 6/15    Radiology/Imaging:     XR CHEST PORTABLE   Final Result   1. Bibasilar airspace disease compatible with atelectasis or pneumonia   2. Stable positioning of support lines and tubes   3. Questionable small right pleural effusion. This study was dictated by Thaddeus Mitchell PA-C and Petra Hernadez MD    reviewed and concurred with the findings. XR CHEST PORTABLE   Final Result   1. Persistent bibasilar airspace disease compatible with atelectasis   or pneumonia   2. Stable positioning of support lines and tubes. 3. Small bilateral pleural effusions are noted. This study was dictated by Thaddeus Mitchell PA-C and Petra Hernadez MD    reviewed and concurred with the findings. XR CHEST PORTABLE   Final Result   Stable life-support devices as described above. No significant interval change in the bibasilar air space disease from   the previous study. monitor with BMP  Monitor electrolytes closely and replete as necessary   Parra in place        Infectious Disease   Respiratory culture polymicrobial and sensitive to Cefazolin  Ancef as above  Resp molecular panel negative   Afebrile  WBC count elevated              - 6/21: 14.8, trending down              - Continue to trend daily  Tip culture prelim negative  OK to continue with peripheral lines     Hematology/Oncology   Anemia  6/21: Hgb 9.6 - stable  No signs of active bleeding  Monitor closely        Endocrine   No known history of diabetes  Monitor blood sugar closely, on steroids and tube feeds              - 6/21: 134   Continue to monitor     Social/Spiritual/DNR/Other   CODE STATUS: Full  May progress towards trach if not tolerating any breaks from NIV      PLAN:     WEAN PER PROTOCOL:  [] No   [] Yes  [x] N/A    DISCONTINUE ANY LABS:   [x] No   [] Yes    ICU PROPHYLAXIS:  Stress ulcer:  [x] PPI Agent  [] [id number] [] Sucralfate  [] Other:  VTE:   [x] Enoxaparin  [] Unfract. Heparin Subcut  [] EPC Cuffs    NUTRITION:  [x] NPO [] Tube Feeding (Specify: 60 cc/hr per dietary recs ) [] TPN  [] PO (Diet: Diet NPO Effective Now)    HOME MEDICATIONS RECONCILED: [] No  [x] Yes    INSULIN DRIP:   [x] No   [] Yes    CONSULTATION NEEDED:  [x] No   [] Yes    FAMILY UPDATED:    [] No   [x] Yes    TRANSFER OUT OF ICU:   [x] No   [] Yes    ADDITIONAL PLAN:    Evelio Curtis MD, PGY-1                    6/21/2020, 10:21 AM    Critical Care Attending Addendum:    Patient seen and examined with the house staff. X-rays personally reviewed through the PACS. Family is updated at the bedside as available. Additional findings listed below as necessary. Additional comments:  1. Acute hypercapnic respiratory failure, now extubated but AVAPS dependent. Unable to tolerate any breaks. 2. COPD exac is someone better, continue steroids and nebs as is. 3. Polymicrobial (Staph and GNR) bronchitis on cefazolin.    4. Hyperglycemia acceptably controlled. 5. Cancel picc line and continue with peripheral access. 6. Keep in ICU as marginal respiratory status.

## 2020-06-21 NOTE — PLAN OF CARE
Problem: Falls - Risk of:  Goal: Will remain free from falls  Description: Will remain free from falls  6/21/2020 1835 by Jomar Polanco RN  Outcome: Met This Shift  6/21/2020 1806 by Jomar Polanco RN  Outcome: Met This Shift  Goal: Absence of physical injury  Description: Absence of physical injury  6/21/2020 1835 by Jomar Polanco RN  Outcome: Met This Shift  6/21/2020 1806 by Jomar Polanco RN  Outcome: Met This Shift     Problem: Airway Clearance - Ineffective:  Goal: Ability to maintain a clear airway will improve  Description: Ability to maintain a clear airway will improve  Outcome: Met This Shift     Problem: Anxiety/Stress:  Goal: Level of anxiety will decrease  Description: Level of anxiety will decrease  Outcome: Met This Shift     Problem: Aspiration:  Goal: Absence of aspiration  Description: Absence of aspiration  Outcome: Met This Shift     Problem: Fluid Volume - Imbalance:  Goal: Absence of imbalanced fluid volume signs and symptoms  Description: Absence of imbalanced fluid volume signs and symptoms  Outcome: Met This Shift     Problem: Gas Exchange - Impaired:  Goal: Levels of oxygenation will improve  Description: Levels of oxygenation will improve  Outcome: Met This Shift     Problem: Mental Status - Impaired:  Goal: Mental status will be restored to baseline  Description: Mental status will be restored to baseline  Outcome: Met This Shift     Problem: Pain:  Goal: Pain level will decrease  Description: Pain level will decrease  Outcome: Met This Shift  Goal: Recognizes and communicates pain  Description: Recognizes and communicates pain  Outcome: Met This Shift  Goal: Control of acute pain  Description: Control of acute pain  Outcome: Met This Shift

## 2020-06-21 NOTE — PROGRESS NOTES
resp failure   Acute COPD exacerbation   1-very severe COPD  2-obstructive sleep apnea  3-obesity  4--chronic hypoxic respiratory failure  5-Nicotine dependence    PLAN:    Extubated   On AVAPS     *- Grow multi bacteria as above,on Ancef   *-Continue IV steroids ,wean gradually  On 40 q12 ,may change PO next 24 h   *- Sputum culture as above ,on abx   *-BD  ICS  Nebs  Home inhalers  Pulm rehab as OP  Discuss with Dr Steph Fernandez he has BiPAP machine at home ,will see what he has and adjust as OP      Altaf Cuba MD,[facility]  Pulmonary&Critical Care Medicine   Director of [address] [place] [address] Director of [address] [place] [address]    Susana Leigh

## 2020-06-22 LAB
ANION GAP SERPL CALCULATED.3IONS-SCNC: 6 MMOL/L (ref 7–16)
BASOPHILS ABSOLUTE: 0.02 E9/L (ref 0–0.2)
BASOPHILS RELATIVE PERCENT: 0.1 % (ref 0–2)
BUN BLDV-MCNC: 19 MG/DL (ref 8–23)
CALCIUM SERPL-MCNC: 9 MG/DL (ref 8.6–10.2)
CHLORIDE BLD-SCNC: 93 MMOL/L (ref 98–107)
CO2: 41 MMOL/L (ref 22–29)
CREAT SERPL-MCNC: 0.7 MG/DL (ref 0.7–1.2)
CULTURE CATHETER TIP: NORMAL
EOSINOPHILS ABSOLUTE: 0.01 E9/L (ref 0.05–0.5)
EOSINOPHILS RELATIVE PERCENT: 0.1 % (ref 0–6)
GFR AFRICAN AMERICAN: >60
GFR NON-AFRICAN AMERICAN: >60 ML/MIN/1.73
GLUCOSE BLD-MCNC: 91 MG/DL (ref 74–99)
HCT VFR BLD CALC: 31.8 % (ref 37–54)
HEMOGLOBIN: 9.8 G/DL (ref 12.5–16.5)
IMMATURE GRANULOCYTES #: 0.09 E9/L
IMMATURE GRANULOCYTES %: 0.5 % (ref 0–5)
LYMPHOCYTES ABSOLUTE: 1.16 E9/L (ref 1.5–4)
LYMPHOCYTES RELATIVE PERCENT: 6.1 % (ref 20–42)
MAGNESIUM: 2.1 MG/DL (ref 1.6–2.6)
MCH RBC QN AUTO: 31.3 PG (ref 26–35)
MCHC RBC AUTO-ENTMCNC: 30.8 % (ref 32–34.5)
MCV RBC AUTO: 101.6 FL (ref 80–99.9)
MONOCYTES ABSOLUTE: 2.28 E9/L (ref 0.1–0.95)
MONOCYTES RELATIVE PERCENT: 12 % (ref 2–12)
NEUTROPHILS ABSOLUTE: 15.48 E9/L (ref 1.8–7.3)
NEUTROPHILS RELATIVE PERCENT: 81.2 % (ref 43–80)
PDW BLD-RTO: 12.8 FL (ref 11.5–15)
PHOSPHORUS: 3.6 MG/DL (ref 2.5–4.5)
PLATELET # BLD: 342 E9/L (ref 130–450)
PMV BLD AUTO: 9.6 FL (ref 7–12)
POTASSIUM SERPL-SCNC: 3.7 MMOL/L (ref 3.5–5)
RBC # BLD: 3.13 E12/L (ref 3.8–5.8)
SODIUM BLD-SCNC: 140 MMOL/L (ref 132–146)
WBC # BLD: 19 E9/L (ref 4.5–11.5)

## 2020-06-22 PROCEDURE — 2000000000 HC ICU R&B

## 2020-06-22 PROCEDURE — C9113 INJ PANTOPRAZOLE SODIUM, VIA: HCPCS | Performed by: INTERNAL MEDICINE

## 2020-06-22 PROCEDURE — 6370000000 HC RX 637 (ALT 250 FOR IP): Performed by: INTERNAL MEDICINE

## 2020-06-22 PROCEDURE — 94640 AIRWAY INHALATION TREATMENT: CPT

## 2020-06-22 PROCEDURE — 6360000002 HC RX W HCPCS: Performed by: INTERNAL MEDICINE

## 2020-06-22 PROCEDURE — 2700000000 HC OXYGEN THERAPY PER DAY

## 2020-06-22 PROCEDURE — 99233 SBSQ HOSP IP/OBS HIGH 50: CPT | Performed by: INTERNAL MEDICINE

## 2020-06-22 PROCEDURE — 92526 ORAL FUNCTION THERAPY: CPT | Performed by: SPEECH-LANGUAGE PATHOLOGIST

## 2020-06-22 PROCEDURE — 2580000003 HC RX 258: Performed by: FAMILY MEDICINE

## 2020-06-22 PROCEDURE — 36415 COLL VENOUS BLD VENIPUNCTURE: CPT

## 2020-06-22 PROCEDURE — 84100 ASSAY OF PHOSPHORUS: CPT

## 2020-06-22 PROCEDURE — 80048 BASIC METABOLIC PNL TOTAL CA: CPT

## 2020-06-22 PROCEDURE — 6360000002 HC RX W HCPCS: Performed by: FAMILY MEDICINE

## 2020-06-22 PROCEDURE — 92610 EVALUATE SWALLOWING FUNCTION: CPT | Performed by: SPEECH-LANGUAGE PATHOLOGIST

## 2020-06-22 PROCEDURE — 94660 CPAP INITIATION&MGMT: CPT

## 2020-06-22 PROCEDURE — 83735 ASSAY OF MAGNESIUM: CPT

## 2020-06-22 PROCEDURE — 2580000003 HC RX 258: Performed by: INTERNAL MEDICINE

## 2020-06-22 PROCEDURE — 85025 COMPLETE CBC W/AUTO DIFF WBC: CPT

## 2020-06-22 RX ORDER — LABETALOL HYDROCHLORIDE 5 MG/ML
10 INJECTION, SOLUTION INTRAVENOUS EVERY 6 HOURS PRN
Status: DISCONTINUED | OUTPATIENT
Start: 2020-06-22 | End: 2020-06-25 | Stop reason: HOSPADM

## 2020-06-22 RX ORDER — PANTOPRAZOLE SODIUM 40 MG/1
40 TABLET, DELAYED RELEASE ORAL
Status: DISCONTINUED | OUTPATIENT
Start: 2020-06-23 | End: 2020-06-25 | Stop reason: HOSPADM

## 2020-06-22 RX ORDER — HYDRALAZINE HYDROCHLORIDE 20 MG/ML
10 INJECTION INTRAMUSCULAR; INTRAVENOUS EVERY 6 HOURS PRN
Status: DISCONTINUED | OUTPATIENT
Start: 2020-06-22 | End: 2020-06-25 | Stop reason: HOSPADM

## 2020-06-22 RX ADMIN — METHYLPREDNISOLONE SODIUM SUCCINATE 40 MG: 40 INJECTION, POWDER, LYOPHILIZED, FOR SOLUTION INTRAMUSCULAR; INTRAVENOUS at 06:18

## 2020-06-22 RX ADMIN — WATER 1 G: 1 INJECTION INTRAMUSCULAR; INTRAVENOUS; SUBCUTANEOUS at 09:40

## 2020-06-22 RX ADMIN — BUDESONIDE 500 MCG: 0.5 SUSPENSION RESPIRATORY (INHALATION) at 19:10

## 2020-06-22 RX ADMIN — SODIUM CHLORIDE, PRESERVATIVE FREE 10 ML: 5 INJECTION INTRAVENOUS at 09:31

## 2020-06-22 RX ADMIN — PANTOPRAZOLE SODIUM 40 MG: 40 INJECTION, POWDER, FOR SOLUTION INTRAVENOUS at 09:31

## 2020-06-22 RX ADMIN — HYDRALAZINE HYDROCHLORIDE 10 MG: 20 INJECTION INTRAMUSCULAR; INTRAVENOUS at 14:50

## 2020-06-22 RX ADMIN — SODIUM CHLORIDE SOLN NEBU 3% 4 ML: 3 NEBU SOLN at 15:40

## 2020-06-22 RX ADMIN — CHLORHEXIDINE GLUCONATE 0.12% ORAL RINSE 15 ML: 1.2 LIQUID ORAL at 09:40

## 2020-06-22 RX ADMIN — SODIUM CHLORIDE SOLN NEBU 3% 4 ML: 3 NEBU SOLN at 19:10

## 2020-06-22 RX ADMIN — ARFORMOTEROL TARTRATE 15 MCG: 15 SOLUTION RESPIRATORY (INHALATION) at 07:27

## 2020-06-22 RX ADMIN — WATER 1 G: 1 INJECTION INTRAMUSCULAR; INTRAVENOUS; SUBCUTANEOUS at 18:56

## 2020-06-22 RX ADMIN — BUDESONIDE 500 MCG: 0.5 SUSPENSION RESPIRATORY (INHALATION) at 07:27

## 2020-06-22 RX ADMIN — METHYLPREDNISOLONE SODIUM SUCCINATE 40 MG: 40 INJECTION, POWDER, LYOPHILIZED, FOR SOLUTION INTRAMUSCULAR; INTRAVENOUS at 18:56

## 2020-06-22 RX ADMIN — HYDROCHLOROTHIAZIDE 25 MG: 25 TABLET ORAL at 09:31

## 2020-06-22 RX ADMIN — ARFORMOTEROL TARTRATE 15 MCG: 15 SOLUTION RESPIRATORY (INHALATION) at 19:10

## 2020-06-22 RX ADMIN — ALBUTEROL SULFATE 2.5 MG: 2.5 SOLUTION RESPIRATORY (INHALATION) at 07:39

## 2020-06-22 RX ADMIN — WATER 1 G: 1 INJECTION INTRAMUSCULAR; INTRAVENOUS; SUBCUTANEOUS at 01:32

## 2020-06-22 RX ADMIN — LISINOPRIL 20 MG: 20 TABLET ORAL at 09:31

## 2020-06-22 RX ADMIN — SODIUM CHLORIDE SOLN NEBU 3% 4 ML: 3 NEBU SOLN at 07:39

## 2020-06-22 RX ADMIN — ALBUTEROL SULFATE 2.5 MG: 2.5 SOLUTION RESPIRATORY (INHALATION) at 19:10

## 2020-06-22 RX ADMIN — ALBUTEROL SULFATE 2.5 MG: 2.5 SOLUTION RESPIRATORY (INHALATION) at 15:40

## 2020-06-22 ASSESSMENT — PAIN SCALES - GENERAL
PAINLEVEL_OUTOF10: 0

## 2020-06-22 NOTE — PROGRESS NOTES
Full note to follow. I have assessed pt. Chart reviewed and updated by nursing. Son present while examining pt. Although pt able to have awareness of surroundings and wanting to go home, it is not demonstrating adequate compentency to make decisions at this time. Pt states that he is completely fine and that there is nothing wrong with him and can take care of himself at home. Discussed different aspects of his current care and addressing what would he do should he go home. Discussed high risk of decompensation with death as one of these possibilities. Pt does believe that there is a chance that he will decompensate and has no plan should his condition worsen or change his mind. Pt recently extubated and with acute respiratory failure with hypercapnia. Pt on high flow oxygen. Current symptoms likely from metabolic encephalopathy from his respiratory status. Steroids may also be interplaying with the encephalopathy as well. Will need to continue to reassess.  Consider psychiatry consult, especially if having worsening agitation

## 2020-06-22 NOTE — PROGRESS NOTES
color, texture, turgor and no redness, warmth, or swelling      Any additional physical findings:    MEDICATIONS:    Scheduled Meds:   [START ON 6/23/2020] pantoprazole  40 mg Oral QAM AC    lisinopril  20 mg Oral Daily    hydroCHLOROthiazide  25 mg Oral Daily    methylPREDNISolone  40 mg Intravenous Q12H    ceFAZolin  1 g Intravenous Q8H    albuterol  2.5 mg Nebulization 4x daily    sodium chloride (Inhalant)  4 mL Nebulization 4x daily    sodium chloride flush  10 mL Intravenous 2 times per day    enoxaparin  40 mg Subcutaneous Daily    Arformoterol Tartrate  15 mcg Nebulization BID    budesonide  0.5 mg Nebulization BID    chlorhexidine  15 mL Mouth/Throat BID     Continuous Infusions:    PRN Meds:   hydrALAZINE, 10 mg, Q6H PRN  sodium chloride flush, 10 mL, PRN  potassium chloride, 40 mEq, PRN    Or  potassium alternative oral replacement, 40 mEq, PRN    Or  potassium chloride, 10 mEq, PRN  magnesium sulfate, 1 g, PRN  acetaminophen, 650 mg, Q6H PRN    Or  acetaminophen, 650 mg, Q6H PRN  magnesium hydroxide, 30 mL, Daily PRN  promethazine, 12.5 mg, Q6H PRN    Or  ondansetron, 4 mg, Q6H PRN          VENT SETTINGS (Comprehensive) (if applicable): Extubated 2/92        ABGs: 6/16 pH 7.474 CO2 59.1 O2 58.1 HCO3 42.4  6/18 pH 7.326 CO2 73.8 O2 105.1 HCO3 37.7  6/19 pH 7.387 CO2 66.4 O2 98.2 HCo3 39.0  6/20 pH 7.370 CO2 57.7 O2 90.1 HCO3 32.6    Laboratory findings:    Complete Blood Count:   Recent Labs     06/20/20  0654 06/21/20  0540 06/22/20  0520   WBC 18.6* 14.8* 19.0*   HGB 9.7* 9.6* 9.8*   HCT 32.1* 31.9* 31.8*    310 342        Last 3 Blood Glucose:   Recent Labs     06/20/20  0654 06/21/20  0540 06/22/20  0520   GLUCOSE 144* 134* 91        PT/INR:    Lab Results   Component Value Date    PROTIME 11.2 06/14/2020    INR 1.0 06/14/2020     PTT:    Lab Results   Component Value Date    APTT 30.8 06/14/2020       Comprehensive Metabolic Profile:   Recent Labs     06/20/20  0654 06/21/20  0540 Additional nonacute findings as described above. THIS REPORT CONTAINS FINDINGS THAT MAY BE CRITICAL TO PATIENT CARE. The    findings were verbally communicated via telephone conference with Yvrose Silva    at 3:46 AM EDT on 6/15/2020. The findings were acknowledged and understood. COMMENTS:    Consistent with the [employer] of [employer]'s Incidental Findings    Committee white paper Lucie Schumacher Am Shirlene Radiol 2018): Any incidental renal lesion less    than 1.0 cm or classified as too small to characterize, or any incidental    cystic renal lesion characterized as simple-appearing, is likely benign. No    follow-up imaging is recommended for these lesions per consensus    recommendations based on imaging criteria. This report has been electronically signed by Raheem Kelly MD.      XR CHEST PORTABLE   Final Result      Chronic obstructive pulmonary disease      Support lines in satisfactory position      Unchanged from prior study with no evidence of airspace consolidation   or pulmonary venous congestion. Chest Xray (6/22/2020):    ASSESSMENT:     SYSTEMS ASSESSMENT     Neuro   Awake and responsive to commands  Alert and oriented times 3  Requesting to leave, unable to provide insight into why he needs to leave other than that his children are at home     Respiratory   Acute hypercapnic respiratory failure requiring mechanical ventilation  Refused repeat ABGs  6/22: On Nasal Cannula 8L currently - AVAPS overnight  Wean oxygen as tolerated. Keep O2 sat 90-92%  Solumedrol 40mg q12h - tapering   Pulmicort 0.5 mg Neb BID  Brovana 15 mcg Neb BID  Albuterol scheduled q6h with Hypertonic saline q6 for secretions  Polymicrobial sputum culture   - continue 1g Ancef q8h     Cardiovascular   Hypertension   - Initially hypotensive requiring pressors. No hypertensive. On home antihypertensives (Lisinopril and HCTZ).  PRN Hydralazine  Lovenox for DVT ppx     Gastrointestinal   Protonix for GI ppx  Lactic acidosis - resolved  Dental soft diet    Renal   Renal function wnl  Continue to monitor with BMP  Monitor electrolytes closely and replete as necessary   Parra in place  with appropriate urine output     Infectious Disease   Respiratory culture polymicrobial and sensitive to Cefazolin  Ancef as above  Resp molecular panel negative   TMax 99.5  Leukocytosis              - 6/22: 19, trended up from yesterday              - Continue to trend daily  Tip culture prelim negative  OK to continue with peripheral lines     Hematology/Oncology   Anemia  6/22: Hgb 9.8 - stable, trending up  No signs of active bleeding  Monitor closely        Endocrine   No known history of diabetes  Monitor blood sugar closely, on steroids              - 6/22: 91   Continue to monitor     Social/Spiritual/DNR/Other   CODE STATUS: Full  Social Work Consult - Patient is requesting to leave today due to issues with Children. Patient is adamant that he needs to leave today. Had extensive conversation about how this would be incredibly dangerous and he would be at extreme risk of respiratory failure were he to leave now. Assured him that he is making slow improvements but is not safe to leave. Counseled on his deconditioning due to being bed bound and intubated. Family present in the room and attempting to assist with assurance that patient should stay. Despite all assurance that he will not be able to ambulate or adequately oxygenate he remains adamant that he wishes to leave. He is alert and oriented x3 but does not give good insight into why he needs to leave, nor does he have a plan for successful management of his condition upon leaving. Discussion is ongoing. Primary attending also involved. PLAN:     WEAN PER PROTOCOL:  [] No   [] Yes  [x] N/A    DISCONTINUE ANY LABS:   [x] No   [] Yes    ICU PROPHYLAXIS:  Stress ulcer:  [x] PPI Agent  [] [id number] [] Sucralfate  [] Other:  VTE:   [x] Enoxaparin  [] Unfract.  Heparin

## 2020-06-22 NOTE — CARE COORDINATION
COVID negative 6/14. Extubated 6/20- O2 weaned to 6liters high flow NC. On iv abx. Reported pt wants to leave AMA per nursing. Has home trilogy through [place] [employer] [place]. Home O2 3-5 liters. Recommending PT/OT when able and agreeable to assist with discharge planning .  Will follow Elan Blank

## 2020-06-22 NOTE — PROGRESS NOTES
RN rounded on patient at 5956. Pt in bed with HOB 45 degrees. Patient verbalizes wishes of signing out of hospital AMA. Educated on risks of leaving hospital against medical advice, including death. Patient states that his son will be at hospital at 0900 to pick him up. RN advised patient against this and instructed him to call for assistance prior to attempting to leave facility. Dr. David Call notified of patient wishes to leave AMA. At [address], RN approaching patient room. Patient sitting at side of bed, climbs out of bed and is found on R side of bed with right knee on ground and left foot on ground, kneeling at side of bed. Oxygen removed by patient at this time. Pulse ox immediately drops to 56% on room air. Oxygen reapplied and increased to 15L. RN calls for help immediately. Multiple RNs and physician arrive to bedside. Patient assisted back to bed and positioned comfortably in bed. Pulse ox 100% and flow rate titrated back down to 8L NC. Pt tolerated. Patient denies any pain, including to bilateral knees. No bruising, redness, or swelling noted to knees. Patient denies SOB at this time. Bed alarm activated. Upon patient return to bed, patient's son walks out of patient's bathroom. Notified son that patient was found on floor. Educated patient and patient's son that he must stay in bed unless assisted by staff member d/t high risk of falls. Dr. David Call, Dr. Andrew Winkler, and Dr. Lakeshia Morton notified of patient fall.

## 2020-06-22 NOTE — PROGRESS NOTES
hypoxia and hypercapnia (HCC)  Active Problems:    Hypertension    COPD exacerbation (HCC)    Macrocytic anemia  Resolved Problems:    * No resolved hospital problems. *      Plan:  1. Acute metabolic encephalopathy likely combination of hypoxia, hypercapnia in context of receiving steroids. Wean steroids and continue respiratory management to improve overall  2. Acute on chronic respiratory failure with hypoxia and hypercapnia s/p recent extubation wean oxygen as able critical care/pulm following  3. Copd exacerbation continue tx and to wean steroids as able nebs. pulm following  4.  Bacterial pneumonia continue abx  5. htn monitor bp and tx    Chart reviewed and updated by nursing    Time spent is 45 min        Electronically signed by Luis Daniel Bowers DO on 6/22/2020 at 7:42 PM

## 2020-06-22 NOTE — PROGRESS NOTES
I spoke with patient and family with regards to the medical necessity to stay in hospital to continue to get needed medical care. He remains on oxygen. Patient desired to leave AMA but he was urged not to leave AMA at this time. I will continue to speak with the patient and family. Primary care team updated with regards to status.     Odalys Hash  6/22/2020  9:48 AM

## 2020-06-23 LAB
ANION GAP SERPL CALCULATED.3IONS-SCNC: 6 MMOL/L (ref 7–16)
BASOPHILS ABSOLUTE: 0.02 E9/L (ref 0–0.2)
BASOPHILS RELATIVE PERCENT: 0.1 % (ref 0–2)
BUN BLDV-MCNC: 14 MG/DL (ref 8–23)
CALCIUM SERPL-MCNC: 9 MG/DL (ref 8.6–10.2)
CHLORIDE BLD-SCNC: 90 MMOL/L (ref 98–107)
CO2: 45 MMOL/L (ref 22–29)
CREAT SERPL-MCNC: 0.7 MG/DL (ref 0.7–1.2)
EOSINOPHILS ABSOLUTE: 0.03 E9/L (ref 0.05–0.5)
EOSINOPHILS RELATIVE PERCENT: 0.2 % (ref 0–6)
GFR AFRICAN AMERICAN: >60
GFR NON-AFRICAN AMERICAN: >60 ML/MIN/1.73
GLUCOSE BLD-MCNC: 103 MG/DL (ref 74–99)
HCT VFR BLD CALC: 36.3 % (ref 37–54)
HEMOGLOBIN: 10.8 G/DL (ref 12.5–16.5)
IMMATURE GRANULOCYTES #: 0.09 E9/L
IMMATURE GRANULOCYTES %: 0.5 % (ref 0–5)
LYMPHOCYTES ABSOLUTE: 0.71 E9/L (ref 1.5–4)
LYMPHOCYTES RELATIVE PERCENT: 4.1 % (ref 20–42)
MAGNESIUM: 2.1 MG/DL (ref 1.6–2.6)
MCH RBC QN AUTO: 30.7 PG (ref 26–35)
MCHC RBC AUTO-ENTMCNC: 29.8 % (ref 32–34.5)
MCV RBC AUTO: 103.1 FL (ref 80–99.9)
MONOCYTES ABSOLUTE: 1.57 E9/L (ref 0.1–0.95)
MONOCYTES RELATIVE PERCENT: 9.1 % (ref 2–12)
NEUTROPHILS ABSOLUTE: 14.75 E9/L (ref 1.8–7.3)
NEUTROPHILS RELATIVE PERCENT: 86 % (ref 43–80)
PDW BLD-RTO: 12.5 FL (ref 11.5–15)
PHOSPHORUS: 4.1 MG/DL (ref 2.5–4.5)
PLATELET # BLD: 409 E9/L (ref 130–450)
PMV BLD AUTO: 9.7 FL (ref 7–12)
POIKILOCYTES: ABNORMAL
POTASSIUM SERPL-SCNC: 4.2 MMOL/L (ref 3.5–5)
RBC # BLD: 3.52 E12/L (ref 3.8–5.8)
SODIUM BLD-SCNC: 141 MMOL/L (ref 132–146)
STOMATOCYTES: ABNORMAL
WBC # BLD: 17.2 E9/L (ref 4.5–11.5)

## 2020-06-23 PROCEDURE — 2580000003 HC RX 258: Performed by: INTERNAL MEDICINE

## 2020-06-23 PROCEDURE — 6370000000 HC RX 637 (ALT 250 FOR IP): Performed by: INTERNAL MEDICINE

## 2020-06-23 PROCEDURE — 99233 SBSQ HOSP IP/OBS HIGH 50: CPT | Performed by: INTERNAL MEDICINE

## 2020-06-23 PROCEDURE — 85025 COMPLETE CBC W/AUTO DIFF WBC: CPT

## 2020-06-23 PROCEDURE — 92526 ORAL FUNCTION THERAPY: CPT | Performed by: SPEECH-LANGUAGE PATHOLOGIST

## 2020-06-23 PROCEDURE — 97165 OT EVAL LOW COMPLEX 30 MIN: CPT

## 2020-06-23 PROCEDURE — 2700000000 HC OXYGEN THERAPY PER DAY

## 2020-06-23 PROCEDURE — 6360000002 HC RX W HCPCS: Performed by: INTERNAL MEDICINE

## 2020-06-23 PROCEDURE — 80048 BASIC METABOLIC PNL TOTAL CA: CPT

## 2020-06-23 PROCEDURE — 84100 ASSAY OF PHOSPHORUS: CPT

## 2020-06-23 PROCEDURE — 2060000000 HC ICU INTERMEDIATE R&B

## 2020-06-23 PROCEDURE — 6360000002 HC RX W HCPCS: Performed by: FAMILY MEDICINE

## 2020-06-23 PROCEDURE — 36415 COLL VENOUS BLD VENIPUNCTURE: CPT

## 2020-06-23 PROCEDURE — 83735 ASSAY OF MAGNESIUM: CPT

## 2020-06-23 PROCEDURE — 99221 1ST HOSP IP/OBS SF/LOW 40: CPT | Performed by: PSYCHIATRY & NEUROLOGY

## 2020-06-23 PROCEDURE — 2580000003 HC RX 258: Performed by: FAMILY MEDICINE

## 2020-06-23 PROCEDURE — 94660 CPAP INITIATION&MGMT: CPT

## 2020-06-23 PROCEDURE — 97161 PT EVAL LOW COMPLEX 20 MIN: CPT

## 2020-06-23 PROCEDURE — 94640 AIRWAY INHALATION TREATMENT: CPT

## 2020-06-23 RX ORDER — METHYLPREDNISOLONE SODIUM SUCCINATE 40 MG/ML
30 INJECTION, POWDER, LYOPHILIZED, FOR SOLUTION INTRAMUSCULAR; INTRAVENOUS EVERY 12 HOURS
Status: DISCONTINUED | OUTPATIENT
Start: 2020-06-23 | End: 2020-06-24

## 2020-06-23 RX ADMIN — LISINOPRIL 20 MG: 20 TABLET ORAL at 08:30

## 2020-06-23 RX ADMIN — HYDROCHLOROTHIAZIDE 25 MG: 25 TABLET ORAL at 08:30

## 2020-06-23 RX ADMIN — ENOXAPARIN SODIUM 40 MG: 40 INJECTION SUBCUTANEOUS at 08:30

## 2020-06-23 RX ADMIN — METHYLPREDNISOLONE SODIUM SUCCINATE 30 MG: 40 INJECTION, POWDER, LYOPHILIZED, FOR SOLUTION INTRAMUSCULAR; INTRAVENOUS at 18:01

## 2020-06-23 RX ADMIN — SODIUM CHLORIDE SOLN NEBU 3% 4 ML: 3 NEBU SOLN at 07:22

## 2020-06-23 RX ADMIN — WATER 1 G: 1 INJECTION INTRAMUSCULAR; INTRAVENOUS; SUBCUTANEOUS at 17:11

## 2020-06-23 RX ADMIN — ALBUTEROL SULFATE 2.5 MG: 2.5 SOLUTION RESPIRATORY (INHALATION) at 07:22

## 2020-06-23 RX ADMIN — Medication 10 ML: at 22:00

## 2020-06-23 RX ADMIN — ALBUTEROL SULFATE 2.5 MG: 2.5 SOLUTION RESPIRATORY (INHALATION) at 19:32

## 2020-06-23 RX ADMIN — ALBUTEROL SULFATE 2.5 MG: 2.5 SOLUTION RESPIRATORY (INHALATION) at 11:26

## 2020-06-23 RX ADMIN — SODIUM CHLORIDE SOLN NEBU 3% 4 ML: 3 NEBU SOLN at 11:26

## 2020-06-23 RX ADMIN — BUDESONIDE 500 MCG: 0.5 SUSPENSION RESPIRATORY (INHALATION) at 19:32

## 2020-06-23 RX ADMIN — SODIUM CHLORIDE SOLN NEBU 3% 4 ML: 3 NEBU SOLN at 16:08

## 2020-06-23 RX ADMIN — METHYLPREDNISOLONE SODIUM SUCCINATE 40 MG: 40 INJECTION, POWDER, LYOPHILIZED, FOR SOLUTION INTRAMUSCULAR; INTRAVENOUS at 05:56

## 2020-06-23 RX ADMIN — PANTOPRAZOLE SODIUM 40 MG: 40 TABLET, DELAYED RELEASE ORAL at 08:30

## 2020-06-23 RX ADMIN — ARFORMOTEROL TARTRATE 15 MCG: 15 SOLUTION RESPIRATORY (INHALATION) at 07:22

## 2020-06-23 RX ADMIN — ARFORMOTEROL TARTRATE 15 MCG: 15 SOLUTION RESPIRATORY (INHALATION) at 19:32

## 2020-06-23 RX ADMIN — WATER 1 G: 1 INJECTION INTRAMUSCULAR; INTRAVENOUS; SUBCUTANEOUS at 09:26

## 2020-06-23 RX ADMIN — Medication 10 ML: at 08:30

## 2020-06-23 RX ADMIN — SODIUM CHLORIDE SOLN NEBU 3% 4 ML: 3 NEBU SOLN at 19:32

## 2020-06-23 RX ADMIN — ALBUTEROL SULFATE 2.5 MG: 2.5 SOLUTION RESPIRATORY (INHALATION) at 16:08

## 2020-06-23 RX ADMIN — WATER 1 G: 1 INJECTION INTRAMUSCULAR; INTRAVENOUS; SUBCUTANEOUS at 01:00

## 2020-06-23 RX ADMIN — BUDESONIDE 500 MCG: 0.5 SUSPENSION RESPIRATORY (INHALATION) at 07:22

## 2020-06-23 ASSESSMENT — PAIN SCALES - GENERAL
PAINLEVEL_OUTOF10: 0

## 2020-06-23 NOTE — PROGRESS NOTES
Critical Care Team - Daily Progress Note      Date and time: 6/23/2020 8:09 AM  Patient's name:  Arleth Ken  Medical Record Number: [id number]  Patient's account/billing number: [de-identified]  Patient's YOB: 1960  Age: 60 y.o. Date of Admission: 6/14/2020 10:52 PM  Length of stay during current admission: 9        Primary Care Physician: Leanne Rayo DO  ICU Attending Physician: Dr. Tamanna Pierre    Code Status: Full Code    Reason for ICU admission: Acute hypercapnic respiratory failure       SUBJECTIVE:     OVERNIGHT EVENTS:       On 3L nasal cannula. Tolerating. No significant overnight events. Used AVAPS overnight. Requesting to leave again this morning.     AWAKE & FOLLOWING COMMANDS:  [] No   [x] Yes    CURRENT VENTILATION STATUS:     [] Ventilator  [] BIPAP   [x] Nasal Cannula [] Room Air      IF INTUBATED, ET TUBE MARKING AT LOWER LIP:       cms    SECRETIONS Amount:  [x] Small [] Moderate  [] Large  [] None  Color:     [] White [] Colored  [] Bloody        SEDATION:  RAAS Score: 0  [] Propofol gtt  [] Versed gtt  [] Ativan gtt   [x] No Sedation     PARALYZED:  [x] No    [] Yes    DIARRHEA:                [x] No                [] Yes  (C. Difficile status: [] positive                                                                                                                       [] negative                                                                                                                     [] pending)    VASOPRESSORS:  [x] No    [] Yes    If yes -   [] Levophed       [] Dopamine     [] Vasopressin       [] Dobutamine  [] Phenylephrine         [] Epinephrine    CENTRAL LINES:     [x] No   [] Yes   (Date of Insertion: Removed 6/20   )           If yes -     [] Right IJ     [] Left IJ [] Right Femoral [] Left Femoral                   [] Right Subclavian [] Left Subclavian        CUEVAS CATHETER:   [] No   [x] Yes  (Date of Insertion: 6/14  )     URINE OUTPUT:            [x] Good 06/23/20  0728    140 141   K 4.5 3.7 4.2   CL 94* 93* 90*   CO2 41* 41* 45*   BUN 25* 19 14   CREATININE 0.7 0.7 0.7   GLUCOSE 134* 91 103*   CALCIUM 8.9 9.0 9.0      Magnesium:   Lab Results   Component Value Date    MG 2.1 06/23/2020     Phosphorus:   Lab Results   Component Value Date    PHOS 4.1 06/23/2020     Ionized Calcium: No results found for: CAION     Urinalysis:     Troponin:   No results for input(s): TROPONINI in the last 72 hours. Microbiology:     Cultures during this admission:     Blood cultures:                 [x] None drawn      [] Negative             []  Positive (Details:  )  Urine Culture:                   [x] None drawn      [] Negative             []  Positive (Details:  )  Sputum Culture:  Collected 6/16             [] None drawn       [] Negative             [x]  Positive (Details: Staph aureus, Klebsiella and Proteus)   Endotracheal aspirate:     [x] None drawn       [] Negative             []  Positive (Details:  )     Other pertinent Labs:   Respiratory molecular panel on 6/15: negative  MRSA screen negative on 6/15    Radiology/Imaging:     XR CHEST PORTABLE   Final Result   1. Bibasilar airspace disease compatible with atelectasis or pneumonia   2. Stable positioning of support lines and tubes   3. Questionable small right pleural effusion. This study was dictated by Jose Raul Nix PA-C and Keyana Hathaway MD    reviewed and concurred with the findings. XR CHEST PORTABLE   Final Result   1. Persistent bibasilar airspace disease compatible with atelectasis   or pneumonia   2. Stable positioning of support lines and tubes. 3. Small bilateral pleural effusions are noted. This study was dictated by Jose Raul Nix PA-C and Keyana Hathaway MD    reviewed and concurred with the findings. XR CHEST PORTABLE   Final Result   Stable life-support devices as described above.    No significant interval change in the bibasilar air space disease from   the Protonix for GI ppx  Lactic acidosis - resolved   Dental soft diet    Renal   Renal function wnl  Continue to monitor with BMP  Monitor electrolytes closely and replete as necessary   OK to remove asher     Infectious Disease   Respiratory culture polymicrobial and sensitive to Cefazolin  Ancef as above day 5  Resp molecular panel negative   TMax 99.5  Leukocytosis              - 6/23: 17.2, trended down from yesterday              - Continue to trend daily  Tip culture negative  OK to continue with peripheral lines     Hematology/Oncology   Anemia  6/23: Hgb 10.8 - trending up  No signs of active bleeding  Monitor closely        Endocrine   No known history of diabetes  Monitor blood sugar closely, on steroids              - 6/23: 103   Continue to monitor     Social/Spiritual/DNR/Other   CODE STATUS: Full  Social Work Consult - Patient is requesting to leave today due to issues with Children. Psych consult requested    Patient is adamant that he needs to leave today again. Reassured that it would be unsafe to do so. He is unable to walk due to his long stay in the ICU and deconditioning. He provides no plan or insight into how he intends to leave or how he will take care of himself at home. Provides no reasoning into why he needs to leave. Patient is visibly frustrated. PLAN:     WEAN PER PROTOCOL:  [] No   [] Yes  [x] N/A    DISCONTINUE ANY LABS:   [x] No   [] Yes    ICU PROPHYLAXIS:  Stress ulcer:  [x] PPI Agent  [] [id number] [] Sucralfate  [] Other:  VTE:   [x] Enoxaparin  [] Unfract.  Heparin Subcut  [] EPC Cuffs    NUTRITION:  [] NPO [] Tube Feeding [] TPN  [x] PO (Diet: DIET DENTAL SOFT;)    HOME MEDICATIONS RECONCILED: [] No  [x] Yes    INSULIN DRIP:   [x] No   [] Yes     CONSULTATION NEEDED:  [x] No   [] Yes    FAMILY UPDATED:    [] No   [x] Yes    TRANSFER OUT OF ICU:   [] No   [x] Yes    ADDITIONAL PLAN: Transfer out of ICU today    Lizzy Acosta MD, PGY-1                    6/23/2020, 8:09 AM Attending Physician Attestation: Dr. Way Ettrick    Thank you very much for allowing me to see this patient in consultation and follow up. I personally saw, examined and provided care for the patient. Radiographs, labs and medication list were reviewed by me independently. I spoke with bedside nursing, respiratory therapists and consultants. Critical care services and times documented are independent of procedures and multidisciplinary rounds with Residents. Additionally comprehensive, multidisciplinary rounds were conducted with the MICU team. The case was discussed in detail and plans for care were established. Review of Residents documentation was conducted and revisions were made as appropriate. I agree with the the above documented information. Physical Examination:  Vitals:   Vitals:    06/23/20 1000 06/23/20 1100 06/23/20 1126 06/23/20 1330   BP: (!) 166/80 (!) 166/80  (!) 148/68   Pulse: 103 111  105   Resp: 20 20 18   Temp:    99.2 °F (37.3 °C)   TempSrc:    Oral   SpO2: 94% 91% 92% 99%   Weight:       Height:          General: No Acute Distress  HEENT: normocephalic, atruamatic  CVS: RRR, S1, S2, No S3 or S4  Respiratory: decreased breath sounds at lung bases, no focal wheezes noted  Extremities: no clubbing/cyanosis/edema    ASSESSMENT:  1.) Acute on Chronic Hypoxic and Hypercapnic Respiratory Failure - secondary to COPD Exacerbation   2.) COPD Exacerbation - Polymicrobial B/L Pneumonia on Cefazolin  3.) Hypertension   4.) NOn-Compliance      In addition the following applies:     Check: N/A  Medication Alterations: N/A  Procedures: N/A  Imaging: reviewed  New Consultations: N/A  VENT: N/A     Access: Peripheral   Consults: Pulmonary   Drips: N/A  Nutrition: PO  ABX: Cefazolin     - patient OK to transfer to floors with Dr. Rodger Spurling to assume pulmonary care once on floors with critical care to sign off     Thank you for allowing me to participate in the care of this patient.     Care reviewed with nursing staff, medical and surgical specialty care, primary care and the patient's family as available. Restraints are ordered when the patient can do harm to him/herself by pulling out devices. Leo Marmolejo M.D.     Leo Marmolejo  6/23/2020  1:54 PM

## 2020-06-23 NOTE — PLAN OF CARE
Problem: Falls - Risk of:  Goal: Will remain free from falls  Description: Will remain free from falls  Outcome: Met This Shift  Goal: Absence of physical injury  Description: Absence of physical injury  Outcome: Met This Shift     Problem: Airway Clearance - Ineffective:  Goal: Ability to maintain a clear airway will improve  Description: Ability to maintain a clear airway will improve  Outcome: Met This Shift     Problem: Anxiety/Stress:  Goal: Level of anxiety will decrease  Description: Level of anxiety will decrease  Outcome: Ongoing     Problem: Aspiration:  Goal: Absence of aspiration  Description: Absence of aspiration  Outcome: Met This Shift     Problem: Fluid Volume - Imbalance:  Goal: Absence of imbalanced fluid volume signs and symptoms  Description: Absence of imbalanced fluid volume signs and symptoms  Outcome: Met This Shift     Problem: Gas Exchange - Impaired:  Goal: Levels of oxygenation will improve  Description: Levels of oxygenation will improve  Outcome: Ongoing     Problem: Mental Status - Impaired:  Goal: Mental status will be restored to baseline  Description: Mental status will be restored to baseline  Outcome: Met This Shift     Problem: Pain:  Goal: Pain level will decrease  Description: Pain level will decrease  Outcome: Met This Shift     Problem: Pain:  Goal: Pain level will decrease  Description: Pain level will decrease  Outcome: Met This Shift

## 2020-06-23 NOTE — PROGRESS NOTES
SPEECH LANGUAGE PATHOLOGY  DAILY PROGRESS NOTE        PATIENT NAME:  Shelbi Ness      :  1960          TODAY'S DATE:  2020 ROOM:  [facility]-S        SWALLOWING  Pt reports toleration of current diet. Pt presented with thin liquid by cup sip. No s/s of aspiration observed, however pt is at risk of aspiration due to respiratory status. Breathy vocal quality and dysphonia was noted. Will continue to assess and monitor diet. DYSPHAGIA DIAGNOSIS:  Oral and pharyngeal swallow clinically WNL during evaluation, however due to pt respiratory status SLP will continue to assess. Silent aspiration can not be r/o bedside. DIET RECOMMENDATIONS:  Dysphagia 3, Soft/advanced (Soft & Bite-sized) solids with thin liquids                 FEEDING RECOMMENDATIONS:                           Assistance level:  Stand by assistance is needed during all oral intake as needed due to current respiratory status                             Compensatory strategies recommended: Small bites/sips, Alternate solids and liquids, and seated upright during meals        Oral- adequate labial seal and A-P transfer, no oral residue      Pharyngeal- No overt s/s of aspiration, no multiple swallows      Intervention/Education- Pt educated on results and POC. Pt trained on compensatory strategies for safe swallow with good outcome. Pt also trained on swallowing strategies for individuals with respiratory disease. Handout provided. Questions answered. Will continue SP intervention as per previously established POC. Kaity Kingston  Student Speech Pathologist    Linnette Meneses. ALF. [facility]/SLP [id number]  Speech Pathologist              CPT code(s) 53123  dysphagia tx  Total minutes :  15 minutes

## 2020-06-23 NOTE — PROGRESS NOTES
ASSESSMENT:    Comments:  Pt found in bed. Nursing okayed pt for out of bed functional mobility. Pt reported feeling dizzy with all functional mobilier. Cueing required for hand placement during transfers. Pt with bilateral knee instability when standing. At end of eval, pt left in bed with call light in reach. Pt's/ family goals   1. None stated    Patient and or family understand(s) diagnosis, prognosis, and plan of care. yes    PLAN:    PT care will be provided in accordance with the objectives noted above. Exercises and functional mobility practice will be used as well as appropriate assistive devices or modalities to obtain goals. Patient and family education will also be administered as needed. Frequency of treatments: 2-5x/week x 1-2 weeks. Time in  1144  Time out  1200      Evaluation Time includes thorough review of current medical information, gathering information on past medical history/social history and prior level of function, completion of standardized testing/informal observation of tasks, assessment of data and education on plan of care and goals.     CPT codes:  [x] Low Complexity PT evaluation 07187  [] Moderate Complexity PT evaluation 65833  [] High Complexity PT evaluation 48547  [] PT Re-evaluation 98223  [] Gait training 91037 _ minutes  [] Therapeutic activities 48484 _ minutes  [] Therapeutic exercises [address] [facility] [place], [place] [id number]

## 2020-06-23 NOTE — PROGRESS NOTES
Pt. Awake alert and oriented x3 VSS Pt. Eating and drinking without issue. Pt. To be transferred to [facility], report called to Dotty Turner RN, Pt. And OT at the bedside. Pt. Agreeable with plan of care. Parra catheter removed emptied for 750cc of clear yellow urine.

## 2020-06-23 NOTE — CONSULTS
PSYCHIATRY ATTENDING CONSULT    REASON FOR CONSULT:  Capacity assessment    REQUESTING PHYSICIAN:  Dr. Tapia Jersey: \"I have three kids at home. \"    HISTORY OF PRESENT ILLNESS:  Tejas Fontenot is a 60 y.o. male with no apparent psychiatric history who was admitted on 6/14/20 due to acute on chronic respiratory failure. Chart reviewed. Patient required mechanical ventilation for close to a week and was extubated 6/20. Notes from last two days reflect he has been eager to leave the hospital and mentioned signing out AMA. Ultimately psychiatric consultation was requested to assess decision-making capacity. Currently patient is alert, oriented and appropriate. Renuka Sutton expresses desire to get home as soon as possible as he has three children there ages 8, 15 and 13 that he is the single parent of. He reports right now a \"friend\" is staying with them but is \"not the most reliable\". Patient demonstrated general understanding of health circumstances and risks of leaving the hospital prematurely. He also says he has a home health aide that comes in to help him. He denies any psychiatric complaints at this time. He is not suicidal or homicidal. No perceptual disturbances. PAST PSYCHIATRIC HISTORY:  Denies. PAST MEDICAL HISTORY:       Diagnosis Date    COPD (chronic obstructive pulmonary disease) ([facility] [address]     Hypertension      PAST SURGICAL HISTORY: No past surgical history on file.     MEDICATIONS: Current Facility-Administered Medications: methylPREDNISolone sodium (SOLU-MEDROL) injection 30 mg, 30 mg, Intravenous, Q12H  pantoprazole (PROTONIX) tablet 40 mg, 40 mg, Oral, QAM AC  hydrALAZINE (APRESOLINE) injection 10 mg, 10 mg, Intravenous, Q6H PRN  labetalol (NORMODYNE;TRANDATE) injection 10 mg, 10 mg, Intravenous, Q6H PRN  lisinopril (PRINIVIL;ZESTRIL) tablet 20 mg, 20 mg, Oral, Daily  hydroCHLOROthiazide (HYDRODIURIL) tablet 25 mg, 25 mg, Oral, Daily  ceFAZolin (ANCEF) 1 g in sterile water 10 mL IV syringe, 1

## 2020-06-23 NOTE — PLAN OF CARE
Problem: Falls - Risk of:  Goal: Will remain free from falls  Description: Will remain free from falls  Outcome: Met This Shift     Problem: Aspiration:  Goal: Absence of aspiration  Description: Absence of aspiration  Outcome: Met This Shift     Problem: Fluid Volume - Imbalance:  Goal: Absence of imbalanced fluid volume signs and symptoms  Description: Absence of imbalanced fluid volume signs and symptoms  Outcome: Met This Shift     Problem: Gas Exchange - Impaired:  Goal: Levels of oxygenation will improve  Description: Levels of oxygenation will improve  Outcome: Met This Shift     Problem: Pain:  Goal: Pain level will decrease  Description: Pain level will decrease  Outcome: Met This Shift     Problem: Pain:  Goal: Pain level will decrease  Description: Pain level will decrease  Outcome: Met This Shift     Problem: Airway Clearance - Ineffective:  Goal: Ability to maintain a clear airway will improve  Description: Ability to maintain a clear airway will improve  Outcome: Ongoing     Problem: Mental Status - Impaired:  Goal: Mental status will be restored to baseline  Description: Mental status will be restored to baseline  Outcome: Ongoing     Problem: Anxiety/Stress:  Goal: Level of anxiety will decrease  Description: Level of anxiety will decrease  Outcome: Not Met This Shift

## 2020-06-23 NOTE — PROGRESS NOTES
Reena Crews Hospitalist   Progress Note    Admitting Date and Time: 6/14/2020 10:52 PM  Admit Dx: Respiratory failure ([facility] [address] [J96.90]    Pt seen in icu    Subjective:    Patient was admitted with Respiratory failure ([facility] [address] [J96.90]. Patient denies fever, chills, cp, sob, n/v.     methylPREDNISolone  30 mg Intravenous Q12H    pantoprazole  40 mg Oral QAM AC    lisinopril  20 mg Oral Daily    hydroCHLOROthiazide  25 mg Oral Daily    ceFAZolin  1 g Intravenous Q8H    albuterol  2.5 mg Nebulization 4x daily    sodium chloride (Inhalant)  4 mL Nebulization 4x daily    sodium chloride flush  10 mL Intravenous 2 times per day    enoxaparin  40 mg Subcutaneous Daily    Arformoterol Tartrate  15 mcg Nebulization BID    budesonide  0.5 mg Nebulization BID     hydrALAZINE, 10 mg, Q6H PRN  labetalol, 10 mg, Q6H PRN  sodium chloride flush, 10 mL, PRN  acetaminophen, 650 mg, Q6H PRN    Or  acetaminophen, 650 mg, Q6H PRN  magnesium hydroxide, 30 mL, Daily PRN  promethazine, 12.5 mg, Q6H PRN    Or  ondansetron, 4 mg, Q6H PRN         Objective:          PHYSICAL EXAM:    Vitals:  BP (!) 148/68   Pulse 105   Temp 99.2 °F (37.3 °C) (Oral)   Resp 18   Ht 6' 2\" (1.88 m)   Wt 235 lb 7.2 oz (106.8 kg)   SpO2 99%   BMI 30.23 kg/m²     General:  Appears comfortable. Answers questions appropriately and cooperative with exam  HEENT:  Mucous membranes moist. No erythema, rhinorrhea, or post-nasal drip noted. Neck:  No carotid bruits. Heart:  Rhythm regular at rate of 100  Lungs:  CTA but diminished. No wheeze, rales, or rhonchi  Abdomen:  Positive bowel sounds positive. Soft. Non-tender. No guarding, rebound or rigidity. Breast/Rectal/Genitourinary: not pertinent. Extremities:  Negative for lower extremity edema  Skin:  Warm and dry  Vascular: 2/4 Dorsalis Pedis pulses bilaterally. Neuro:  Cranial nerves 2-12 grossly intact, no focal weakness or change in sensation noted. Extraocular muscles intact. chronic; however,    pyelonephritis cannot be excluded and urinalysis may be helpful if clinically    indicated. 4. Bilateral renal cysts. Low-attenuation poorly defined focus lower pole left    kidney is indeterminate and appears to have been present previously. Recommend    follow-up non urgent CT or MRI using renal protocol. 5. Left-sided femoral venous central catheter has its tip in the distal aspect    of the left common femoral vein. 6. Additional nonacute findings as described above. THIS REPORT CONTAINS FINDINGS THAT MAY BE CRITICAL TO PATIENT CARE. The    findings were verbally communicated via telephone conference with Verónica Doshi    at 3:46 AM EDT on 6/15/2020. The findings were acknowledged and understood. COMMENTS:    Consistent with the [employer]'s Incidental Findings    Committee white paper Liya Smithian Am Shirlene Radiol 2018): Any incidental renal lesion less    than 1.0 cm or classified as too small to characterize, or any incidental    cystic renal lesion characterized as simple-appearing, is likely benign. No    follow-up imaging is recommended for these lesions per consensus    recommendations based on imaging criteria. This report has been electronically signed by Sherlyn Claros MD.      XR CHEST PORTABLE   Final Result      Chronic obstructive pulmonary disease      Support lines in satisfactory position      Unchanged from prior study with no evidence of airspace consolidation   or pulmonary venous congestion. Assessment:    Principal Problem:    Acute on chronic respiratory failure with hypoxia and hypercapnia (HCC)  Active Problems:    Hypertension    COPD exacerbation (HCC)    Macrocytic anemia  Resolved Problems:    * No resolved hospital problems. *      Plan:  1. Acute metabolic encephalopathy likely combination of hypoxia, hypercapnia in context of receiving steroids. Wean steroids and continue respiratory management to improve overall.

## 2020-06-23 NOTE — PROGRESS NOTES
6/23/2020  11:29 AM      Nutrition Assessment    Type and Reason for Visit: Reassess    Nutrition Recommendations: Continue current diet and ONS, as juan manuel    Nutrition Assessment: Pt medically improving. Extubated and on Dental soft diet. Transferring today out of ICU. Will add ONS to optimize intake    Malnutrition Assessment:  · Malnutrition Status: At risk for malnutrition  · Context: Acute illness or injury  · Findings of the 6 clinical characteristics of malnutrition (Minimum of 2 out of 6 clinical characteristics is required to make the diagnosis of moderate or severe Protein Calorie Malnutrition based on AND/ASPEN Guidelines):  1. Energy Intake-Less than or equal to 50% of estimated energy requirement, (less than 5 d)    2. Weight Loss-No significant weight loss, in 3 months  3. Fat Loss-No significant subcutaneous fat loss,    4. Muscle Loss-No significant muscle mass loss,    5. Fluid Accumulation-No significant fluid accumulation, Extremities(+2 edema w/multiple factors)  6.   Strength-Not measured    Nutrition Risk Level: Low    Nutrient Needs:  · Estimated Daily Total Kcal:  (MSJ x 1.25)  · Estimated Daily Protein (g): 115-130 (1.3-1.5 g/kg)  · Estimated Daily Total Fluid (ml/day):  (1 ml/jeana)    Nutrition Diagnosis:   · Problem: No nutrition diagnosis at this time    Objective Information:  · Nutrition-Focused Physical Findings: A&Ox4, poor dentition/teeth missing, abdomen WNL, +2 edema, -I/O  · Wound Type: None  · Current Nutrition Therapies:  · Oral Diet Orders: Dental Soft   · Oral Diet intake: 51-75%(per nursing)  · Anthropometric Measures:  · Ht: 6' 2\" (188 cm)   · Current Body Wt: 235 lb (106.6 kg)(6/23 bed- loss with -fluid balance)  · Admission Body Wt: 235 lb (106.6 kg)(6/14 first measured wt )  · Usual Body Wt: 220 lb (99.8 kg)(per EMR x 3 mo)  · % Weight Change:  ,  no significant changes in last 3 mo  · Ideal Body Wt: 190 lb (86.2 kg), % Ideal Body 124%  · BMI Classification:

## 2020-06-23 NOTE — PROGRESS NOTES
Occupational Therapy  OCCUPATIONAL THERAPY INITIAL EVALUATION      Date:2020  Patient Name: Janie Mckeon  MRN: [id number]  : 1960  Room: [facility]/020-A    Referring Provider: Erich Severin, MD  Evaluating OT: Mortimer Mylar. Kirstbony Mackey - .[phone]    AM-PAC Daily Activity Raw Score: 15/24      Recommended Adaptive Equipment: Continue to assess. Diagnosis: Respiratory failure ([address] [J96.90]    Patient intubated on 2020; patient extubated on 2020. Pertinent Medical History: COPD, HTN     Precautions: falls, O2 via nasal cannula, skin integrity    Home Living: Patient lives with his three children in a two-floor home; patient's bedroom and (onlyl) bathroom are on the second floor. Bathroom Setup: tub shower (no seat, no grab bars) on second floor    Prior Level of Function (PLOF): Per patient, he was independent with ADLs, needed assistance with IADLs, and independent with functional mobility (without device) prior to this hospitalization. Patient reported that he has assistance from an aide 3 days per week (3 hours per day) for completion of IADLs; patient also noted that his children \"help [him]\". Driving: Yes    Pain Level: Patient denied experiencing pain. Cognition: Patient alert and oriented grossly. WFL command follow demonstrated. Fair insight to current abilities/limitations demonstrated.    Memory: Fair+   Sequencing: Fair   Problem Solving: Fair   Judgement/Safety: Fair    Functional Assessment:   Initial Eval Status  Date: 2020 Treatment Status  Date:  Short Term Goals  Treatment Frequency/Duration: 2-5x/week for 3-7 days PRN   Feeding Setup  Independent   Grooming Min A  Mod I / Independent  (seated/standing)   UB Dressing Min A  Independent   LB Dressing Max A  Mod I / Independent - with use of AE, as needed/appropriate   Bathing Max A  Mod I / Independent - with use of AE/DME, as needed/appropriate   Toileting Max A  Mod I / Independent   Bed Mobility  Supine-to-Sit: Beryl Saini

## 2020-06-23 NOTE — PROGRESS NOTES
Date: 6/22/2020    Time: 10:28 PM    Patient Placed On BIPAP/CPAP/ Non-Invasive Ventilation? Yes    If no must comment. Facial area red/color change? No           If YES are Blister/Lesion present? No   If yes must notify nursing staff  BIPAP/CPAP skin barrier? Yes    Skin barrier type:duoderm       Comments: Pt placed on AVAPS for the night, new duoderm placed. Machine plugged into red outlet.         Pham Doctor      06/22/20 [id number]   NIV Type   Mode AVAPS   Mask Type Full face mask   Mask Size Large   Settings/Measurements   CPAP/EPAP 5 cmH2O   IPAP Min 18 cmH2O   IPAP Max 30 cmH2O   Vt Ordered 450 mL   Rate Ordered 12   Resp 18   FiO2  40 %   Vt Exhaled 482 mL   Minute Volume 9.1 Liters   Mask Leak (lpm) 39 lpm   Comfort Level Good   Using Accessory Muscles No   SpO2 97

## 2020-06-23 NOTE — PATIENT CARE CONFERENCE
Intensive Care Daily Quality Rounding Checklist        ICU Team Members:  Dr. Jacy Bernal, Mary  [facility] (residents), clinical pharmacist, charge nurse, bedside, respiratory therapist     ICU Day #: 10     Intubation Date: Extubated 6/20     Ventilator Day #: NA     Central Line Insertion Date: NA                             Day #: NA      Arterial Line Insertion Date:  N/A                             Day #: N/A     DVT Prophylaxis: Lovenox    GI Prophylaxis: Protonix     Parra Catheter Insertion Date: June 14, 2020                             Day #: 10                             Continued need (if yes, reason documented and discussed with physician): No     Skin Issues/ Wounds and ordered treatment discussed on rounds: no issues, SOS precautions     Goals/ Plans for the Day: Daily labs, wean o2 as able, AVAPS QHS and PRN, PT/OT, transfer out of ICU

## 2020-06-24 ENCOUNTER — APPOINTMENT (OUTPATIENT)
Dept: GENERAL RADIOLOGY | Age: 60
DRG: 130 | End: 2020-06-24
Attending: INTERNAL MEDICINE
Payer: MEDICAID

## 2020-06-24 ENCOUNTER — TELEPHONE (OUTPATIENT)
Dept: PRIMARY CARE CLINIC | Age: 60
End: 2020-06-24

## 2020-06-24 LAB
ANION GAP SERPL CALCULATED.3IONS-SCNC: 8 MMOL/L (ref 7–16)
BASOPHILIC STIPPLING: ABNORMAL
BASOPHILS ABSOLUTE: 0.02 E9/L (ref 0–0.2)
BASOPHILS RELATIVE PERCENT: 0.1 % (ref 0–2)
BUN BLDV-MCNC: 12 MG/DL (ref 8–23)
CALCIUM SERPL-MCNC: 8.8 MG/DL (ref 8.6–10.2)
CHLORIDE BLD-SCNC: 86 MMOL/L (ref 98–107)
CO2: 46 MMOL/L (ref 22–29)
CREAT SERPL-MCNC: 0.7 MG/DL (ref 0.7–1.2)
EOSINOPHILS ABSOLUTE: 0.02 E9/L (ref 0.05–0.5)
EOSINOPHILS RELATIVE PERCENT: 0.1 % (ref 0–6)
GFR AFRICAN AMERICAN: >60
GFR NON-AFRICAN AMERICAN: >60 ML/MIN/1.73
GLUCOSE BLD-MCNC: 109 MG/DL (ref 74–99)
HCT VFR BLD CALC: 32.6 % (ref 37–54)
HEMOGLOBIN: 9.8 G/DL (ref 12.5–16.5)
HYPOCHROMIA: ABNORMAL
IMMATURE GRANULOCYTES #: 0.1 E9/L
IMMATURE GRANULOCYTES %: 0.6 % (ref 0–5)
LYMPHOCYTES ABSOLUTE: 1.3 E9/L (ref 1.5–4)
LYMPHOCYTES RELATIVE PERCENT: 7.2 % (ref 20–42)
MAGNESIUM: 2 MG/DL (ref 1.6–2.6)
MCH RBC QN AUTO: 30.7 PG (ref 26–35)
MCHC RBC AUTO-ENTMCNC: 30.1 % (ref 32–34.5)
MCV RBC AUTO: 102.2 FL (ref 80–99.9)
MONOCYTES ABSOLUTE: 1.87 E9/L (ref 0.1–0.95)
MONOCYTES RELATIVE PERCENT: 10.4 % (ref 2–12)
NEUTROPHILS ABSOLUTE: 14.71 E9/L (ref 1.8–7.3)
NEUTROPHILS RELATIVE PERCENT: 81.6 % (ref 43–80)
OVALOCYTES: ABNORMAL
PDW BLD-RTO: 12.3 FL (ref 11.5–15)
PHOSPHORUS: 2.9 MG/DL (ref 2.5–4.5)
PLATELET # BLD: 366 E9/L (ref 130–450)
PMV BLD AUTO: 10.4 FL (ref 7–12)
POIKILOCYTES: ABNORMAL
POLYCHROMASIA: ABNORMAL
POTASSIUM SERPL-SCNC: 3.4 MMOL/L (ref 3.5–5)
RBC # BLD: 3.19 E12/L (ref 3.8–5.8)
SODIUM BLD-SCNC: 140 MMOL/L (ref 132–146)
STOMATOCYTES: ABNORMAL
WBC # BLD: 18 E9/L (ref 4.5–11.5)

## 2020-06-24 PROCEDURE — 2580000003 HC RX 258: Performed by: INTERNAL MEDICINE

## 2020-06-24 PROCEDURE — 97110 THERAPEUTIC EXERCISES: CPT

## 2020-06-24 PROCEDURE — 6360000002 HC RX W HCPCS: Performed by: INTERNAL MEDICINE

## 2020-06-24 PROCEDURE — 6370000000 HC RX 637 (ALT 250 FOR IP): Performed by: INTERNAL MEDICINE

## 2020-06-24 PROCEDURE — 94660 CPAP INITIATION&MGMT: CPT

## 2020-06-24 PROCEDURE — 92526 ORAL FUNCTION THERAPY: CPT | Performed by: SPEECH-LANGUAGE PATHOLOGIST

## 2020-06-24 PROCEDURE — 85025 COMPLETE CBC W/AUTO DIFF WBC: CPT

## 2020-06-24 PROCEDURE — 83735 ASSAY OF MAGNESIUM: CPT

## 2020-06-24 PROCEDURE — 84100 ASSAY OF PHOSPHORUS: CPT

## 2020-06-24 PROCEDURE — 80048 BASIC METABOLIC PNL TOTAL CA: CPT

## 2020-06-24 PROCEDURE — 74230 X-RAY XM SWLNG FUNCJ C+: CPT

## 2020-06-24 PROCEDURE — 99232 SBSQ HOSP IP/OBS MODERATE 35: CPT | Performed by: INTERNAL MEDICINE

## 2020-06-24 PROCEDURE — 2500000003 HC RX 250 WO HCPCS: Performed by: RADIOLOGY

## 2020-06-24 PROCEDURE — 36415 COLL VENOUS BLD VENIPUNCTURE: CPT

## 2020-06-24 PROCEDURE — 97530 THERAPEUTIC ACTIVITIES: CPT

## 2020-06-24 PROCEDURE — 92611 MOTION FLUOROSCOPY/SWALLOW: CPT | Performed by: SPEECH-LANGUAGE PATHOLOGIST

## 2020-06-24 PROCEDURE — 2060000000 HC ICU INTERMEDIATE R&B

## 2020-06-24 PROCEDURE — 2700000000 HC OXYGEN THERAPY PER DAY

## 2020-06-24 PROCEDURE — 94640 AIRWAY INHALATION TREATMENT: CPT

## 2020-06-24 RX ORDER — POTASSIUM CHLORIDE 20 MEQ/1
40 TABLET, EXTENDED RELEASE ORAL ONCE
Status: COMPLETED | OUTPATIENT
Start: 2020-06-24 | End: 2020-06-24

## 2020-06-24 RX ORDER — METHYLPREDNISOLONE SODIUM SUCCINATE 40 MG/ML
30 INJECTION, POWDER, LYOPHILIZED, FOR SOLUTION INTRAMUSCULAR; INTRAVENOUS DAILY
Status: DISCONTINUED | OUTPATIENT
Start: 2020-06-25 | End: 2020-06-24

## 2020-06-24 RX ADMIN — METHYLPREDNISOLONE SODIUM SUCCINATE 30 MG: 40 INJECTION, POWDER, LYOPHILIZED, FOR SOLUTION INTRAMUSCULAR; INTRAVENOUS at 17:28

## 2020-06-24 RX ADMIN — SODIUM CHLORIDE SOLN NEBU 3% 4 ML: 3 NEBU SOLN at 19:38

## 2020-06-24 RX ADMIN — POTASSIUM CHLORIDE 40 MEQ: 20 TABLET, EXTENDED RELEASE ORAL at 21:12

## 2020-06-24 RX ADMIN — Medication 10 ML: at 21:12

## 2020-06-24 RX ADMIN — SODIUM CHLORIDE SOLN NEBU 3% 4 ML: 3 NEBU SOLN at 08:49

## 2020-06-24 RX ADMIN — Medication 10 ML: at 09:44

## 2020-06-24 RX ADMIN — HYDROCHLOROTHIAZIDE 25 MG: 25 TABLET ORAL at 09:43

## 2020-06-24 RX ADMIN — WATER 1 G: 1 INJECTION INTRAMUSCULAR; INTRAVENOUS; SUBCUTANEOUS at 09:43

## 2020-06-24 RX ADMIN — ALBUTEROL SULFATE 2.5 MG: 2.5 SOLUTION RESPIRATORY (INHALATION) at 08:49

## 2020-06-24 RX ADMIN — BARIUM SULFATE 120 ML: 400 SUSPENSION ORAL at 12:04

## 2020-06-24 RX ADMIN — ALBUTEROL SULFATE 2.5 MG: 2.5 SOLUTION RESPIRATORY (INHALATION) at 17:11

## 2020-06-24 RX ADMIN — BARIUM SULFATE 70 G: 0.81 POWDER, FOR SUSPENSION ORAL at 12:04

## 2020-06-24 RX ADMIN — WATER 1 G: 1 INJECTION INTRAMUSCULAR; INTRAVENOUS; SUBCUTANEOUS at 17:28

## 2020-06-24 RX ADMIN — LISINOPRIL 20 MG: 20 TABLET ORAL at 09:43

## 2020-06-24 RX ADMIN — ENOXAPARIN SODIUM 40 MG: 40 INJECTION SUBCUTANEOUS at 09:43

## 2020-06-24 RX ADMIN — ARFORMOTEROL TARTRATE 15 MCG: 15 SOLUTION RESPIRATORY (INHALATION) at 08:49

## 2020-06-24 RX ADMIN — METHYLPREDNISOLONE SODIUM SUCCINATE 30 MG: 40 INJECTION, POWDER, LYOPHILIZED, FOR SOLUTION INTRAMUSCULAR; INTRAVENOUS at 06:00

## 2020-06-24 RX ADMIN — WATER 1 G: 1 INJECTION INTRAMUSCULAR; INTRAVENOUS; SUBCUTANEOUS at 00:21

## 2020-06-24 RX ADMIN — BARIUM SULFATE 10 ML: 400 PASTE ORAL at 12:04

## 2020-06-24 RX ADMIN — SODIUM CHLORIDE SOLN NEBU 3% 4 ML: 3 NEBU SOLN at 17:11

## 2020-06-24 RX ADMIN — ALBUTEROL SULFATE 2.5 MG: 2.5 SOLUTION RESPIRATORY (INHALATION) at 19:38

## 2020-06-24 RX ADMIN — ARFORMOTEROL TARTRATE 15 MCG: 15 SOLUTION RESPIRATORY (INHALATION) at 19:38

## 2020-06-24 RX ADMIN — PANTOPRAZOLE SODIUM 40 MG: 40 TABLET, DELAYED RELEASE ORAL at 06:00

## 2020-06-24 RX ADMIN — BUDESONIDE 500 MCG: 0.5 SUSPENSION RESPIRATORY (INHALATION) at 19:38

## 2020-06-24 RX ADMIN — BUDESONIDE 500 MCG: 0.5 SUSPENSION RESPIRATORY (INHALATION) at 08:49

## 2020-06-24 ASSESSMENT — PAIN SCALES - GENERAL
PAINLEVEL_OUTOF10: 0

## 2020-06-24 NOTE — PROGRESS NOTES
Acute respiratory failure with hypoxia and hypercapnia (HCC)    Acute respiratory failure with hypercapnia (HCC)    Leukocytosis    COPD exacerbation (HCC)    Acute on chronic respiratory failure with hypoxia and hypercapnia (HCC)    Macrocytic anemia    Bacterial pneumonia

## 2020-06-24 NOTE — CARE COORDINATION
6/24/2020  Social Work Discharge Planning:ID is following. Pt is on IV ATB. A referral was made to [facility], however, Ernestina Barba informed that they are unable to follow Pt. JESSICA made a referral to Margie Flanagan with [facility]. [address] Waiting reply. Order will be needed. Pt resides with his family and has aides through [employer] who come 3hrs/day. Pt is on 3l 02 here and on 5l cont. at home through [employer]. Pt has a trilogy through [address] [place] [address] DME. Son Karina Bentley is supposed to bring Pts o2 tank from home when Pt discharges. Electronically signed by Winnie Kayser, LSW on 6/24/2020 at 11:16 AM    6/24/2020  Social Work Discharge Planning:[facility] informed that [address] [place] [address] is unable to follow Pt. Jessica made a referral to Laura with [facility] . Waiting reply. Electronically signed by Winnie Kayser, LSW on 6/24/2020 at 12:20 PM      6/24/2020  Social Work Discharge Planning:JESSICA was informed by Laura with [facility] that they are unable to follow Pt. JESSICA made a referral to [facility] with [facility]. Waiting reply. Electronically signed by Winnie Kayser, LSW on 6/24/2020 at 2:10 PM

## 2020-06-24 NOTE — HOME CARE
Referral received for home health care. If patient should need home infusion has 100% coverage. Kathleen verified. Will follow for plan/orders.    Louisa Lee LPN/DIANN

## 2020-06-24 NOTE — PROGRESS NOTES
the following: seated B ankle pumps AROM x 30: B LAQ's/hamstring curls, hip ABd/ADd with minimal manual resistance x 20    Patient education  Pt was educated on exercise, transfers     Patient response to education:   Pt verbalized understanding Pt demonstrated skill Pt requires further education in this area   yes With instruction yes     ASSESSMENT:   Comments: Nurse ok with Rx. Pt found in bed, agreed to rx, states wants to get OOB to a chair. Pt sat EOB SBA. Gait performed, annabel very slow and laboring. Pt fatigued after brief activity, on 4 L 02 NC, did not appear short of breath, 02 saturation 78% after activity. Pt required approx 2 minutes to recover breath to low 90's. Exercise then performed   Treatment: Pt practiced and was instructed in the following treatment: UE usage to assist with transfers, exercise promoting circulation and strengthening    Pt was left in a bedside chair with call light in reach. Chair/bed alarm: chair alarm active    Time in 1442   Time out 1509   Total Treatment Time 27 minutes   CPT codes:     Therapeutic activities 62472 12 minutes   Therapeutic exercises 20131 15 minutes       Pt is making good progress toward established Physical Therapy goals as per increased functional mobility performed and exercise participation. Continue with physical therapy current plan of care.     Yaakov Wetzel PTA   License Number: [id number]

## 2020-06-25 VITALS
SYSTOLIC BLOOD PRESSURE: 171 MMHG | HEART RATE: 101 BPM | DIASTOLIC BLOOD PRESSURE: 76 MMHG | BODY MASS INDEX: 30.22 KG/M2 | TEMPERATURE: 99.4 F | OXYGEN SATURATION: 100 % | RESPIRATION RATE: 16 BRPM | HEIGHT: 74 IN | WEIGHT: 235.45 LBS

## 2020-06-25 LAB
ANION GAP SERPL CALCULATED.3IONS-SCNC: 5 MMOL/L (ref 7–16)
BASOPHILS ABSOLUTE: 0.01 E9/L (ref 0–0.2)
BASOPHILS RELATIVE PERCENT: 0.1 % (ref 0–2)
BLOOD CULTURE, ROUTINE: NORMAL
BUN BLDV-MCNC: 11 MG/DL (ref 8–23)
CALCIUM SERPL-MCNC: 9 MG/DL (ref 8.6–10.2)
CHLORIDE BLD-SCNC: 89 MMOL/L (ref 98–107)
CO2: 48 MMOL/L (ref 22–29)
CREAT SERPL-MCNC: 0.8 MG/DL (ref 0.7–1.2)
CULTURE, BLOOD 2: NORMAL
EOSINOPHILS ABSOLUTE: 0.03 E9/L (ref 0.05–0.5)
EOSINOPHILS RELATIVE PERCENT: 0.2 % (ref 0–6)
GFR AFRICAN AMERICAN: >60
GFR NON-AFRICAN AMERICAN: >60 ML/MIN/1.73
GLUCOSE BLD-MCNC: 100 MG/DL (ref 74–99)
HCT VFR BLD CALC: 36.2 % (ref 37–54)
HEMOGLOBIN: 10.7 G/DL (ref 12.5–16.5)
HYPOCHROMIA: ABNORMAL
IMMATURE GRANULOCYTES #: 0.09 E9/L
IMMATURE GRANULOCYTES %: 0.5 % (ref 0–5)
LYMPHOCYTES ABSOLUTE: 1.76 E9/L (ref 1.5–4)
LYMPHOCYTES RELATIVE PERCENT: 9.6 % (ref 20–42)
MAGNESIUM: 2.1 MG/DL (ref 1.6–2.6)
MCH RBC QN AUTO: 30.7 PG (ref 26–35)
MCHC RBC AUTO-ENTMCNC: 29.6 % (ref 32–34.5)
MCV RBC AUTO: 103.7 FL (ref 80–99.9)
MONOCYTES ABSOLUTE: 1.9 E9/L (ref 0.1–0.95)
MONOCYTES RELATIVE PERCENT: 10.4 % (ref 2–12)
NEUTROPHILS ABSOLUTE: 14.54 E9/L (ref 1.8–7.3)
NEUTROPHILS RELATIVE PERCENT: 79.2 % (ref 43–80)
PDW BLD-RTO: 12.2 FL (ref 11.5–15)
PHOSPHORUS: 4 MG/DL (ref 2.5–4.5)
PLATELET # BLD: 452 E9/L (ref 130–450)
PMV BLD AUTO: 10.3 FL (ref 7–12)
POIKILOCYTES: ABNORMAL
POLYCHROMASIA: ABNORMAL
POTASSIUM SERPL-SCNC: 3.7 MMOL/L (ref 3.5–5)
RBC # BLD: 3.49 E12/L (ref 3.8–5.8)
SODIUM BLD-SCNC: 142 MMOL/L (ref 132–146)
STOMATOCYTES: ABNORMAL
WBC # BLD: 18.3 E9/L (ref 4.5–11.5)

## 2020-06-25 PROCEDURE — 2580000003 HC RX 258: Performed by: INTERNAL MEDICINE

## 2020-06-25 PROCEDURE — 6360000002 HC RX W HCPCS: Performed by: INTERNAL MEDICINE

## 2020-06-25 PROCEDURE — 84100 ASSAY OF PHOSPHORUS: CPT

## 2020-06-25 PROCEDURE — 80048 BASIC METABOLIC PNL TOTAL CA: CPT

## 2020-06-25 PROCEDURE — 94660 CPAP INITIATION&MGMT: CPT

## 2020-06-25 PROCEDURE — 85025 COMPLETE CBC W/AUTO DIFF WBC: CPT

## 2020-06-25 PROCEDURE — 92526 ORAL FUNCTION THERAPY: CPT | Performed by: SPEECH-LANGUAGE PATHOLOGIST

## 2020-06-25 PROCEDURE — 94640 AIRWAY INHALATION TREATMENT: CPT

## 2020-06-25 PROCEDURE — 6370000000 HC RX 637 (ALT 250 FOR IP): Performed by: INTERNAL MEDICINE

## 2020-06-25 PROCEDURE — 2700000000 HC OXYGEN THERAPY PER DAY

## 2020-06-25 PROCEDURE — 36415 COLL VENOUS BLD VENIPUNCTURE: CPT

## 2020-06-25 PROCEDURE — 83735 ASSAY OF MAGNESIUM: CPT

## 2020-06-25 PROCEDURE — 99239 HOSP IP/OBS DSCHRG MGMT >30: CPT | Performed by: INTERNAL MEDICINE

## 2020-06-25 RX ORDER — PREDNISONE 10 MG/1
TABLET ORAL
Qty: 24 TABLET | Refills: 0 | Status: SHIPPED | OUTPATIENT
Start: 2020-06-25 | End: 2020-12-23

## 2020-06-25 RX ORDER — CEFDINIR 300 MG/1
300 CAPSULE ORAL 2 TIMES DAILY
Qty: 14 CAPSULE | Refills: 0 | Status: SHIPPED | OUTPATIENT
Start: 2020-06-25 | End: 2020-07-02

## 2020-06-25 RX ORDER — ACETAZOLAMIDE 250 MG/1
250 TABLET ORAL 2 TIMES DAILY
Qty: 6 TABLET | Refills: 3 | Status: SHIPPED | OUTPATIENT
Start: 2020-06-25

## 2020-06-25 RX ORDER — FUROSEMIDE 20 MG/1
20 TABLET ORAL DAILY
Qty: 60 TABLET | Refills: 3 | Status: SHIPPED | OUTPATIENT
Start: 2020-06-25 | End: 2020-07-10

## 2020-06-25 RX ADMIN — Medication 10 ML: at 09:37

## 2020-06-25 RX ADMIN — ENOXAPARIN SODIUM 40 MG: 40 INJECTION SUBCUTANEOUS at 09:36

## 2020-06-25 RX ADMIN — SODIUM CHLORIDE SOLN NEBU 3% 4 ML: 3 NEBU SOLN at 08:32

## 2020-06-25 RX ADMIN — WATER 1 G: 1 INJECTION INTRAMUSCULAR; INTRAVENOUS; SUBCUTANEOUS at 01:09

## 2020-06-25 RX ADMIN — PREDNISONE 30 MG: 5 TABLET ORAL at 09:36

## 2020-06-25 RX ADMIN — PANTOPRAZOLE SODIUM 40 MG: 40 TABLET, DELAYED RELEASE ORAL at 06:09

## 2020-06-25 RX ADMIN — BUDESONIDE 500 MCG: 0.5 SUSPENSION RESPIRATORY (INHALATION) at 08:31

## 2020-06-25 RX ADMIN — WATER 1 G: 1 INJECTION INTRAMUSCULAR; INTRAVENOUS; SUBCUTANEOUS at 09:36

## 2020-06-25 RX ADMIN — ALBUTEROL SULFATE 2.5 MG: 2.5 SOLUTION RESPIRATORY (INHALATION) at 08:31

## 2020-06-25 RX ADMIN — HYDROCHLOROTHIAZIDE 25 MG: 25 TABLET ORAL at 09:36

## 2020-06-25 RX ADMIN — ARFORMOTEROL TARTRATE 15 MCG: 15 SOLUTION RESPIRATORY (INHALATION) at 08:33

## 2020-06-25 RX ADMIN — LISINOPRIL 20 MG: 20 TABLET ORAL at 09:36

## 2020-06-25 ASSESSMENT — PAIN SCALES - GENERAL: PAINLEVEL_OUTOF10: 0

## 2020-06-25 NOTE — PLAN OF CARE
Problem: Falls - Risk of:  Goal: Will remain free from falls  Description: Will remain free from falls  6/25/2020 0042 by David Finn RN  Outcome: Met This Shift    Problem: Falls - Risk of:  Goal: Absence of physical injury  Description: Absence of physical injury  6/25/2020 0042 by David Finn RN  Outcome: Met This Shift       Problem: Anxiety/Stress:  Goal: Level of anxiety will decrease  Description: Level of anxiety will decrease  6/25/2020 0042 by David Finn RN  Outcome: Met This Shift

## 2020-06-25 NOTE — PROGRESS NOTES
Reena Crews Hospitalist   Progress Note    Admitting Date and Time: 6/14/2020 10:52 PM  [facility] [J96.90[facility]    Subjective:    Patient was admitted with Respiratory failure ([facility] [address] [J96.90]. Patient denies fever, chills, cp, sob, n/v.     methylPREDNISolone  30 mg Intravenous Q12H    pantoprazole  40 mg Oral QAM AC    lisinopril  20 mg Oral Daily    hydroCHLOROthiazide  25 mg Oral Daily    ceFAZolin  1 g Intravenous Q8H    albuterol  2.5 mg Nebulization 4x daily    sodium chloride (Inhalant)  4 mL Nebulization 4x daily    sodium chloride flush  10 mL Intravenous 2 times per day    enoxaparin  40 mg Subcutaneous Daily    Arformoterol Tartrate  15 mcg Nebulization BID    budesonide  0.5 mg Nebulization BID     hydrALAZINE, 10 mg, Q6H PRN  labetalol, 10 mg, Q6H PRN  sodium chloride flush, 10 mL, PRN  acetaminophen, 650 mg, Q6H PRN    Or  acetaminophen, 650 mg, Q6H PRN  magnesium hydroxide, 30 mL, Daily PRN  promethazine, 12.5 mg, Q6H PRN    Or  ondansetron, 4 mg, Q6H PRN         Objective:    BP (!) 140/72   Pulse 91   Temp 98.2 °F (36.8 °C) (Oral)   Resp 16   Ht 6' 2\" (1.88 m)   Wt 235 lb 7.2 oz (106.8 kg)   SpO2 99%   BMI 30.23 kg/m²   Skin: warm and dry, no rash or erythema  Pulmonary/Chest: clear to auscultation bilaterally- no wheezes, rales or rhonchi, normal air movement, no respiratory distress.  Pt with mild conversational dyspnea  Cardiovascular: rhythm reg at rate of 90  Abdomen: soft, non-tender, non-distended, normal bowel sounds, no masses or organomegaly  Extremities: no cyanosis, no clubbing and no edema      Recent Labs     06/22/20  0520 06/23/20  0728 06/24/20  0330    141 140   K 3.7 4.2 3.4*   CL 93* 90* 86*   CO2 41* 45* 46*   BUN 19 14 12   CREATININE 0.7 0.7 0.7   GLUCOSE 91 103* 109*   CALCIUM 9.0 9.0 8.8       Recent Labs     06/22/20  0520 06/23/20  0728 06/24/20  0330   WBC 19.0* 17.2* 18.0*   RBC 3.13* 3.52* 3.19*   HGB 9.8* 10.8* 9.8*   HCT 31.8* 36.3* 32.6*   .6* 103.1* 102.2*   MCH 31.3 30.7 30.7   MCHC 30.8* 29.8* 30.1*   RDW 12.8 12.5 12.3    409 366   MPV 9.6 9.7 10.4       CBC with Differential:    Lab Results   Component Value Date    WBC 18.0 06/24/2020    RBC 3.19 06/24/2020    HGB 9.8 06/24/2020    HCT 32.6 06/24/2020     06/24/2020    .2 06/24/2020    MCH 30.7 06/24/2020    MCHC 30.1 06/24/2020    RDW 12.3 06/24/2020    SEGSPCT 73 01/31/2014    LYMPHOPCT 7.2 06/24/2020    MONOPCT 10.4 06/24/2020    BASOPCT 0.1 06/24/2020    MONOSABS 1.87 06/24/2020    LYMPHSABS 1.30 06/24/2020    EOSABS 0.02 06/24/2020    BASOSABS 0.02 06/24/2020     BMP:    Lab Results   Component Value Date     06/24/2020    K 3.4 06/24/2020    K 3.9 06/17/2020    CL 86 06/24/2020    CO2 46 06/24/2020    BUN 12 06/24/2020    LABALBU 3.4 06/17/2020    CREATININE 0.7 06/24/2020    CALCIUM 8.8 06/24/2020    GFRAA >60 06/24/2020    LABGLOM >60 06/24/2020    GLUCOSE 109 06/24/2020     Magnesium:    Lab Results   Component Value Date    MG 2.0 06/24/2020     Phosphorus:    Lab Results   Component Value Date    PHOS 2.9 06/24/2020        Radiology:   FL MODIFIED BARIUM SWALLOW W VIDEO   Final Result   Slight pharyngeal delay with thin textures using a straw   otherwise unremarkable modified barium swallow with video. This examination was performed and dictated by Mariela Vazquez PA-C with   indirect supervision and Lance Yost MD reviewed and concurred with   the findings. XR CHEST PORTABLE   Final Result   1. Bibasilar airspace disease compatible with atelectasis or pneumonia   2. Stable positioning of support lines and tubes   3. Questionable small right pleural effusion. This study was dictated by Mariela Vazquez PA-C and Jamie Washington MD    reviewed and concurred with the findings. XR CHEST PORTABLE   Final Result   1.  Persistent bibasilar airspace disease compatible with atelectasis   or pneumonia

## 2020-06-25 NOTE — DISCHARGE SUMMARY
is not retaining CO2 before feeling comfortable to discharge home, patient refused ABGs, wanted to leave AMA, upon discharge his bicarb was at 50. No give the patient 3-day course of Diamox and instructed him to use his trilogy machine and follow-up with his PCP as soon as possible. 5.  Bilateral lower extremity edema, not sure about the etiology but the edema is significant +3-4 up to the thighs, tapering down steroids, will start patient on low-dose Lasix 20 mg specially in the setting of metabolic alkalosis. 6.  Acute metabolic acidosis due to hypercapnia and hypoxia, improved and resolved, patient continues to be hypercapnic. 7.  Deconditioning, appreciate PT and OT input, a.m.-PAC score was 17 out of 24, patient was to leave home. 8.  Patient left AMA despite me explaining the situation and why he needed to get ABGs done before making decision about discharging him home. Discharge Exam:    General Appearance: alert and oriented to person, place and time and in no acute distress  Skin: warm and dry  Head: normocephalic and atraumatic  Eyes: pupils equal, round, and reactive to light, extraocular eye movements intact, conjunctivae normal  Neck: neck supple and non tender without mass   Pulmonary/Chest: Diminished sounds bilaterally- no wheezes, no respiratory distress  Cardiovascular: normal rate, normal S1 and S2 and no carotid bruits  Abdomen: soft, non-tender, non-distended, normal bowel sounds, no masses or organomegaly  Extremities: no cyanosis, no clubbing and +3-4 edema up to the thighs  Neurologic: no cranial nerve deficit and speech normal    I/O last 3 completed shifts: In: 380 [P.O.:380]  Out: 2860 [Urine:2860]  No intake/output data recorded.       LABS:  Recent Labs     06/23/20  0728 06/24/20  0330 06/25/20  0615    140 142   K 4.2 3.4* 3.7   CL 90* 86* 89*   CO2 45* 46* 48*   BUN 14 12 11   CREATININE 0.7 0.7 0.8   GLUCOSE 103* 109* 100*   CALCIUM 9.0 8.8 9.0       Recent Labs renal protocol. 5. Left-sided femoral venous central catheter has its tip in the distal aspect of the left common femoral vein. 6. Additional nonacute findings as described above. THIS REPORT CONTAINS FINDINGS THAT MAY BE CRITICAL TO PATIENT CARE. The findings were verbally communicated via telephone conference with Daisha Georges at 3:46 AM EDT on 6/15/2020. The findings were acknowledged and understood. COMMENTS: Consistent with the [employer]'s Incidental Findings Committee white paper Joy Stinson Am Shirlene Radiol 2018): Any incidental renal lesion less than 1.0 cm or classified as too small to characterize, or any incidental cystic renal lesion characterized as simple-appearing, is likely benign. No follow-up imaging is recommended for these lesions per consensus recommendations based on imaging criteria. This report has been electronically signed by Amalia Clark MD.    Ct Chest Wo Contrast    Addendum Date: 6/15/2020    THIS REPORT CONTAINS FINDINGS THAT MAY BE CRITICAL TO PATIENT CARE. The findings were verbally communicated via telephone conference with Daisha Georges at 3:44 AM EDT on 6/15/2020. The findings were acknowledged and understood. This addendum has been electronically signed by Amalia Clark MD.    Result Date: 6/15/2020  PROCEDURE INFORMATION: Exam: CT Chest Without Contrast Exam date and time: 6/15/2020 12:15 AM Age: 60years old Clinical indication: Dyspnea; Additional info: Pneumonia TECHNIQUE: Imaging protocol: Computed tomography of the chest without contrast. Radiation optimization: All CT scans at this facility use at least one of these dose optimization techniques: automated exposure control; mA and/or kV adjustment per patient size (includes targeted exams where dose is matched to clinical indication); or iterative reconstruction. COMPARISON: DX XR CHEST PORTABLE 6/14/2020 11:52 PM FINDINGS: Tubes, catheters and devices:  An endotracheal tube is noted in good position with tip medication review, etc.    Signed:  Electronically signed by Shanae Galicia MD on 6/25/2020 at 9:57 AM

## 2020-06-25 NOTE — PROGRESS NOTES
Pulmonary [facility] [facility]                             Pulmonary Consult/Progress Note :         CC SOB         HPI    Doing much better  SOB has improved  On 3-4 L home       PHYSICAL EXAMINATION:  Vitals:    06/24/20 2122   BP:    Pulse:    Resp:    Temp:    SpO2: 94%     Constitutional: extubated ,on avaps  EENT: Mallampati class :II  Extraocular muscles intact. External canals are patent and no discharge was appreciated. Septum was midline,   mucosa was without erythema, exudates or cobblestoning. No thrush was noted. Neck: Supple without thyromegaly. No elevated JVP. Trachea was midline. No carotid bruits were auscultated. Respiratory: Rhonchi bilateral    Cardiovascular: Regular without murmur, clicks, gallops or rubs. There is no left or right ventricular heave. Pulses:  Carotid, radial and femoral pulses were equally bilaterally. Abdomen: Slightly rounded and soft without organomegaly. No rebound, rigidity or guarding was appreciated. Lymphatic: No lymphadenopathy. Musculoskeletal: No joint deformity  Extremities: Mild swelling bilaterally +1 edema  Skin:  Warm and dry. Good color, turgor and pigmentation. No lesions or scars.   Neurological/Psychiatric: awake and alert     DATA:   Recent Results (from the past 24 hour(s))   Basic metabolic panel    Collection Time: 06/24/20  3:30 AM   Result Value Ref Range    Sodium 140 132 - 146 mmol/L    Potassium 3.4 (L) 3.5 - 5.0 mmol/L    Chloride 86 (L) 98 - 107 mmol/L    CO2 46 (HH) 22 - 29 mmol/L    Anion Gap 8 7 - 16 mmol/L    Glucose 109 (H) 74 - 99 mg/dL    BUN 12 8 - 23 mg/dL    CREATININE 0.7 0.7 - 1.2 mg/dL    GFR Non-African American >60 >=60 mL/min/1.73    GFR African American >60     Calcium 8.8 8.6 - 10.2 mg/dL   MAGNESIUM    Collection Time: 06/24/20  3:30 AM   Result Value Ref Range    Magnesium 2.0 1.6 - 2.6 mg/dL   Phosphorus    Collection Time: 06/24/20  3:30 AM   Result Value Ref Range Phosphorus 2.9 2.5 - 4.5 mg/dL   CBC WITH AUTO DIFFERENTIAL    Collection Time: 06/24/20  3:30 AM   Result Value Ref Range    WBC 18.0 (H) 4.5 - 11.5 E9/L    RBC 3.19 (L) 3.80 - 5.80 E12/L    Hemoglobin 9.8 (L) 12.5 - 16.5 g/dL    Hematocrit 32.6 (L) 37.0 - 54.0 %    .2 (H) 80.0 - 99.9 fL    MCH 30.7 26.0 - 35.0 pg    MCHC 30.1 (L) 32.0 - 34.5 %    RDW 12.3 11.5 - 15.0 fL    Platelets 267 904 - 840 E9/L    MPV 10.4 7.0 - 12.0 fL    Neutrophils % 81.6 (H) 43.0 - 80.0 %    Immature Granulocytes % 0.6 0.0 - 5.0 %    Lymphocytes % 7.2 (L) 20.0 - 42.0 %    Monocytes % 10.4 2.0 - 12.0 %    Eosinophils % 0.1 0.0 - 6.0 %    Basophils % 0.1 0.0 - 2.0 %    Neutrophils Absolute 14.71 (H) 1.80 - 7.30 E9/L    Immature Granulocytes # 0.10 E9/L    Lymphocytes Absolute 1.30 (L) 1.50 - 4.00 E9/L    Monocytes Absolute 1.87 (H) 0.10 - 0.95 E9/L    Eosinophils Absolute 0.02 (L) 0.05 - 0.50 E9/L    Basophils Absolute 0.02 0.00 - 0.20 E9/L    Polychromasia 1+     Hypochromia 1+     Poikilocytes 1+     Ovalocytes 1+     Stomatocytes 1+     Basophilic Stippling 1+        FVC 1.37 which is 50% of predicted   FEV1 0.62 which is 17% of predicted    FEV1/FVC 45 which is 57 of predicted with lower limit of normal 68     MVV 22 which is 14% of predicted   impression very severe obstruction  DLCO 38 as. Last PFT which is booked him a severe reduction in DLCO    1 antitrypsin 167 with phenotype MM     Ref.  Range 6/16/2020 16:10 6/16/2020 16:10 6/16/2020 16:10   Organism Unknown Staphylococcus aureus (A) Proteus mirabilis (A) Klebsiella pneumoniae ssp pneumoniae (A)     IMPRESSION:      Acute Hypercapnic resp failure   Acute COPD exacerbation   1-very severe COPD  2-obstructive sleep apnea  3-obesity  4--chronic hypoxic respiratory failure  5-Nicotine dependence    PLAN:    Wean O2   On AVAPS at night and as needed    *- Grow multi bacteria as above,on Ancef ,change Omnicef 300 mg PO X  7 days more   *-Prednisone 30mg PO daily 4 days and

## 2020-07-06 RX ORDER — LISINOPRIL AND HYDROCHLOROTHIAZIDE 25; 20 MG/1; MG/1
1 TABLET ORAL DAILY
Qty: 30 TABLET | Refills: 5 | Status: SHIPPED
Start: 2020-07-06 | End: 2020-12-28 | Stop reason: SDUPTHER

## 2020-07-10 ENCOUNTER — VIRTUAL VISIT (OUTPATIENT)
Dept: PRIMARY CARE CLINIC | Age: 60
End: 2020-07-10
Payer: MEDICAID

## 2020-07-10 PROBLEM — D53.9 MACROCYTIC ANEMIA: Status: RESOLVED | Noted: 2020-06-15 | Resolved: 2020-07-10

## 2020-07-10 PROBLEM — J96.01 ACUTE RESPIRATORY FAILURE WITH HYPOXIA AND HYPERCAPNIA (HCC): Status: RESOLVED | Noted: 2020-02-12 | Resolved: 2020-07-10

## 2020-07-10 PROBLEM — J96.02 ACUTE RESPIRATORY FAILURE WITH HYPOXIA AND HYPERCAPNIA (HCC): Status: RESOLVED | Noted: 2020-02-12 | Resolved: 2020-07-10

## 2020-07-10 PROCEDURE — 99214 OFFICE O/P EST MOD 30 MIN: CPT | Performed by: FAMILY MEDICINE

## 2020-07-10 RX ORDER — FUROSEMIDE 20 MG/1
20 TABLET ORAL DAILY
Qty: 30 TABLET | Refills: 0 | Status: SHIPPED | OUTPATIENT
Start: 2020-07-10

## 2020-07-10 ASSESSMENT — ENCOUNTER SYMPTOMS
BLOOD IN STOOL: 0
DIARRHEA: 0
NAUSEA: 0
DIFFICULTY BREATHING: 1
WHEEZING: 0
SPUTUM PRODUCTION: 1
BACK PAIN: 0
ABDOMINAL PAIN: 0
VOMITING: 0
SORE THROAT: 0
SINUS PRESSURE: 0
RHINORRHEA: 0
COUGH: 1
APNEA: 0
CONSTIPATION: 0
TROUBLE SWALLOWING: 0
EYE REDNESS: 0
CHEST TIGHTNESS: 0
EYE PAIN: 0
COLOR CHANGE: 0
SHORTNESS OF BREATH: 1
EYE ITCHING: 0

## 2020-07-10 ASSESSMENT — PATIENT HEALTH QUESTIONNAIRE - PHQ9
SUM OF ALL RESPONSES TO PHQ QUESTIONS 1-9: 0
SUM OF ALL RESPONSES TO PHQ9 QUESTIONS 1 & 2: 0
1. LITTLE INTEREST OR PLEASURE IN DOING THINGS: 0
SUM OF ALL RESPONSES TO PHQ QUESTIONS 1-9: 0
2. FEELING DOWN, DEPRESSED OR HOPELESS: 0

## 2020-07-10 ASSESSMENT — COPD QUESTIONNAIRES: COPD: 1

## 2020-07-10 NOTE — PROGRESS NOTES
TELEPHONE VISIT    Consent:  He and/or health care decision maker is aware that that he may receive a bill for this telephone service, depending on his insurance coverage, and has provided verbal consent to proceed: Yes      Documentation:  I communicated with the patient and/or health care decision maker about COPD, Pneumonia. Details of this discussion including any medical advice provided: See note below      I affirm this is a Patient Initiated Episode with a Patient who has not had a related appointment within my department in the past 7 days or scheduled within the next 24 hours. Patient's location: home address in [facility]   Physician  location other address in [facility]   Other people involved in call n/a          Total Time: minutes: 21-30 minutes        Chief Complaint:     Chief Complaint   Patient presents with    COPD     went into hospital on 6/14/20 for COPD and pneumonia    Pneumonia         COPD   He complains of cough, difficulty breathing, shortness of breath and sputum production. There is no wheezing. This is a chronic problem. The current episode started more than 1 year ago. The problem occurs intermittently. The problem has been waxing and waning. The cough is productive of sputum. Associated symptoms include dyspnea on exertion, malaise/fatigue and myalgias. Pertinent negatives include no appetite change, chest pain, ear pain, fever, headaches, postnasal drip, rhinorrhea, sore throat or trouble swallowing. His symptoms are aggravated by URI. His symptoms are alleviated by beta-agonist, steroid inhaler and oral steroids. He reports moderate improvement on treatment. Risk factors for lung disease include smoking/tobacco exposure. His past medical history is significant for COPD and pneumonia. Pneumonia   He complains of cough, difficulty breathing, shortness of breath and sputum production. There is no wheezing. This is a new problem. The current episode started 1 to 4 weeks ago.  The problem occurs daily. The problem has been gradually improving. The cough is productive of sputum. Associated symptoms include dyspnea on exertion, malaise/fatigue and myalgias. Pertinent negatives include no appetite change, chest pain, ear pain, fever, headaches, postnasal drip, rhinorrhea, sore throat or trouble swallowing. His symptoms are aggravated by URI. His symptoms are alleviated by beta-agonist, steroid inhaler and oral steroids. He reports moderate improvement on treatment. Risk factors for lung disease include smoking/tobacco exposure. His past medical history is significant for COPD and pneumonia. Fatigue   This is a new problem. The current episode started 1 to 4 weeks ago. The problem occurs daily. The problem has been unchanged. Associated symptoms include arthralgias, coughing, fatigue, myalgias and weakness. Pertinent negatives include no abdominal pain, chest pain, congestion, fever, headaches, nausea, neck pain, numbness, rash, sore throat or vomiting. Nothing aggravates the symptoms. He has tried nothing for the symptoms. The treatment provided no relief. Discharge Diagnoses: Principal Problem:    Acute on chronic respiratory failure with hypoxia and hypercapnia (HCC)  Active Problems:    Hypertension    COPD exacerbation (HCC)    Macrocytic anemia    Bacterial pneumonia    Patient Active Problem List   Diagnosis    Hypertension    COPD (chronic obstructive pulmonary disease) (HCC)    Chronic fatigue    Myalgia    Acute on chronic respiratory failure with hypoxia and hypercapnia (HCC)    Bacterial pneumonia       Past Medical History:   Diagnosis Date    COPD (chronic obstructive pulmonary disease) ([facility]     Hypertension        History reviewed. No pertinent surgical history.     Current Outpatient Medications   Medication Sig Dispense Refill    furosemide (LASIX) 20 MG tablet Take 1 tablet by mouth daily 30 tablet 0    lisinopril-hydroCHLOROthiazide (PRINZIDE;ZESTORETIC) 20-25 MG per tablet TAKE 1 TABLET BY MOUTH DAILY 30 tablet 5    predniSONE (DELTASONE) 10 MG tablet 3 tabs x 4 days, then 2 tabs x 4 days, then 1 tabs x 4 days 24 tablet 0    budesonide-formoterol (SYMBICORT) 160-4.5 MCG/ACT AERO Inhale 2 puffs into the lungs 2 times daily 1 Inhaler 5    tiotropium (SPIRIVA HANDIHALER) 18 MCG inhalation capsule Inhale 1 capsule into the lungs daily 30 capsule 5    albuterol sulfate HFA (VENTOLIN HFA) 108 (90 Base) MCG/ACT inhaler INHALE 2 PUFFS BY MOUTH INTO THE LUNGS EVERY 4 HOURS AS NEEDED FOR WHEEZING 18 g 6    ipratropium-albuterol (DUONEB) 0.5-2.5 (3) MG/3ML SOLN nebulizer solution       acetaZOLAMIDE (DIAMOX) 250 MG tablet Take 1 tablet by mouth 2 times daily 6 tablet 3     No current facility-administered medications for this visit.         Allergies   Allergen Reactions    Iv Dye [Iodides] Hives       Social History     Socioeconomic History    Marital status: Single     Spouse name: None    Number of children: None    Years of education: None    Highest education level: None   Occupational History    None   Social Needs    Financial resource strain: None    Food insecurity     Worry: None     Inability: None    Transportation needs     Medical: None     Non-medical: None   Tobacco Use    Smoking status: Current Some Day Smoker     Packs/day: 0.50     Years: 40.00     Pack years: 20.00     Last attempt to quit: 2016     Years since quittin.1    Smokeless tobacco: Never Used   Substance and Sexual Activity    Alcohol use: No     Comment: social    Drug use: Yes     Types: Marijuana    Sexual activity: None   Lifestyle    Physical activity     Days per week: None     Minutes per session: None    Stress: None   Relationships    Social connections     Talks on phone: None     Gets together: None     Attends Yazdanism service: None     Active member of club or organization: None     Attends meetings of clubs or organizations: None     Relationship status: None    Intimate partner violence     Fear of current or ex partner: None     Emotionally abused: None     Physically abused: None     Forced sexual activity: None   Other Topics Concern    None   Social History Narrative    None       History reviewed. No pertinent family history. Review of Systems   Constitutional: Positive for fatigue and malaise/fatigue. Negative for activity change, appetite change and fever. HENT: Negative for congestion, ear pain, hearing loss, nosebleeds, postnasal drip, rhinorrhea, sinus pressure, sore throat and trouble swallowing. Eyes: Negative for pain, redness, itching and visual disturbance. Respiratory: Positive for cough, sputum production and shortness of breath. Negative for apnea, chest tightness and wheezing. Cardiovascular: Positive for dyspnea on exertion. Negative for chest pain, palpitations and leg swelling. Gastrointestinal: Negative for abdominal pain, blood in stool, constipation, diarrhea, nausea and vomiting. Endocrine: Negative. Genitourinary: Negative for decreased urine volume, difficulty urinating, dysuria, frequency, hematuria and urgency. Musculoskeletal: Positive for arthralgias and myalgias. Negative for back pain, gait problem and neck pain. Skin: Negative for color change and rash. Allergic/Immunologic: Negative for environmental allergies and food allergies. Neurological: Positive for weakness. Negative for dizziness, light-headedness, numbness and headaches. Hematological: Negative for adenopathy. Does not bruise/bleed easily. Psychiatric/Behavioral: Negative for behavioral problems, dysphoric mood and sleep disturbance. The patient is not nervous/anxious and is not hyperactive. All other systems reviewed and are negative. Patient-Reported Vitals 7/10/2020   Patient-Reported Weight 237lb   Patient-Reported Height 6 1        There were no vitals taken for this visit.     Physical Exam  Vitals signs and nursing note reviewed. Constitutional:       General: He is not in acute distress. Pulmonary:      Effort: Pulmonary effort is normal.   Neurological:      Mental Status: He is alert and oriented to person, place, and time. Psychiatric:         Attention and Perception: Attention normal.         Mood and Affect: Mood and affect normal.                                 ASSESSMENT/PLAN:    Patient Active Problem List   Diagnosis    Hypertension    [facility]    Chronic fatigue    Myalgia    Acute on chronic respiratory failure with hypoxia and hypercapnia (HCC)    Bacterial pneumonia       Shlomo was seen today for copd and pneumonia. Diagnoses and all orders for this visit:    Essential hypertension    Chronic obstructive pulmonary disease, unspecified COPD type ([facility] [address]  -     XR CHEST STANDARD (2 VW); Future    Acute on chronic respiratory failure with hypoxia and hypercapnia (HCC)  -     CBC Auto Differential; Future  -     Comprehensive Metabolic Panel; Future  -     XR CHEST STANDARD (2 VW); Future    Bacterial pneumonia  -     CBC Auto Differential; Future  -     Comprehensive Metabolic Panel; Future  -     XR CHEST STANDARD (2 VW); Future    Chronic fatigue    Myalgia    Other orders  -     furosemide (LASIX) 20 MG tablet; Take 1 tablet by mouth daily    Recommend Pulmonary Rehab. He states he will discuss with Dr. Manny Lopez in about 3 months (around 10/10/2020) for Follow up COPD, Follow up HTN, Recheck labs, Recheck Meds. I spent 30 minutes with this patient. I spent greater than 50% of the time counseling this patient.         Hugh Kohler DO  7/10/2020  8:35 AM

## 2020-07-15 ENCOUNTER — HOSPITAL ENCOUNTER (OUTPATIENT)
Age: 60
Discharge: HOME | End: 2020-07-17
Payer: MEDICAID

## 2020-07-15 ENCOUNTER — HOSPITAL ENCOUNTER (OUTPATIENT)
Dept: GENERAL RADIOLOGY | Age: 60
Discharge: HOME | End: 2020-07-17
Payer: MEDICAID

## 2020-07-15 PROCEDURE — 71046 X-RAY EXAM CHEST 2 VIEWS: CPT

## 2020-07-16 ENCOUNTER — TELEPHONE (OUTPATIENT)
Dept: PRIMARY CARE CLINIC | Age: 60
End: 2020-07-16

## 2020-07-16 RX ORDER — DOXYCYCLINE HYCLATE 100 MG
100 TABLET ORAL 2 TIMES DAILY
Qty: 20 TABLET | Refills: 0 | Status: SHIPPED | OUTPATIENT
Start: 2020-07-16 | End: 2020-07-26

## 2020-07-16 NOTE — TELEPHONE ENCOUNTER
The pt is calling to see if something is being sent over to the pharmacy because of the xray still showing he has some pneumonia in his lungs

## 2020-07-23 ENCOUNTER — TELEPHONE (OUTPATIENT)
Dept: PRIMARY CARE CLINIC | Age: 60
End: 2020-07-23

## 2020-07-23 RX ORDER — TAMSULOSIN HYDROCHLORIDE 0.4 MG/1
0.4 CAPSULE ORAL DAILY
Qty: 90 CAPSULE | Refills: 1 | Status: SHIPPED | OUTPATIENT
Start: 2020-07-23

## 2020-07-23 NOTE — TELEPHONE ENCOUNTER
Pt said when he called about the antibiotic he forgot to mention about the urine problems, asking for medication to help.

## 2020-07-29 ENCOUNTER — TELEPHONE (OUTPATIENT)
Dept: PULMONOLOGY | Age: 60
End: 2020-07-29

## 2020-07-29 NOTE — LETTER
[facility] and [address] [place] [address]  Phone: [phone]  Fax: [phone]    Alaina Dobbins RN        July 29, 2020     Patient: Michael Carlton   YOB: 1960   Date of Visit: 7/29/2020       To Whom It May Concern: This letter is being provided to support the medical necessity of an air conditioning unit for Michael Carlton. Francisco Hougholph is under the care of Dr. Alex Townsend MD in the Pulmonary Health and [address] [place] [address] ([facility]). He has a chronic medical condition and it is recommended that he maintain a dry and cool living environment at home to prevent an exacerbation of his condition. Should you have an questions, please fee free to contact our office at [phone].      Sincerely,        Alaina Dobbins RN

## 2020-07-29 NOTE — TELEPHONE ENCOUNTER
Pt calling into office to request letter to allow him to qualify for air conditioning unit.  Shlomo jonas RN will contact fax # for [facility] program. Advised pt that Rn will follow up with faxing letter and will mail it out  original.

## 2020-07-29 NOTE — LETTER
[facility] and [address] [place] [address]  Phone: [phone]  Fax: [phone]    Mehul Vidal RN        July 29, 2020     Patient: Germania Mills   YOB: 1960   Date of Visit: 7/29/2020       To Whom It May Concern: This letter is being provided in support of Mr. Germania Mills and his medical need for an air conditioning unit. Saroj Enrique is currently under the care of Dr. Karina Clay MD for his chronic respiratory condition. This medical condition requires that he live in a dry, cool living environment to help reduce exacerbations of his condition. If you have any questions or concerns, please don't hesitate to call the Pulmonary [facility] and [address] [place] [address] for any additional info needed.      Sincerely,        Mehul Vidal RN

## 2020-08-13 ENCOUNTER — VIRTUAL VISIT (OUTPATIENT)
Dept: PULMONOLOGY | Age: 60
End: 2020-08-13
Payer: MEDICAID

## 2020-08-13 PROCEDURE — 99214 OFFICE O/P EST MOD 30 MIN: CPT | Performed by: INTERNAL MEDICINE

## 2020-08-13 RX ORDER — VARENICLINE TARTRATE 1 MG/1
TABLET, FILM COATED ORAL
COMMUNITY
Start: 2020-08-03 | End: 2020-10-07 | Stop reason: SDUPTHER

## 2020-08-13 NOTE — PROGRESS NOTES
coughing weight loss ,hemoptysis ,dyspnea on exertion,pleuritic chest pain, fever or chills or orthopnea. He has a dog living with him at home    He works as  for 15 years    As mentioned he started smoking at age 14 with him about 40 pack -year smoking history      He has been on  oxygen at home 3-5 L in the daytime, use  at night as well    Today visit    Since stated that he is breathing very well he stated that Tilman Shows is not helping him and he went back to Symbicort, and he also using Spiriva as well.     He also continue to use his home oxygen    He did not do the CAT scan as of the chest as he was worried about if he finds something he will not be able to afford the workup on further investigation      He denies any fever or chest pain hemoptysis his breathing at baseline no cough no wheezing no swelling in legs      He was able to quit smoking with the help of Chantix,He  is no longer a smoker    Today visit   Still with SOB and MANNING   He was admitted to ICU and was treated for pneumonia and then again by PCP for pneimonia  Still on 3 -4 L  He still smoke   He denies any fever or chills and no chest pain   Doing fair with  Symbicort, Spiriva   No chest pain   On O2       ALLERGIES:    Allergies   Allergen Reactions    Iv Dye [Iodides] Hives       PAST MEDICAL HISTORY:       Diagnosis Date    COPD (chronic obstructive pulmonary disease) ([facility] [address] [facility]     Hypertension        MEDICATIONS:   Current Outpatient Medications   Medication Sig Dispense Refill    tamsulosin (FLOMAX) 0.4 MG capsule Take 1 capsule by mouth daily 90 capsule 1    furosemide (LASIX) 20 MG tablet Take 1 tablet by mouth daily 30 tablet 0    lisinopril-hydroCHLOROthiazide (PRINZIDE;ZESTORETIC) 20-25 MG per tablet TAKE 1 TABLET BY MOUTH DAILY 30 tablet 5    predniSONE (DELTASONE) 10 MG tablet 3 tabs x 4 days, then 2 tabs x 4 days, then 1 tabs x 4 days 24 tablet 0    acetaZOLAMIDE (DIAMOX) 250 MG tablet Take 1 tablet by mouth 2 times daily 6 tablet 3    budesonide-formoterol (SYMBICORT) 160-4.5 MCG/ACT AERO Inhale 2 puffs into the lungs 2 times daily 1 Inhaler 5    tiotropium (SPIRIVA HANDIHALER) 18 MCG inhalation capsule Inhale 1 capsule into the lungs daily 30 capsule 5    albuterol sulfate HFA (VENTOLIN HFA) 108 (90 Base) MCG/ACT inhaler INHALE 2 PUFFS BY MOUTH INTO THE LUNGS EVERY 4 HOURS AS NEEDED FOR WHEEZING 18 g 6    ipratropium-albuterol (DUONEB) 0.5-2.5 (3) MG/3ML SOLN nebulizer solution        No current facility-administered medications for this visit. SOCIAL AND OCCUPATIONAL HEALTH:  Social History     Tobacco Use   Smoking Status Current Some Day Smoker    Packs/day: 0.50    Years: 40.00    Pack years: 20.00    Last attempt to quit: 2016    Years since quittin.2   Smokeless Tobacco Never Used     TB :no  Pets dog  Industrial exposure:minimal  Birds :none    SURGICAL HISTORY:   No past surgical history on file. FAMILY HISTORY:   Lung cancer:none  DVT or PE None     REVIEW OF SYSTEMS:  Constitutional: General health is good . There has been no weight changes. Mild fatigue no   Head: Patient denies any history of trauma, convulsive disorder or syncope. Skin:  Patient denies history of changes in pigmentation, eruptions or pruritus. No easy bruising or bleeding. EENT: no nasal congestion   Cardiovascular ,No chest pain ,No edema ,  Respiration:SOB yes  :  ,MANNING :yes ++  Gastrointestinal:No GI bleed ,no melena  ,no hematemesis    Musculoskeletal: no joint pain ,no swelling  Neurological:no , syncope. Denies twitching, convulsions, loss of consciousness or memory. Endocrine:  . No history of goiter, exophthalmos or dryness of skin. The patient has no history of diabetes. Hematopoietic:  No history of bleeding disorders or easy bruising. Rheumatic:  No connective tissue disease history or polyarthritis/inflammatory joint disease.       PHYSICAL EXAMINATION:  There were no vitals filed for this visit.  This is a phone     DATA:     FVC 1.37 which is 50% of predicted   FEV1 0.62 which is 17% of predicted    FEV1/FVC 45 which is 57 of predicted with lower limit of normal 68     MVV 22 which is 14% of predicted   impression very severe obstruction  DLCO 38 as. Last PFT which is booked him a severe reduction in DLCO    1 antitrypsin 167 with phenotype MM    IMPRESSION:    1-very severe COPD  2-obstructive sleep apnea  3-obesity  4--chronic hypoxic respiratory failure  5-Nicotine dependence    PLAN:      -Patient is very pleasant 58-year-old male with history of severe COPD with no response to bronchodilator with severe reduction in DLCO and history of nicotine dependence  Still smoking   Trilogy helping   Use Symbicort and Spiriva   Use albuterol 1-2 a day   Darlisep 250 mg daily   Referral    Pulmonary Rehab now AP   Alph 1 antitrypsin normal   IgE  38  Will order CT chest r/o ,mass ,need to go     He will benefit from O2 use at 3-4 L/min  CXR     Thank you for allowing me to participate in [facility].  I will keep following with you ,should you have any concerns ,please contact me at [phone]       Will get CT next 3 months     Sincerely,        Alberto Garcia MD  Pulmonary & Critical Care Medicine

## 2020-08-31 ENCOUNTER — TELEPHONE (OUTPATIENT)
Dept: PULMONOLOGY | Age: 60
End: 2020-08-31

## 2020-08-31 NOTE — TELEPHONE ENCOUNTER
Call from pt requesting lightweight portable oxygen for home and travel use. Pt reports he has difficulty carrying small cylinders and would like to see if he can use the small light weight concentrators. Medhat Demarco just requires orders to be faxed over to all pt to be assessed for device that would be easier for pt to use. Pt advised that office will assist in processing thru Medhat Demarco. Pt to call if further needs.

## 2020-09-01 ENCOUNTER — TELEPHONE (OUTPATIENT)
Dept: PRIMARY CARE CLINIC | Age: 60
End: 2020-09-01

## 2020-10-02 RX ORDER — ALBUTEROL SULFATE 90 UG/1
AEROSOL, METERED RESPIRATORY (INHALATION)
Qty: 18 G | Refills: 6 | Status: SHIPPED
Start: 2020-10-02 | End: 2020-12-23 | Stop reason: SDUPTHER

## 2020-10-07 RX ORDER — VARENICLINE TARTRATE 1 MG/1
TABLET, FILM COATED ORAL
Qty: 60 TABLET | Refills: 2 | Status: SHIPPED | OUTPATIENT
Start: 2020-10-07

## 2020-10-08 ENCOUNTER — TELEPHONE (OUTPATIENT)
Dept: PRIMARY CARE CLINIC | Age: 60
End: 2020-10-08

## 2020-11-10 ENCOUNTER — TELEPHONE (OUTPATIENT)
Dept: PULMONOLOGY | Age: 60
End: 2020-11-10

## 2020-11-10 NOTE — TELEPHONE ENCOUNTER
Mailed a letter to patient informing him that his CT Chest is scheduled for 12-2-20 at 11:00 am at the [facility]. He must arrive by 10:30 am. There is no prep for this test

## 2020-11-11 RX ORDER — BUDESONIDE AND FORMOTEROL FUMARATE DIHYDRATE 160; 4.5 UG/1; UG/1
2 AEROSOL RESPIRATORY (INHALATION) 2 TIMES DAILY
Qty: 1 INHALER | Refills: 5 | Status: SHIPPED | OUTPATIENT
Start: 2020-11-11

## 2020-11-11 RX ORDER — TIOTROPIUM BROMIDE 18 UG/1
18 CAPSULE ORAL; RESPIRATORY (INHALATION) DAILY
Qty: 30 CAPSULE | Refills: 5 | Status: SHIPPED | OUTPATIENT
Start: 2020-11-11

## 2020-11-11 NOTE — PROGRESS NOTES
Call from pt requesting refill on Spiriva HandiHaler; pt reports [facility] has been requesting refill for pt. Advised pt that Pharmacy has not contacted office however I have sent refill over to [facility] per Dr. Stephanie Jacobo's orders.

## 2020-12-23 ENCOUNTER — TELEPHONE (OUTPATIENT)
Dept: PULMONOLOGY | Age: 60
End: 2020-12-23

## 2020-12-23 RX ORDER — PREDNISONE 10 MG/1
TABLET ORAL
Qty: 30 TABLET | Refills: 0 | Status: SHIPPED | OUTPATIENT
Start: 2020-12-23 | End: 2021-01-04

## 2020-12-23 RX ORDER — ALBUTEROL SULFATE 90 UG/1
AEROSOL, METERED RESPIRATORY (INHALATION)
Qty: 18 G | Refills: 6 | Status: SHIPPED | OUTPATIENT
Start: 2020-12-23

## 2020-12-23 NOTE — TELEPHONE ENCOUNTER
Call from pt asking for samples of Symbicort. Discussed with pt his symptoms of tightness/shortness of breath increasing over past few days. Taking \"extra\" doses of Symbicort inhaler to help(pt reports it doesn't seem to be lasting). Reviewed meds being used and discussed steroid taper to help with this exacerbation. Pt using O2 continuous along with Trilogy device in home(NIV). Reviewed use of DuoNeb aerosols or Albuterol rescue inhaler if feeling shortness of breath. Pt verbalized understanding and will begin steroid taper per orders. Encouraged pt to call if symptoms do not improve. Will seek out emergency treatment as needed. Denies fever or any congestion at this time. Pt will call prn.

## 2020-12-28 RX ORDER — LISINOPRIL AND HYDROCHLOROTHIAZIDE 25; 20 MG/1; MG/1
1 TABLET ORAL DAILY
Qty: 30 TABLET | Refills: 5 | Status: SHIPPED | OUTPATIENT
Start: 2020-12-28

## 2021-01-13 ENCOUNTER — TELEPHONE (OUTPATIENT)
Dept: PRIMARY CARE CLINIC | Age: 61
End: 2021-01-13

## 2021-01-13 NOTE — TELEPHONE ENCOUNTER
[facility] got notified that the pt  on . They were calling to see if you were the one that signed the death certificate and what the cause of death was. I told her that we had not been notified of pt dying and you had not signed the death certificate, but that I would let you know that the pt had .

## 2021-02-02 ENCOUNTER — TELEPHONE (OUTPATIENT)
Dept: PRIMARY CARE CLINIC | Age: 61
End: 2021-02-02

## 2023-08-31 NOTE — PATIENT CARE CONFERENCE
Intensive Care Daily Quality Rounding Checklist        ICU Team Members: bedside nurse, charge nurse, , RT,     ICU Day #: NUMBER: 8     Intubation Date: Extubated 6/20     Ventilator Day #: NA     Central Line Insertion Date: NA                             Day #: NA      Arterial Line Insertion Date:  N/A                             Day #: N/A     DVT Prophylaxis: Lovenox    GI Prophylaxis: Protonix     Parra Catheter Insertion DIGR: JWRQ 63, 2020                             Day #: 8                             Continued need (if yes, reason documented and discussed with physician): yes, accurate intake and output in a critically ill patient.     Skin Issues/ Wounds and ordered treatment discussed on rounds: no issues, SOS precautions     Goals/ Plans for the Day: Monitor labs, xrays, vitals, continue to wean AVAPS settings General